# Patient Record
Sex: FEMALE | Race: WHITE | NOT HISPANIC OR LATINO | Employment: OTHER | URBAN - METROPOLITAN AREA
[De-identification: names, ages, dates, MRNs, and addresses within clinical notes are randomized per-mention and may not be internally consistent; named-entity substitution may affect disease eponyms.]

---

## 2019-01-02 ENCOUNTER — OFFICE VISIT (OUTPATIENT)
Dept: OBGYN CLINIC | Facility: MEDICAL CENTER | Age: 69
End: 2019-01-02
Payer: COMMERCIAL

## 2019-01-02 VITALS
WEIGHT: 179 LBS | HEIGHT: 67 IN | DIASTOLIC BLOOD PRESSURE: 80 MMHG | SYSTOLIC BLOOD PRESSURE: 128 MMHG | BODY MASS INDEX: 28.09 KG/M2

## 2019-01-02 DIAGNOSIS — Z12.31 ENCOUNTER FOR SCREENING MAMMOGRAM FOR MALIGNANT NEOPLASM OF BREAST: ICD-10-CM

## 2019-01-02 DIAGNOSIS — Z01.419 ENCOUNTER FOR GYNECOLOGICAL EXAMINATION (GENERAL) (ROUTINE) WITHOUT ABNORMAL FINDINGS: ICD-10-CM

## 2019-01-02 DIAGNOSIS — Z78.0 MENOPAUSE: Primary | ICD-10-CM

## 2019-01-02 PROCEDURE — G0145 SCR C/V CYTO,THINLAYER,RESCR: HCPCS | Performed by: OBSTETRICS & GYNECOLOGY

## 2019-01-02 PROCEDURE — 99397 PER PM REEVAL EST PAT 65+ YR: CPT | Performed by: OBSTETRICS & GYNECOLOGY

## 2019-01-02 RX ORDER — OMEGA-3-ACID ETHYL ESTERS 1 G/1
2 CAPSULE, LIQUID FILLED ORAL 2 TIMES DAILY
COMMUNITY
End: 2020-01-10

## 2019-01-02 RX ORDER — ERGOCALCIFEROL (VITAMIN D2) 10 MCG
800 TABLET ORAL
COMMUNITY
Start: 2016-02-22 | End: 2020-01-10

## 2019-01-02 NOTE — PROGRESS NOTES
Assessment/Plan: This 71-year-old  2 para 2 patient is seen for a yearly gyn evaluation  She had her main complaint is she has a cystocele and uterine prolapse  No problem-specific Assessment & Plan notes found for this encounter  Subjective:      Patient ID: Дмитрий Yu is a 70 y o  female  This 71-year-old patient has had no vaginal bleeding over the past year or episodes of vaginitis  [place] is not occurring at this time  She has been bothered by a progressive a dropping of her bladder and uterus over the years  Her urine function is good she has no urine stress incontinence and does not wear liners or pads every day  She has had no frequent urinary infections over the year  Her bowel function is normal except if she eats wheat  With wheat  she gets diarrhea  She has no chronic headaches or fainting spells  She has no hot flashes  She does have difficulty sleeping at night but does not wish to have any medication for this at this time  Review of Systems   Constitutional: Negative  HENT: Negative  Eyes: Negative  Respiratory: Negative  Cardiovascular: Negative  Gastrointestinal: Negative  Endocrine: Negative  Genitourinary: Negative  Musculoskeletal: Negative  Skin: Negative  Allergic/Immunologic: Negative  Neurological: Negative  Hematological: Negative  Psychiatric/Behavioral: Negative  Objective:      /80 (BP Location: Left arm, Patient Position: Sitting, Cuff Size: Large)   Ht 5' 6 75" (1 695 m)   Wt 81 2 kg (179 lb)   BMI 28 25 kg/m²          Physical Exam   Constitutional: She is oriented to person, place, and time  She appears well-developed and well-nourished  HENT:   Head: Normocephalic  Neck: Normal range of motion  Neck supple  Cardiovascular: Normal rate, regular rhythm, normal heart sounds and intact distal pulses      Pulmonary/Chest: Effort normal and breath sounds normal    Abdominal: Soft  Bowel sounds are normal    Genitourinary: Uterus normal    Musculoskeletal: Normal range of motion  Neurological: She is alert and oriented to person, place, and time  Skin: Skin is warm and dry  Psychiatric: She has a normal mood and affect  Nursing note and vitals reviewed  breast exam is normal  Pelvic exam reveals uterus to be anterior mobile normal size  Uterus prolapses to 1 inch inside the introitus  The bladder pro prolapses to the introitus  There is no blood or discharge in the vagina  The vulva is normal  Rectal exam shows no blood or  masses in the rectum and no nodularity in the cul-de-sac

## 2019-01-02 NOTE — PATIENT INSTRUCTIONS
The patient was told that her breast exam is normal and that a pelvic exam confirms the presence of a cystocele and uterine prolapse  I did advise that she see a urogynecologist for evaluation of her cystocele

## 2019-01-07 LAB
LAB AP GYN PRIMARY INTERPRETATION: NORMAL
Lab: NORMAL

## 2019-04-25 ENCOUNTER — HOSPITAL ENCOUNTER (OUTPATIENT)
Dept: MAMMOGRAPHY | Facility: CLINIC | Age: 69
Discharge: HOME | End: 2019-04-25
Payer: COMMERCIAL

## 2019-04-25 DIAGNOSIS — Z78.0 MENOPAUSE: ICD-10-CM

## 2019-04-25 PROCEDURE — 77080 DXA BONE DENSITY AXIAL: CPT

## 2019-04-26 ENCOUNTER — TELEPHONE (OUTPATIENT)
Dept: OBGYN CLINIC | Facility: CLINIC | Age: 69
End: 2019-04-26

## 2020-01-10 ENCOUNTER — OFFICE VISIT (OUTPATIENT)
Dept: FAMILY MEDICINE CLINIC | Facility: CLINIC | Age: 70
End: 2020-01-10
Payer: COMMERCIAL

## 2020-01-10 VITALS
TEMPERATURE: 97.3 F | WEIGHT: 177.4 LBS | BODY MASS INDEX: 26.89 KG/M2 | SYSTOLIC BLOOD PRESSURE: 102 MMHG | DIASTOLIC BLOOD PRESSURE: 68 MMHG | HEIGHT: 68 IN | OXYGEN SATURATION: 96 % | HEART RATE: 65 BPM

## 2020-01-10 DIAGNOSIS — E66.3 OVERWEIGHT (BMI 25.0-29.9): ICD-10-CM

## 2020-01-10 DIAGNOSIS — H61.23 CERUMEN DEBRIS ON TYMPANIC MEMBRANE OF BOTH EARS: ICD-10-CM

## 2020-01-10 DIAGNOSIS — F51.01 PRIMARY INSOMNIA: ICD-10-CM

## 2020-01-10 DIAGNOSIS — Z00.00 MEDICARE ANNUAL WELLNESS VISIT, INITIAL: ICD-10-CM

## 2020-01-10 DIAGNOSIS — E78.2 MIXED HYPERLIPIDEMIA: ICD-10-CM

## 2020-01-10 DIAGNOSIS — N81.10 FEMALE BLADDER PROLAPSE: Primary | ICD-10-CM

## 2020-01-10 DIAGNOSIS — R53.83 OTHER FATIGUE: ICD-10-CM

## 2020-01-10 DIAGNOSIS — Z76.89 ENCOUNTER TO ESTABLISH CARE: ICD-10-CM

## 2020-01-10 DIAGNOSIS — M85.80 OSTEOPENIA, UNSPECIFIED LOCATION: ICD-10-CM

## 2020-01-10 DIAGNOSIS — Z11.59 NEED FOR HEPATITIS C SCREENING TEST: ICD-10-CM

## 2020-01-10 DIAGNOSIS — Z13.21 ENCOUNTER FOR VITAMIN DEFICIENCY SCREENING: ICD-10-CM

## 2020-01-10 PROCEDURE — 1036F TOBACCO NON-USER: CPT | Performed by: FAMILY MEDICINE

## 2020-01-10 PROCEDURE — G0438 PPPS, INITIAL VISIT: HCPCS | Performed by: FAMILY MEDICINE

## 2020-01-10 PROCEDURE — 3008F BODY MASS INDEX DOCD: CPT | Performed by: FAMILY MEDICINE

## 2020-01-10 PROCEDURE — 1160F RVW MEDS BY RX/DR IN RCRD: CPT | Performed by: FAMILY MEDICINE

## 2020-01-10 PROCEDURE — 99204 OFFICE O/P NEW MOD 45 MIN: CPT | Performed by: FAMILY MEDICINE

## 2020-01-10 PROCEDURE — 1125F AMNT PAIN NOTED PAIN PRSNT: CPT | Performed by: FAMILY MEDICINE

## 2020-01-10 PROCEDURE — 3725F SCREEN DEPRESSION PERFORMED: CPT | Performed by: FAMILY MEDICINE

## 2020-01-10 PROCEDURE — 1170F FXNL STATUS ASSESSED: CPT | Performed by: FAMILY MEDICINE

## 2020-01-10 NOTE — PROGRESS NOTES
Assessment/Plan:       Problem List Items Addressed This Visit        Nervous and Auditory    Cerumen debris on tympanic membrane of both ears     Patient refused ear cleaning in office today  Suggested to try debrox ear drops OTC  Musculoskeletal and Integument    Osteopenia       Genitourinary    Female bladder prolapse - Primary     Will see urogyn next week for possible surgical options  Other    Mixed hyperlipidemia     Will recheck lipid panel  Instructed patient on diet and exercise  Relevant Orders    Comprehensive metabolic panel    Lipid Panel with Direct LDL reflex    Primary insomnia     Suggested trying melatonin or Benedryl to help her sleep  Labs ordered  Overweight (BMI 25 0-29  9)     BMI Counseling: Body mass index is 27 37 kg/m²  The BMI is above normal  Nutrition recommendations include decreasing overall calorie intake  Exercise recommendations include exercising 3-5 times per week  Other Visit Diagnoses     Encounter to establish care        Need for hepatitis C screening test        Relevant Orders    Hepatitis C antibody    Encounter for vitamin deficiency screening        Relevant Orders    Vitamin D 25 hydroxy    Other fatigue        Relevant Orders    CBC and differential    Comprehensive metabolic panel    TSH, 3rd generation with Free T4 reflex    Medicare annual wellness visit, initial                Subjective:      Patient ID: Maya Walls is a 78 y o  female  78year old female presents today to establish care    Bladder/uterine prolapse-She has urinary incontinence/leaking at times  She has been using a pessary since April, but still having some discomfort  Also tried pelvic floor PT  Has an upcoming appointment with urogyn to discuss surgical options  Fatigue-Patient complains of feeling tired in the morning that has been going on for several years   She has difficulty falling asleep at times and usually doesn't sleep more than 6 hours at night  She has some stress due to taking care of her  at home who has Parkinson's  Osteopenia - not taking ca + D  Eats healthy diet  Gardens  DEXA April 2019  Overweight - eats healthy  No formal exercise  Hyperlipidemia-Reviewed labs that were done in April with patient  LDL and cholesterol elevated  Will order lipid panel today to recheck  Complains of some itchiness in ears that started recently  The following portions of the patient's history were reviewed and updated as appropriate:   She  has a past medical history of Bladder prolapse, female, acquired, Hypoglycemia, Lyme disease, Migraine, Uterine prolapse, and Uterine prolapse  She   Patient Active Problem List    Diagnosis Date Noted    Female bladder prolapse 01/10/2020    Mixed hyperlipidemia 01/10/2020    Primary insomnia 01/10/2020    Cerumen debris on tympanic membrane of both ears 01/10/2020    Osteopenia 01/10/2020    Overweight (BMI 25 0-29 9) 01/10/2020     She  has no past surgical history on file  Her family history includes Diabetes in her father and sister; Osteoporosis in her mother; Stroke in her father  She  reports that she has never smoked  She has never used smokeless tobacco  She reports that she does not drink alcohol or use drugs  No current outpatient medications on file  No current facility-administered medications for this visit  Current Outpatient Medications on File Prior to Visit   Medication Sig    [DISCONTINUED] b complex vitamins tablet Take 1 tablet by mouth daily    [DISCONTINUED] omega-3-acid ethyl esters (LOVAZA) 1 g capsule Take 2 g by mouth 2 (two) times a day    [DISCONTINUED] Vitamin D, Cholecalciferol, 400 units TABS Take 800 Units by mouth     No current facility-administered medications on file prior to visit  She is allergic to other and wheat bran       Review of Systems   Constitutional: Positive for fatigue   Negative for activity change, appetite change, chills, fever and unexpected weight change  HENT: Negative for congestion, ear discharge, ear pain, postnasal drip, sinus pressure and sore throat  Ears itch   Eyes: Negative for discharge and visual disturbance  Respiratory: Negative for cough, shortness of breath and wheezing  Cardiovascular: Negative for chest pain, palpitations and leg swelling  Gastrointestinal: Negative for abdominal pain, constipation, diarrhea, nausea and vomiting  Endocrine: Negative for cold intolerance, heat intolerance, polydipsia and polyuria  Genitourinary: Negative for difficulty urinating and frequency  Urinary incontinence   Musculoskeletal: Negative for arthralgias, back pain, joint swelling and myalgias  Skin: Negative for rash  Neurological: Negative for dizziness, weakness, light-headedness, numbness and headaches  Hematological: Negative for adenopathy  Psychiatric/Behavioral: Positive for sleep disturbance  Negative for behavioral problems, confusion, dysphoric mood and suicidal ideas  The patient is not nervous/anxious  Objective:      /68   Pulse 65   Temp (!) 97 3 °F (36 3 °C)   Ht 5' 7 5" (1 715 m)   Wt 80 5 kg (177 lb 6 4 oz)   SpO2 96%   BMI 27 37 kg/m²          Physical Exam   Constitutional: She is oriented to person, place, and time  She appears well-developed and well-nourished  No distress  HENT:   Head: Normocephalic and atraumatic  Right Ear: Hearing, tympanic membrane, external ear and ear canal normal    Left Ear: Hearing, tympanic membrane, external ear and ear canal normal    Nose: Nose normal    Mouth/Throat: Oropharynx is clear and moist and mucous membranes are normal  No oropharyngeal exudate  Cerumen noted to bilateral ears, more on R than L   Eyes: Conjunctivae are normal    Neck: Normal range of motion  Neck supple  No thyromegaly present  Cardiovascular: Normal rate, regular rhythm and normal heart sounds   Exam reveals no gallop and no friction rub  No murmur heard  Pulmonary/Chest: Effort normal and breath sounds normal  No respiratory distress  She has no wheezes  She has no rales  She exhibits no tenderness  Abdominal: Soft  Bowel sounds are normal  She exhibits no distension and no mass  There is no tenderness  There is no rebound and no guarding  Musculoskeletal: Normal range of motion  She exhibits no edema  Lymphadenopathy:     She has no cervical adenopathy  Neurological: She is alert and oriented to person, place, and time  Skin: Skin is warm and dry  No rash noted  She is not diaphoretic  Psychiatric: She has a normal mood and affect  Her behavior is normal  Judgment and thought content normal    Nursing note and vitals reviewed

## 2020-01-10 NOTE — PATIENT INSTRUCTIONS
Medicare Preventive Visit Patient Instructions  Thank you for completing your Welcome to Medicare Visit or Medicare Annual Wellness Visit today  Your next wellness visit will be due in one year (1/10/2021)  The screening/preventive services that you may require over the next 5-10 years are detailed below  Some tests may not apply to you based off risk factors and/or age  Screening tests ordered at today's visit but not completed yet may show as past due  Also, please note that scanned in results may not display below  Preventive Screenings:  Service Recommendations Previous Testing/Comments   Colorectal Cancer Screening  * Colonoscopy    * Fecal Occult Blood Test (FOBT)/Fecal Immunochemical Test (FIT)  * Fecal DNA/Cologuard Test  * Flexible Sigmoidoscopy Age: 54-65 years old   Colonoscopy: every 10 years (may be performed more frequently if at higher risk)  OR  FOBT/FIT: every 1 year  OR  Cologuard: every 3 years  OR  Sigmoidoscopy: every 5 years  Screening may be recommended earlier than age 48 if at higher risk for colorectal cancer  Also, an individualized decision between you and your healthcare provider will decide whether screening between the ages of 74-80 would be appropriate  Colonoscopy: Not on file  FOBT/FIT: Not on file  Cologuard: Not on file  Sigmoidoscopy: Not on file    Screening Current     Breast Cancer Screening Age: 36 years old  Frequency: every 1-2 years  Not required if history of left and right mastectomy Mammogram: 02/15/2016       Cervical Cancer Screening Between the ages of 21-29, pap smear recommended once every 3 years  Between the ages of 33-67, can perform pap smear with HPV co-testing every 5 years     Recommendations may differ for women with a history of total hysterectomy, cervical cancer, or abnormal pap smears in past  Pap Smear: 01/02/2019    Screening Not Indicated   Hepatitis C Screening Once for adults born between 1945 and 1965  More frequently in patients at high risk for Hepatitis C Hep C Antibody: Not on file       Diabetes Screening 1-2 times per year if you're at risk for diabetes or have pre-diabetes Fasting glucose: No results in last 5 years   A1C: No results in last 5 years       Cholesterol Screening Once every 5 years if you don't have a lipid disorder  May order more often based on risk factors  Lipid panel: Not on file         Other Preventive Screenings Covered by Medicare:  1  Abdominal Aortic Aneurysm (AAA) Screening: covered once if your at risk  You're considered to be at risk if you have a family history of AAA  2  Lung Cancer Screening: covers low dose CT scan once per year if you meet all of the following conditions: (1) Age 50-69; (2) No signs or symptoms of lung cancer; (3) Current smoker or have quit smoking within the last 15 years; (4) You have a tobacco smoking history of at least 30 pack years (packs per day multiplied by number of years you smoked); (5) You get a written order from a healthcare provider  3  Glaucoma Screening: covered annually if you're considered high risk: (1) You have diabetes OR (2) Family history of glaucoma OR (3)  aged 48 and older OR (3)  American aged 72 and older  3  Osteoporosis Screening: covered every 2 years if you meet one of the following conditions: (1) You're estrogen deficient and at risk for osteoporosis based off medical history and other findings; (2) Have a vertebral abnormality; (3) On glucocorticoid therapy for more than 3 months; (4) Have primary hyperparathyroidism; (5) On osteoporosis medications and need to assess response to drug therapy  · Last bone density test (DXA Scan): 04/25/2019   5  HIV Screening: covered annually if you're between the age of 15-65  Also covered annually if you are younger than 13 and older than 72 with risk factors for HIV infection  For pregnant patients, it is covered up to 3 times per pregnancy      Immunizations:  Immunization Recommendations Influenza Vaccine Annual influenza vaccination during flu season is recommended for all persons aged >= 6 months who do not have contraindications   Pneumococcal Vaccine (Prevnar and Pneumovax)  * Prevnar = PCV13  * Pneumovax = PPSV23   Adults 25-60 years old: 1-3 doses may be recommended based on certain risk factors  Adults 72 years old: Prevnar (PCV13) vaccine recommended followed by Pneumovax (PPSV23) vaccine  If already received PPSV23 since turning 65, then PCV13 recommended at least one year after PPSV23 dose  Hepatitis B Vaccine 3 dose series if at intermediate or high risk (ex: diabetes, end stage renal disease, liver disease)   Tetanus (Td) Vaccine - COST NOT COVERED BY MEDICARE PART B Following completion of primary series, a booster dose should be given every 10 years to maintain immunity against tetanus  Td may also be given as tetanus wound prophylaxis  Tdap Vaccine - COST NOT COVERED BY MEDICARE PART B Recommended at least once for all adults  For pregnant patients, recommended with each pregnancy  Shingles Vaccine (Shingrix) - COST NOT COVERED BY MEDICARE PART B  2 shot series recommended in those aged 48 and above     Health Maintenance Due:      Topic Date Due    Hepatitis C Screening  1950    MAMMOGRAM  01/10/2021 (Originally 2/15/2017)    CRC Screening: Colonoscopy  01/10/2021 (Originally 1950)     Immunizations Due:  There are no preventive care reminders to display for this patient  Advance Directives   What are advance directives? Advance directives are legal documents that state your wishes and plans for medical care  These plans are made ahead of time in case you lose your ability to make decisions for yourself  Advance directives can apply to any medical decision, such as the treatments you want, and if you want to donate organs  What are the types of advance directives? There are many types of advance directives, and each state has rules about how to use them  You may choose a combination of any of the following:  · Living will: This is a written record of the treatment you want  You can also choose which treatments you do not want, which to limit, and which to stop at a certain time  This includes surgery, medicine, IV fluid, and tube feedings  · Durable power of  for healthcare [facility]): This is a written record that states who you want to make healthcare choices for you when you are unable to make them for yourself  This person, called a proxy, is usually a family member or a friend  You may choose more than 1 proxy  · Do not resuscitate (DNR) order:  A DNR order is used in case your heart stops beating or you stop breathing  It is a request not to have certain forms of treatment, such as CPR  A DNR order may be included in other types of advance directives  · Medical directive: This covers the care that you want if you are in a coma, near death, or unable to make decisions for yourself  You can list the treatments you want for each condition  Treatment may include pain medicine, surgery, blood transfusions, dialysis, IV or tube feedings, and a ventilator (breathing machine)  · Values history: This document has questions about your views, beliefs, and how you feel and think about life  This information can help others choose the care that you would choose  Why are advance directives important? An advance directive helps you control your care  Although spoken wishes may be used, it is better to have your wishes written down  Spoken wishes can be misunderstood, or not followed  Treatments may be given even if you do not want them  An advance directive may make it easier for your family to make difficult choices about your care  Fall Prevention    Fall prevention  includes ways to make your home and other areas safer  It also includes ways you can move more carefully to prevent a fall   Health conditions that cause changes in your blood pressure, vision, or muscle strength and coordination may increase your risk for falls  Medicines may also increase your risk for falls if they make you dizzy, weak, or sleepy  Fall prevention tips:   · Stand or sit up slowly  · Use assistive devices as directed  · Wear shoes that fit well and have soles that   · Wear a personal alarm  · Stay active  · Manage your medical conditions  Home Safety Tips:  · Add items to prevent falls in the bathroom  · Keep paths clear  · Install bright lights in your home  · Keep items you use often on shelves within reach  · Paint or place reflective tape on the edges of your stairs  Urinary Incontinence   Urinary incontinence (UI)  is when you lose control of your bladder  UI develops because your bladder cannot store or empty urine properly  The 3 most common types of UI are stress incontinence, urge incontinence, or both  Medicines:   · May be given to help strengthen your bladder control  Report any side effects of medication to your healthcare provider  Do pelvic muscle exercises often:  Your pelvic muscles help you stop urinating  Squeeze these muscles tight for 5 seconds, then relax for 5 seconds  Gradually work up to squeezing for 10 seconds  Do 3 sets of 15 repetitions a day, or as directed  This will help strengthen your pelvic muscles and improve bladder control  Train your bladder:  Go to the bathroom at set times, such as every 2 hours, even if you do not feel the urge to go  You can also try to hold your urine when you feel the urge to go  For example, hold your urine for 5 minutes when you feel the urge to go  As that becomes easier, hold your urine for 10 minutes  Self-care:   · Keep a UI record  Write down how often you leak urine and how much you leak  Make a note of what you were doing when you leaked urine  · Drink liquids as directed  You may need to limit the amount of liquid you drink to help control your urine leakage   Do not drink any liquid right before you go to bed  Limit or do not have drinks that contain caffeine or alcohol  · Prevent constipation  Eat a variety of high-fiber foods  Good examples are high-fiber cereals, beans, vegetables, and whole-grain breads  Walking is the best way to trigger your intestines to have a bowel movement  · Exercise regularly and maintain a healthy weight  Weight loss and exercise will decrease pressure on your bladder and help you control your leakage  · Use a catheter as directed  to help empty your bladder  A catheter is a tiny, plastic tube that is put into your bladder to drain your urine  · Go to behavior therapy as directed  Behavior therapy may be used to help you learn to control your urge to urinate  Weight Management   Why it is important to manage your weight:  Being overweight increases your risk of health conditions such as heart disease, high blood pressure, type 2 diabetes, and certain types of cancer  It can also increase your risk for osteoarthritis, sleep apnea, and other respiratory problems  Aim for a slow, steady weight loss  Even a small amount of weight loss can lower your risk of health problems  How to lose weight safely:  A safe and healthy way to lose weight is to eat fewer calories and get regular exercise  You can lose up about 1 pound a week by decreasing the number of calories you eat by 500 calories each day  Healthy meal plan for weight management:  A healthy meal plan includes a variety of foods, contains fewer calories, and helps you stay healthy  A healthy meal plan includes the following:  · Eat whole-grain foods more often  A healthy meal plan should contain fiber  Fiber is the part of grains, fruits, and vegetables that is not broken down by your body  Whole-grain foods are healthy and provide extra fiber in your diet  Some examples of whole-grain foods are whole-wheat breads and pastas, oatmeal, brown rice, and bulgur    · Eat a variety of vegetables every day  Include dark, leafy greens such as spinach, kale, vanessa greens, and mustard greens  Eat yellow and orange vegetables such as carrots, sweet potatoes, and winter squash  · Eat a variety of fruits every day  Choose fresh or canned fruit (canned in its own juice or light syrup) instead of juice  Fruit juice has very little or no fiber  · Eat low-fat dairy foods  Drink fat-free (skim) milk or 1% milk  Eat fat-free yogurt and low-fat cottage cheese  Try low-fat cheeses such as mozzarella and other reduced-fat cheeses  · Choose meat and other protein foods that are low in fat  Choose beans or other legumes such as split peas or lentils  Choose fish, skinless poultry (chicken or turkey), or lean cuts of red meat (beef or pork)  Before you cook meat or poultry, cut off any visible fat  · Use less fat and oil  Try baking foods instead of frying them  Add less fat, such as margarine, sour cream, regular salad dressing and mayonnaise to foods  Eat fewer high-fat foods  Some examples of high-fat foods include french fries, doughnuts, ice cream, and cakes  · Eat fewer sweets  Limit foods and drinks that are high in sugar  This includes candy, cookies, regular soda, and sweetened drinks  Exercise:  Exercise at least 30 minutes per day on most days of the week  Some examples of exercise include walking, biking, dancing, and swimming  You can also fit in more physical activity by taking the stairs instead of the elevator or parking farther away from stores  Ask your healthcare provider about the best exercise plan for you  © Copyright [employer] 2018 Information is for End User's use only and may not be sold, redistributed or otherwise used for commercial purposes   All illustrations and images included in CareNotes® are the copyrighted property of A [employer] M , [employer]  or [address] [place] [address] [facility]

## 2020-01-10 NOTE — PROGRESS NOTES
BMI Counseling: Body mass index is 27 37 kg/m²  The BMI is above normal  Nutrition recommendations include reducing portion sizes, decreasing overall calorie intake and 3-5 servings of fruits/vegetables daily  Exercise recommendations include exercising 3-5 times per week  Assessment and Plan:     Problem List Items Addressed This Visit        Nervous and Auditory    Cerumen debris on tympanic membrane of both ears     Patient refused ear cleaning in office today  Suggested to try debrox ear drops OTC  Musculoskeletal and Integument    Osteopenia       Genitourinary    Female bladder prolapse - Primary     Will see urogyn next week for possible surgical options  Other    Mixed hyperlipidemia     Will recheck lipid panel  Instructed patient on diet and exercise  Relevant Orders    Comprehensive metabolic panel    Lipid Panel with Direct LDL reflex    Primary insomnia     Suggested trying melatonin or Benedryl to help her sleep  Labs ordered  Overweight (BMI 25 0-29  9)     BMI Counseling: Body mass index is 27 37 kg/m²  The BMI is above normal  Nutrition recommendations include decreasing overall calorie intake  Exercise recommendations include exercising 3-5 times per week  Other Visit Diagnoses     Encounter to establish care        Need for hepatitis C screening test        Relevant Orders    Hepatitis C antibody    Encounter for vitamin deficiency screening        Relevant Orders    Vitamin D 25 hydroxy    Other fatigue        Relevant Orders    CBC and differential    Comprehensive metabolic panel    TSH, 3rd generation with Free T4 reflex    Medicare annual wellness visit, initial               Preventive health issues were discussed with patient, and age appropriate screening tests were ordered as noted in patient's After Visit Summary    Personalized health advice and appropriate referrals for health education or preventive services given if needed, as noted in patient's After Visit Summary  History of Present Illness:     Patient presents for Medicare Annual Wellness visit    Patient Care Team:  Howard Daniel MD as PCP - General (Family Medicine)     Problem List:     Patient Active Problem List   Diagnosis    Female bladder prolapse    Mixed hyperlipidemia    Primary insomnia    Cerumen debris on tympanic membrane of both ears    Osteopenia    Overweight (BMI 25 0-29  9)      Past Medical and Surgical History:     Past Medical History:   Diagnosis Date    Bladder prolapse, female, acquired     Hypoglycemia     Lyme disease     3 years ago    Migraine     Uterine prolapse     Uterine prolapse      History reviewed  No pertinent surgical history     Family History:     Family History   Problem Relation Age of Onset    Osteoporosis Mother     Stroke Father     Diabetes Father     Diabetes Sister       Social History:     Social History     Socioeconomic History    Marital status: /Civil Union     Spouse name: None    Number of children: None    Years of education: None    Highest education level: None   Occupational History    Occupation:      Comment: retired   Social Needs    Financial resource strain: None    Food insecurity:     Worry: None     Inability: None    Transportation needs:     Medical: None     Non-medical: None   Tobacco Use    Smoking status: Never Smoker    Smokeless tobacco: Never Used   Substance and Sexual Activity    Alcohol use: No    Drug use: No    Sexual activity: Never   Lifestyle    Physical activity:     Days per week: None     Minutes per session: None    Stress: None   Relationships    Social connections:     Talks on phone: None     Gets together: None     Attends Mandaeism service: None     Active member of club or organization: None     Attends meetings of clubs or organizations: None     Relationship status: None    Intimate partner violence:     Fear of current or ex partner: None     Emotionally abused: None     Physically abused: None     Forced sexual activity: None   Other Topics Concern    None   Social History Narrative    None       Medications and Allergies:     No current outpatient medications on file  No current facility-administered medications for this visit  Allergies   Allergen Reactions    Other Nausea Only and Headache     Chemicals and perfumes    Wheat Bran Diarrhea      Immunizations:     Immunization History   Administered Date(s) Administered    Tdap 02/05/2016, 11/26/2019      Health Maintenance:         Topic Date Due    Hepatitis C Screening  1950    MAMMOGRAM  01/10/2021 (Originally 2/15/2017)    CRC Screening: Colonoscopy  01/10/2021 (Originally 1950)     There are no preventive care reminders to display for this patient  Medicare Health Risk Assessment:     /68   Pulse 65   Temp (!) 97 3 °F (36 3 °C)   Ht 5' 7 5" (1 715 m)   Wt 80 5 kg (177 lb 6 4 oz)   SpO2 96%   BMI 27 37 kg/m²      Audrey Young is here for her Initial Wellness visit  Health Risk Assessment:   Patient rates overall health as good  Patient feels that their physical health rating is slightly worse  Eyesight was rated as same  Hearing was rated as same  Patient feels that their emotional and mental health rating is same  Pain experienced in the last 7 days has been none  Patient states that she has experienced no weight loss or gain in last 6 months  Depression Screening:   PHQ-2 Score: 2      Fall Risk Screening: In the past year, patient has experienced: history of falling in past year    Number of falls: 1  Injured during fall?: Yes    Feels unsteady when standing or walking?: No    Worried about falling?: No      Urinary Incontinence Screening:   Patient has leaked urine accidently in the last six months  Home Safety:  Patient does not have trouble with stairs inside or outside of their home   Patient has working smoke alarms and has working carbon monoxide detector  Home safety hazards include: none  Nutrition:   Current diet is Regular and No Added Salt  Medications:   Patient is not currently taking any over-the-counter supplements  Patient is able to manage medications  Activities of Daily Living (ADLs)/Instrumental Activities of Daily Living (IADLs):   Walk and transfer into and out of bed and chair?: Yes  Dress and groom yourself?: Yes    Bathe or shower yourself?: Yes    Feed yourself? Yes  Do your laundry/housekeeping?: Yes  Manage your money, pay your bills and track your expenses?: Yes  Make your own meals?: Yes    Do your own shopping?: Yes    Previous Hospitalizations:   Any hospitalizations or ED visits within the last 12 months?: No      Advance Care Planning:   Living will: Yes    Durable POA for healthcare: Yes    Advanced directive: Yes      Cognitive Screening:   Provider or family/friend/caregiver concerned regarding cognition?: No    PREVENTIVE SCREENINGS      Cardiovascular Screening:    General: Risks and Benefits Discussed    Due for: Lipid Panel, Cholesterol and Triglycerides      Diabetes Screening:     General: Risks and Benefits Discussed    Due for: Blood Glucose      Colorectal Cancer Screening:     General: Screening Current      Breast Cancer Screening:     General: Risks and Benefits Discussed and Patient Declines      Cervical Cancer Screening:    General: Screening Not Indicated      Osteoporosis Screening:    General: Screening Current      Abdominal Aortic Aneurysm (AAA) Screening:        General: Screening Not Indicated      Lung Cancer Screening:     General: Screening Not Indicated      Hepatitis C Screening:    General: Risks and Benefits Discussed    Hep C Screening Accepted: Yes      Other Counseling Topics:   Car/seat belt/driving safety, sunscreen and regular weightbearing exercise and calcium and vitamin D intake         Lynsey Benz MD

## 2020-01-10 NOTE — ASSESSMENT & PLAN NOTE
BMI Counseling: Body mass index is 27 37 kg/m²  The BMI is above normal  Nutrition recommendations include decreasing overall calorie intake  Exercise recommendations include exercising 3-5 times per week

## 2020-02-26 RX ORDER — MELATONIN
1000 DAILY
COMMUNITY

## 2020-02-26 NOTE — PRE-PROCEDURE INSTRUCTIONS
Pre-Surgery Instructions:   Medication Instructions    b complex vitamins tablet Instructed patient per Anesthesia Guidelines   Chlorphen-Phenyleph-Methscop (DEHISTINE PO) Instructed patient per Anesthesia Guidelines   cholecalciferol (VITAMIN D3) 1,000 units tablet Instructed patient per Anesthesia Guidelines   Estrogens, Conjugated (PREMARIN VA) Instructed patient per Anesthesia Guidelines   Omega-3 Fatty Acids (OMEGA-3 FISH OIL PO) Instructed patient per Anesthesia Guidelines    Patient given/ instructed on use of chlorhexidine soap per hospital protocol    Patient instructed to stop all ASA, NSAIDS, vitamins and herbal supplements one week prior to surgery or per Dr Bronson Rose

## 2020-03-06 ENCOUNTER — ANESTHESIA EVENT (OUTPATIENT)
Dept: PERIOP | Facility: HOSPITAL | Age: 70
End: 2020-03-06
Payer: COMMERCIAL

## 2020-03-09 ENCOUNTER — ANESTHESIA (OUTPATIENT)
Dept: PERIOP | Facility: HOSPITAL | Age: 70
End: 2020-03-09
Payer: COMMERCIAL

## 2020-03-09 ENCOUNTER — HOSPITAL ENCOUNTER (OUTPATIENT)
Facility: HOSPITAL | Age: 70
Setting detail: OUTPATIENT SURGERY
Discharge: HOME | End: 2020-03-09
Attending: OBSTETRICS & GYNECOLOGY | Admitting: OBSTETRICS & GYNECOLOGY
Payer: COMMERCIAL

## 2020-03-09 VITALS
SYSTOLIC BLOOD PRESSURE: 111 MMHG | BODY MASS INDEX: 25.76 KG/M2 | HEART RATE: 61 BPM | OXYGEN SATURATION: 94 % | WEIGHT: 170 LBS | RESPIRATION RATE: 16 BRPM | TEMPERATURE: 97.5 F | DIASTOLIC BLOOD PRESSURE: 56 MMHG | HEIGHT: 68 IN

## 2020-03-09 DIAGNOSIS — N81.10 FEMALE BLADDER PROLAPSE: Primary | ICD-10-CM

## 2020-03-09 PROCEDURE — 51798 US URINE CAPACITY MEASURE: CPT | Performed by: OBSTETRICS & GYNECOLOGY

## 2020-03-09 PROCEDURE — C1771 REP DEV, URINARY, W/SLING: HCPCS | Performed by: OBSTETRICS & GYNECOLOGY

## 2020-03-09 PROCEDURE — 82948 REAGENT STRIP/BLOOD GLUCOSE: CPT

## 2020-03-09 DEVICE — SINGLE INCISION SLING SYSTEM
Type: IMPLANTABLE DEVICE | Site: VAGINA | Status: FUNCTIONAL
Brand: ALTIS

## 2020-03-09 DEVICE — THE NEUGUIDE™ IS A SINGLE USE TRANS-VAGINAL DEVICE FOR THE REPAIR OF PELVIC ORGAN PROLAPSE (POP) BY ANCHORING SUTURES TO LIGAMENTS OF THE PELVIC FLOOR.
Type: IMPLANTABLE DEVICE | Site: VAGINA | Status: FUNCTIONAL
Brand: NEUGUIDE™

## 2020-03-09 RX ORDER — SODIUM CHLORIDE 9 MG/ML
INJECTION INTRAVENOUS AS NEEDED
Status: DISCONTINUED | OUTPATIENT
Start: 2020-03-09 | End: 2020-03-09 | Stop reason: HOSPADM

## 2020-03-09 RX ORDER — ONDANSETRON 2 MG/ML
4 INJECTION INTRAMUSCULAR; INTRAVENOUS EVERY 6 HOURS PRN
Status: DISCONTINUED | OUTPATIENT
Start: 2020-03-09 | End: 2020-03-09 | Stop reason: HOSPADM

## 2020-03-09 RX ORDER — FENTANYL CITRATE/PF 50 MCG/ML
25 SYRINGE (ML) INJECTION
Status: DISCONTINUED | OUTPATIENT
Start: 2020-03-09 | End: 2020-03-09 | Stop reason: HOSPADM

## 2020-03-09 RX ORDER — HYDROMORPHONE HCL/PF 1 MG/ML
0.5 SYRINGE (ML) INJECTION
Status: DISCONTINUED | OUTPATIENT
Start: 2020-03-09 | End: 2020-03-09 | Stop reason: HOSPADM

## 2020-03-09 RX ORDER — MEPERIDINE HYDROCHLORIDE 50 MG/ML
12.5 INJECTION INTRAMUSCULAR; INTRAVENOUS; SUBCUTANEOUS AS NEEDED
Status: DISCONTINUED | OUTPATIENT
Start: 2020-03-09 | End: 2020-03-09 | Stop reason: HOSPADM

## 2020-03-09 RX ORDER — IBUPROFEN 600 MG/1
600 TABLET ORAL EVERY 6 HOURS PRN
Qty: 30 TABLET | Refills: 0
Start: 2020-03-09

## 2020-03-09 RX ORDER — MAGNESIUM HYDROXIDE 1200 MG/15ML
LIQUID ORAL AS NEEDED
Status: DISCONTINUED | OUTPATIENT
Start: 2020-03-09 | End: 2020-03-09 | Stop reason: HOSPADM

## 2020-03-09 RX ORDER — ACETAMINOPHEN 500 MG
500 TABLET ORAL EVERY 6 HOURS PRN
Qty: 30 TABLET | Refills: 0
Start: 2020-03-09

## 2020-03-09 RX ORDER — SODIUM CHLORIDE 9 MG/ML
125 INJECTION, SOLUTION INTRAVENOUS CONTINUOUS
Status: DISCONTINUED | OUTPATIENT
Start: 2020-03-09 | End: 2020-03-09 | Stop reason: HOSPADM

## 2020-03-09 RX ORDER — ACETAMINOPHEN 325 MG/1
650 TABLET ORAL EVERY 6 HOURS PRN
Status: DISCONTINUED | OUTPATIENT
Start: 2020-03-09 | End: 2020-03-09 | Stop reason: HOSPADM

## 2020-03-09 RX ORDER — ALBUTEROL SULFATE 2.5 MG/3ML
2.5 SOLUTION RESPIRATORY (INHALATION) ONCE AS NEEDED
Status: DISCONTINUED | OUTPATIENT
Start: 2020-03-09 | End: 2020-03-09 | Stop reason: HOSPADM

## 2020-03-09 RX ADMIN — FENTANYL CITRATE 25 MCG: 50 INJECTION INTRAMUSCULAR; INTRAVENOUS at 12:14

## 2020-03-09 RX ADMIN — SODIUM CHLORIDE 125 ML/HR: 0.9 INJECTION, SOLUTION INTRAVENOUS at 07:49

## 2020-03-09 RX ADMIN — ACETAMINOPHEN 650 MG: 325 TABLET ORAL at 15:09

## 2020-03-09 NOTE — ANESTHESIA PREPROCEDURE EVALUATION
Review of Systems/Medical History  Patient summary reviewed  Chart reviewed  No history of anesthetic complications     Cardiovascular  Hyperlipidemia,    Pulmonary  Negative pulmonary ROS        GI/Hepatic  Negative GI/hepatic ROS          Negative  ROS        Endo/Other  Negative endo/other ROS   Comment: Lyme    GYN  Negative gynecology ROS          Hematology  Negative hematology ROS      Musculoskeletal    Arthritis     Neurology    Headaches,    Psychology   Negative psychology ROS              Physical Exam    Airway    Mallampati score: II  TM Distance: >3 FB  Neck ROM: full     Dental   Comment: bridge, lower dentures,     Cardiovascular  Rhythm: regular, Rate: normal, Cardiovascular exam normal    Pulmonary  Breath sounds clear to auscultation,     Other Findings        Anesthesia Plan  ASA Score- 2     Anesthesia Type- IV sedation with anesthesia and general with ASA Monitors  Additional Monitors:   Airway Plan:         Plan Factors-Patient not instructed to abstain from smoking on day of procedure  Patient did not smoke on day of surgery  Induction- intravenous  Postoperative Plan-     Informed Consent- Anesthetic plan and risks discussed with patient and spouse

## 2020-03-09 NOTE — INTERVAL H&P NOTE
H&P reviewed  After examining the patient I find no changes in the patients condition since the H&P had been written      Vitals:    03/09/20 0733   BP: 117/66   Pulse: 87   Resp: 16   Temp: (!) 96 9 °F (36 1 °C)   SpO2: 97%

## 2020-03-09 NOTE — DISCHARGE INSTRUCTIONS
Please keep vaginal packing in for 3 days (until 3/12)  You may remove the entire packing in the morning  Anterior Vaginal Repair   WHAT YOU NEED TO KNOW:   An anterior vaginal repair is a procedure to lift or tighten the front vaginal wall  This can help prevent you from leaking urine  The procedure is also called an anterior colporrhaphy  DISCHARGE INSTRUCTIONS:   Medicines:   · Pain medicine: You may need medicine to take away or decrease pain  ¨ Learn how to take your medicine  Ask what medicine and how much you should take  Be sure you know how, when, and how often to take it  ¨ Do not wait until the pain is severe before you take your medicine  Tell caregivers if your pain does not decrease  ¨ Pain medicine can make you dizzy or sleepy  Prevent falls by calling someone when you get out of bed or if you need help  · Antibiotics: This medicine is given to fight or prevent an infection caused by bacteria  Always take your antibiotics exactly as ordered by your healthcare provider  Do not stop taking your medicine unless directed by your healthcare provider  Never save antibiotics or take leftover antibiotics that were given to you for another illness  · Take your medicine as directed  Contact your healthcare provider if you think your medicine is not helping or if you have side effects  Tell him or her if you are allergic to any medicine  Keep a list of the medicines, vitamins, and herbs you take  Include the amounts, and when and why you take them  Bring the list or the pill bottles to follow-up visits  Carry your medicine list with you in case of an emergency  Follow up with your healthcare provider as directed:  Write down your questions so you remember to ask them during your visits  Self-care:   · Sex:  Do not have sex until your healthcare provider says it is okay  · Kegel exercises: To do kegel exercises, squeeze your pelvic floor muscles for 5 to 10 seconds, then release  Regular kegel exercises will help your pelvic floor muscles become stronger  This will help prevent you from leaking urine  Ask your healthcare provider when to start these exercises and how often to do them  · Sanitary pad:  Change your sanitary pad regularly  Keep track of how often you change the pad  · Abernathy catheter:  Keep the bag below your waist  This will help prevent infection and other problems caused by urine flowing back into your bladder  Do not pull on the catheter because this can cause pain and bleeding, and the catheter could come out  Keep the catheter tubing free of kinks so your urine will flow into the bag  Your healthcare provider will remove the catheter as soon as possible, to help prevent infection  · Wound care:  When you are allowed to bathe or shower, carefully wash your vaginal area with soap and water  · Do not put pressure on your abdomen: This will help prevent damage to your surgery area  Do not strain, lift heavy objects, or stand for a very long time  Do not perform strenuous exercises, such as running and weight lifting  · Activity:  You may need to start walking within a few days after your procedure  Ask your healthcare provider when to start and how long you should walk  Ask about any other exercises that may be right for you  · Support socks: You may need to wear support socks  These are tight socks that help increase the circulation in your legs until you are more active  This helps prevent blood clots  Contact your healthcare provider if:   · You soak a sanitary pad with blood every hour for 4 hours  · You have vaginal pain that does not go away even after you take pain medicine  · You have pus or a foul-smelling discharge from your genital area  · You see blood in your urine  · You have pain during sex  · You have a fever, chills, a cough, or feel weak and achy  · You have nausea and vomiting      · You have questions or concerns about your condition or care  Seek care immediately or call 911 if:   · You feel something is bulging out into your vagina or rectum and not going back in     · You cannot urinate  · Your arm or leg feels warm, tender, and painful  It may look swollen and red  · You suddenly feel lightheaded and short of breath  · You have chest pain  You may have more pain when you take a deep breath or cough  You may cough up blood  © 2017 [employer]  Information is for End User's use only and may not be sold, redistributed or otherwise used for commercial purposes  All illustrations and images included in CareNotes® are the copyrighted property of [employer]  or [employer]  The above information is an  only  It is not intended as medical advice for individual conditions or treatments  Talk to your doctor, nurse or pharmacist before following any medical regimen to see if it is safe and effective for you  Posterior Vaginal Repair   WHAT YOU NEED TO KNOW:   A posterior vaginal repair is surgery to fix a rectocele or vaginal hernia  DISCHARGE INSTRUCTIONS:   Medicines:   · Pain medicine  will help take away or decrease pain  Do not wait until the pain is severe before you take your medicine  · NSAIDs , such as ibuprofen, help decrease swelling, pain, and fever  This medicine is available with or without a doctor's order  NSAIDs can cause stomach bleeding or kidney problems in certain people  If you take blood thinner medicine, always ask your healthcare provider if NSAIDs are safe for you  Always read the medicine label and follow directions  · Bowel movement softeners  make it easier for you to have a bowel movement  You may need this medicine to treat or prevent constipation  · Take your medicine as directed  Contact your healthcare provider if you think your medicine is not helping or if you have side effects   Tell him or her if you are allergic to any medicine  Keep a list of the medicines, vitamins, and herbs you take  Include the amounts, and when and why you take them  Bring the list or the pill bottles to follow-up visits  Carry your medicine list with you in case of an emergency  Follow up with your gynecologist in 2 weeks: You will need to return to have your incision checked  Write down your questions so you remember to ask them during your visits  Self-care:   · Do not have sex  until your healthcare provider says it is okay  · Do not put anything in your vagina  for 6 weeks after the surgery  This allows time for the wound to heal      · Do not lift more than 10 pounds  for at least 6 weeks  Heavy lifting puts pressure on the surgery area and slows healing  · Avoid heavy exercise  the first few weeks after the surgery  You may try light activity, such as short walks, 3 to 4 weeks after the surgery  · Try not to cough or strain to have a bowel movement  This may cause damage to the surgery area  Ask your healthcare provider about ways to make bowel movements easier so you do not have to strain  · Eat healthy foods and drink liquids as directed  This will help prevent constipation  Healthy foods include fruits, vegetables, whole-grain breads, low-fat dairy products, beans, lean meats, and fish  Contact your healthcare provider or gynecologist if:   · You have vaginal pain that does not go away, even after you take pain medicine  · You have pus or a foul-smelling discharge from your vagina  · You have pain during sex  · You have a fever or chills  · Your wound is red, swollen, or draining pus  · You have questions or concerns about your condition or care  Seek care immediately or call 911 if:   · You soak a sanitary pad with blood every hour for 4 hours  · You feel something is bulging out into your vagina or rectum and not going back in     · You cannot urinate    © 2017 [employer] Information is for End User's use only and may not be sold, redistributed or otherwise used for commercial purposes  All illustrations and images included in CareNotes® are the copyrighted property of [employer]  or [employer]  The above information is an  only  It is not intended as medical advice for individual conditions or treatments  Talk to your doctor, nurse or pharmacist before following any medical regimen to see if it is safe and effective for you  Abernathy Catheter Placement and Care   WHAT YOU NEED TO KNOW:   A Abernathy catheter is a sterile tube that is inserted into your bladder to drain urine  It is also called an indwelling urinary catheter  The tip of the catheter has a small balloon filled with solution that holds the catheter inside your bladder  DISCHARGE INSTRUCTIONS:   Seek care immediately if:   · Your catheter comes out  · You suddenly have material that looks like sand in the tubing or drainage bag  · No urine is draining into the bag and you have checked the system  · You have pain in your hip, back, pelvis, or lower abdomen  · You are confused or cannot think clearly  Contact your healthcare provider if:   · You have a fever  · You have bladder spasms for more than 1 day after the catheter is placed  · You see blood in the tubing or drainage bag  · You have a rash or itching where the catheter tube is secured to your skin  · Urine leaks from or around the catheter, tubing, or drainage bag  · The closed drainage system has accidently come open or apart  · You see a layer of crystals inside the tubing  · You have questions or concerns about your condition or care  Care for your Abernathy catheter:   · Clean your genital area 2 times every day  Clean your catheter and the area around where it was inserted  Use soap and water  Clean your anal opening and catheter area after every bowel movement       · Secure the catheter tube  so you do not pull or move the catheter  This helps prevent pain and bladder spasms  Healthcare providers will show you how to use medical tape or a strap to secure the catheter tube to your body  · Keep a closed drainage system  Your Abernathy catheter should always be attached to the drainage bag to form a closed system  Do not disconnect any part of the closed system unless you need to change the bag  Care for your drainage bag:   · Ask if a leg bag is right for you  A leg bag can be worn under your clothes  Ask your healthcare provider for more information about a leg bag  · Keep the drainage bag below the level of your waist   This helps stop urine from moving back up the tubing and into your bladder  Do not loop or kink the tubing  This can cause urine to back up and collect in your bladder  Do not let the drainage bag touch or lie on the floor  · Empty the drainage bag when needed  The weight of a full drainage bag can be painful  Empty the drainage bag every 3 to 6 hours or when it is ? full  · Clean and change the drainage bag as directed  Ask your healthcare provider how often you should change the drainage bag and what cleaning solution to use  Wear disposable gloves when you change the bag  Do not allow the end of the catheter or tubing to touch anything  Clean the ends with an alcohol pad before you reconnect them  What to do if problems develop:   · No urine is draining into the bag:      ¨ Check for kinks in the tubing and straighten them out  ¨ Check the tape or strap used to secure the catheter tube to your skin  Make sure it is not blocking the tube  ¨ Make sure you are not sitting or lying on the tubing  ¨ Make sure the urine bag is hanging below the level of your waist     · Urine leaks from or around the catheter, tubing, or drainage bag:  Check if the closed drainage system has accidently come open or apart   Clean the catheter and tubing ends with a new alcohol pad and reconnect them  Prevent an infection:   · Wash your hands often  Wash before and after you touch your catheter, tubing, or drainage bag  Use soap and water  Wear clean disposable gloves when you care for your catheter or disconnect the drainage bag  Wash your hands before you prepare or eat food  · Drink liquids as directed  Ask your healthcare provider how much liquid to drink each day and which liquids are best for you  Liquids will help flush your kidneys and bladder to help prevent infection  Follow up with your healthcare provider as directed:  Write down your questions so you remember to ask them during your visits  © 2017 [facility] Information is for End User's use only and may not be sold, redistributed or otherwise used for commercial purposes  All illustrations and images included in CareNotes® are the copyrighted property of [employer]  or [employer]  The above information is an  only  It is not intended as medical advice for individual conditions or treatments  Talk to your doctor, nurse or pharmacist before following any medical regimen to see if it is safe and effective for you

## 2020-03-09 NOTE — OP NOTE
OPERATIVE REPORT  PATIENT NAME: Diane Montiel    :    MRN: [id number]  Pt Location: [facility] 04    SURGERY DATE: 3/9/2020    Surgeon(s) and Role: * Gavin Dimas MD - Primary     * Gia Feliz MD - Resident, Present     Jennifer Chen MD - Fellow, Assisting    Preop Diagnosis:  Uterovaginal prolapse, unspecified [N81 4]  Rectocele [N81 6]  Stress incontinence (female) (male) [N39 3]    Post-Op Diagnosis Codes:     * Uterovaginal prolapse, unspecified [N81 4]     * Rectocele [N81 6]     * Stress incontinence (female) (male) [N39 3]    Procedure(s) (LRB):  V E C (ENPLACE) (N/A)  ANTERIOR AND POST COLPORRHAPHY (N/A)  SINGLE INCISION SLING (N/A)  CYSTOSCOPY (N/A)    Specimen(s):  * No specimens in log *    Estimated Blood Loss:   Minimal    Drains:  * No LDAs found *    Anesthesia Type:   IV Sedation with Anesthesia    Operative Indications:  Uterovaginal prolapse, unspecified [N81 4]  Rectocele [N81 6]  Stress incontinence (female) (male) [N39 3]      Operative Findings:  1  Cystocele with Apical Prolapse  2  Posterior Wall relaxation  3  Cystoscopy: efflux noted from bilateral ureteral orifices  Mild Trabeculations, No mesh, suture material, or injury noted to bladder lumen  Complications:   None    Procedure and Technique:  Appropriate preoperative antibiotics chosen per ACOG guidelines were given  Bilateral SCDs were placed in the lower extremities for DVT prevention prior to the institution of anesthesia  No bladder, ureteral, viscus, or solid organ injury were noted at the end of the procedure  The patient was identified in the holding area by the operating room staff and attending physician  She was taken to the operating room where anesthesia was instituted without complications  She was placed in the dorsal lithotomy position with the legs in [address] [place] [address] with care taken to avoid excessive flexion or extension of her lower extremities   The patient was prepped and draped in the usual sterile fashion  A Abernathy catheter was inserted  Appropriate preoperative antibiotics chosen per ACOG guidelines were given  Bilateral SCDs were placed in the lower extremities for DVT prevention prior to the institution of anesthesia  No bladder, ureteral, viscus, or solid organ injury were noted at the end of the procedure  The patient was identified in the holding area by the operating room staff and attending physician  She was taken to the operating room where anesthesia was instituted without complications  She was placed in the dorsal lithotomy position with the legs in [address] [place] [address] with care taken to avoid excessive flexion or extension of her lower extremities  The patient was prepped and draped in the usual sterile fashion  A Abernathy catheter was inserted  Vaginal exam noted the above findings  A finger was placed in the lateral vagina, with careful palpation of the ischial spines and sacrospinous ligaments bilaterally  The right finger sleeve was then applied to the surgeon's index finger  These landmarks were then again palpated with the finger sleeve in place  Location along the sacrospinous ligament approximately in the middle 1/3 of the ligament as well as the lower 1/3 of the ligament was carefully palpated, and the trocar device loaded with the 0 Prolene suture was brought into the field  He was inserted into the finger sleeve port and the device was used to place the 0 Prolene suture at this location while the surgeons finger was firmly pressed against the ligament, approximately 2 cm medial to the ischial spine  Once the suture is anchored in place, which was confirmed by tenting of the ligament with gentle tugging, all instruments were removed from the vagina  She has the exact same procedure was performed on the contralateral side      Next, a 3 cm anterior vaginal epithelial incision was made along the anterior cervico-vaginal junction, until the cervical stroma was encountered  Then the Prolene suture was passed backwards using a needle through its entering point in the vaginal wall and medially through the cervical stromal tissue  The exact same procedure was performed on the contralateral side  These sutures were then tied down separately, one at a time until proper apical support was obtained  The vaginal epithelium was closed using 2-0 Vicryl suture in a running locked fashion with care taken to incorporate a full-thickness closure  The incision was hemostatic  Attention was turned to the anterior wall where a persistent cystocele was noted though the apex was well-supported  Two Allis clamps were applied vertically along the midline to grasp the dependent portion of the cystocele for traction  0 25% Marcaine with epinephrine diluted 1:1 with injectable saline was infiltrated into the vaginal wall using a Tuohey epidural needle for hydrodissection  Next, a midline anterior vaginal wall incision was made to split the vaginal epithelium from the underlying muscularis  This incision was extended to the level of the urethrovesical junction distally and the cervix proximally  The epithelium was further  from the vaginal muscularis sharply with tenotomy scissors and bluntly with peanut sponges  The vaginal wall was then plicated in a horizontal imbricating fashion using 2-0 Vicryl x 2  The epithelium was trimmed  The incision was closed with 2-0 Vicryl in a running locked fashion  Next, we turned our attention to the single incision sling  The mid urethral zone was identified by reference to the Abernathy catheter and urethral meatus  Local anesthetic solution was injected into the anterior vaginal wall muscularis at the mid urethral level for hydrodissection and vasoconstriction, 10 mL was injected at the midline   Next, an additional 10 mL was injected to the left and right of midline directing the infiltration laterally towards the cephalad aspect of the inferior pubic ramus bilaterally  Care was taken to ensure that the anterolateral sulcus was flattened by the infiltration to minimize chance for buttonholing or sling (tape) becoming too close to the anterolateral sulcus  After completion of hydrodissection, 2 Allis clamps were used to grasp the anterior vaginal wall for traction  A 1 5 cm incision was made into the midline through mucosa and muscularis  Two Allis clamps were then placed on each cut edge of the incision for stabilization  Tenotomy scissors were used to create a small vaginal tunnel with sharp and blunt dissection above the anterior vaginal wall directed laterally towards the cephalad aspect of the inferior pubic ramus bilaterally  Dissection was carried out to the edge of the bone itself but without dissection into the obturator internus muscle  Once the dissection was complete, the Altis sling system was placed  An index finger was placed in the vagina for guidance  The Altis trocar and fixed anchor was placed into the pre-dissected tract  The handle was held horizontally in a slight upward angle to avoid buttonholing of the sulcus  Cephalad drift was used to allow passage around the inferior pubic ramus  A thumb was placed on the heel of the introducer to allow a lateral followed by a rotation push maneuver to place a fixed anchor into the obturator internus muscle membrane complex on the patient's left side  Proper handle deviation confirmed proper anchor placement, as well as inspection of the sling itself  Once the introducer was removed, proper anchor placement was confirmed by gentle tugging on the sling  The exact same sequence of steps was repeated on the patient's right side with the adjustable anchor and trocar  Once placement of dynamic anchor was completed, the introducer was removed and gentle tugging again confirmed proper anchor placement   The Abernathy catheter was then removed and a diagnostic cystoscopy was performed by instilling 300 mL of fluid into the bladder  Both ureters were effluxing normally and there were no lacerations or evidence of injury to the lower urinary tract  The tensioning suture was used to adjust the tape until there was minimal to no leakage with Crede  After appropriate tensioning, the tensioning suture was cut  The vaginal incision was closed with 2-0 Vicryl suture in a running locked stitch  Attention was then turned to the posterior compartment where 2 Allis clamps were placed in the posterior fourchette over the mucocutaneous border to reduce the markedly relaxed vaginal outlet  Dilute Marcaine solution was injected into the perineum  A suzan-shaped incision was made extending from the vaginal mucosa onto the perineum  The overlying skin was removed en bloc  We then began closure of the posterior colporrhaphy with a 2-0 Vicryl suture in a running locked stitch  We reapproximated the perineal body with a 0-Vicryl interrupted suture  We closed the remainder of the colporrhaphy to the level of the hymen  We closed the perineal skin with 2-0 Vicryl in a subcuticular fashion  The Abernathy catheter was removed  Vaginal packing was placed in the vagina  The sponge, needle and instrument count were correct x 2  The patient tolerated the procedure well  She was awakened from anesthesia and transferred to the recovery room in stable condition  A qualified resident physician was not available  Dr Nguyen Nagy was present for the entire procedure      Patient Disposition:  PACU     SIGNATURE: Donald Soria MD  DATE: March 9, 2020  TIME: 11:55 AM

## 2020-03-10 LAB — GLUCOSE SERPL-MCNC: 80 MG/DL (ref 65–140)

## 2020-11-12 DIAGNOSIS — Z11.59 SCREENING FOR VIRAL DISEASE: ICD-10-CM

## 2020-11-12 PROCEDURE — U0003 INFECTIOUS AGENT DETECTION BY NUCLEIC ACID (DNA OR RNA); SEVERE ACUTE RESPIRATORY SYNDROME CORONAVIRUS 2 (SARS-COV-2) (CORONAVIRUS DISEASE [COVID-19]), AMPLIFIED PROBE TECHNIQUE, MAKING USE OF HIGH THROUGHPUT TECHNOLOGIES AS DESCRIBED BY CMS-2020-01-R: HCPCS | Performed by: OBSTETRICS & GYNECOLOGY

## 2020-11-13 ENCOUNTER — ANESTHESIA EVENT (OUTPATIENT)
Dept: PERIOP | Facility: HOSPITAL | Age: 70
End: 2020-11-13
Payer: COMMERCIAL

## 2020-11-13 LAB — SARS-COV-2 RNA SPEC QL NAA+PROBE: NOT DETECTED

## 2020-11-16 ENCOUNTER — ANESTHESIA (OUTPATIENT)
Dept: PERIOP | Facility: HOSPITAL | Age: 70
End: 2020-11-16
Payer: COMMERCIAL

## 2020-11-16 ENCOUNTER — HOSPITAL ENCOUNTER (OUTPATIENT)
Facility: HOSPITAL | Age: 70
Setting detail: OUTPATIENT SURGERY
Discharge: HOME | End: 2020-11-16
Attending: OBSTETRICS & GYNECOLOGY | Admitting: OBSTETRICS & GYNECOLOGY
Payer: COMMERCIAL

## 2020-11-16 VITALS — HEART RATE: 87 BPM

## 2020-11-16 VITALS
SYSTOLIC BLOOD PRESSURE: 121 MMHG | WEIGHT: 165 LBS | HEIGHT: 68 IN | TEMPERATURE: 97.5 F | OXYGEN SATURATION: 97 % | RESPIRATION RATE: 16 BRPM | DIASTOLIC BLOOD PRESSURE: 64 MMHG | HEART RATE: 63 BPM | BODY MASS INDEX: 25.01 KG/M2

## 2020-11-16 DIAGNOSIS — N81.10 FEMALE BLADDER PROLAPSE: Primary | ICD-10-CM

## 2020-11-16 DIAGNOSIS — Z11.59 SCREENING FOR VIRAL DISEASE: ICD-10-CM

## 2020-11-16 PROBLEM — N81.89 OTHER FEMALE GENITAL PROLAPSE: Status: ACTIVE | Noted: 2020-11-16

## 2020-11-16 PROBLEM — N81.2 INCOMPLETE UTEROVAGINAL PROLAPSE: Status: ACTIVE | Noted: 2020-11-16

## 2020-11-16 PROBLEM — N81.11 CYSTOCELE, MIDLINE: Status: ACTIVE | Noted: 2020-11-16

## 2020-11-16 PROBLEM — N81.84 PELVIC MUSCLE WASTING: Status: ACTIVE | Noted: 2020-11-16

## 2020-11-16 LAB — GLUCOSE SERPL-MCNC: 75 MG/DL (ref 65–140)

## 2020-11-16 PROCEDURE — 82948 REAGENT STRIP/BLOOD GLUCOSE: CPT

## 2020-11-16 PROCEDURE — 51798 US URINE CAPACITY MEASURE: CPT | Performed by: OBSTETRICS & GYNECOLOGY

## 2020-11-16 DEVICE — FIXATION KIT POP ENPLACE (NEUGUIDE): Type: IMPLANTABLE DEVICE | Status: FUNCTIONAL

## 2020-11-16 RX ORDER — DOCUSATE SODIUM 100 MG/1
100 CAPSULE, LIQUID FILLED ORAL 2 TIMES DAILY
Qty: 10 CAPSULE | Refills: 0
Start: 2020-11-16

## 2020-11-16 RX ORDER — FENTANYL CITRATE 50 UG/ML
INJECTION, SOLUTION INTRAMUSCULAR; INTRAVENOUS AS NEEDED
Status: DISCONTINUED | OUTPATIENT
Start: 2020-11-16 | End: 2020-11-16

## 2020-11-16 RX ORDER — ONDANSETRON 2 MG/ML
4 INJECTION INTRAMUSCULAR; INTRAVENOUS EVERY 6 HOURS PRN
Status: DISCONTINUED | OUTPATIENT
Start: 2020-11-16 | End: 2020-11-16 | Stop reason: HOSPADM

## 2020-11-16 RX ORDER — PROPOFOL 10 MG/ML
INJECTION, EMULSION INTRAVENOUS AS NEEDED
Status: DISCONTINUED | OUTPATIENT
Start: 2020-11-16 | End: 2020-11-16

## 2020-11-16 RX ORDER — SODIUM CHLORIDE 9 MG/ML
INJECTION, SOLUTION INTRAVENOUS AS NEEDED
Status: DISCONTINUED | OUTPATIENT
Start: 2020-11-16 | End: 2020-11-16 | Stop reason: HOSPADM

## 2020-11-16 RX ORDER — PROPOFOL 10 MG/ML
INJECTION, EMULSION INTRAVENOUS CONTINUOUS PRN
Status: DISCONTINUED | OUTPATIENT
Start: 2020-11-16 | End: 2020-11-16

## 2020-11-16 RX ORDER — HYDROMORPHONE HCL/PF 1 MG/ML
0.5 SYRINGE (ML) INJECTION
Status: DISCONTINUED | OUTPATIENT
Start: 2020-11-16 | End: 2020-11-16 | Stop reason: HOSPADM

## 2020-11-16 RX ORDER — MIDAZOLAM HYDROCHLORIDE 2 MG/2ML
INJECTION, SOLUTION INTRAMUSCULAR; INTRAVENOUS AS NEEDED
Status: DISCONTINUED | OUTPATIENT
Start: 2020-11-16 | End: 2020-11-16

## 2020-11-16 RX ORDER — ACETAMINOPHEN 325 MG/1
650 TABLET ORAL EVERY 6 HOURS PRN
Status: DISCONTINUED | OUTPATIENT
Start: 2020-11-16 | End: 2020-11-16 | Stop reason: HOSPADM

## 2020-11-16 RX ORDER — SODIUM CHLORIDE 9 MG/ML
125 INJECTION, SOLUTION INTRAVENOUS CONTINUOUS
Status: DISCONTINUED | OUTPATIENT
Start: 2020-11-16 | End: 2020-11-16 | Stop reason: HOSPADM

## 2020-11-16 RX ORDER — FENTANYL CITRATE/PF 50 MCG/ML
50 SYRINGE (ML) INJECTION
Status: DISCONTINUED | OUTPATIENT
Start: 2020-11-16 | End: 2020-11-16 | Stop reason: HOSPADM

## 2020-11-16 RX ORDER — IBUPROFEN 600 MG/1
600 TABLET ORAL EVERY 6 HOURS PRN
Status: DISCONTINUED | OUTPATIENT
Start: 2020-11-16 | End: 2020-11-16 | Stop reason: HOSPADM

## 2020-11-16 RX ORDER — MEPERIDINE HYDROCHLORIDE 25 MG/ML
12.5 INJECTION INTRAMUSCULAR; INTRAVENOUS; SUBCUTANEOUS ONCE AS NEEDED
Status: DISCONTINUED | OUTPATIENT
Start: 2020-11-16 | End: 2020-11-16 | Stop reason: HOSPADM

## 2020-11-16 RX ORDER — CEFAZOLIN SODIUM 1 G/50ML
1000 SOLUTION INTRAVENOUS ONCE
Status: COMPLETED | OUTPATIENT
Start: 2020-11-16 | End: 2020-11-16

## 2020-11-16 RX ORDER — FUROSEMIDE 10 MG/ML
INJECTION INTRAMUSCULAR; INTRAVENOUS AS NEEDED
Status: DISCONTINUED | OUTPATIENT
Start: 2020-11-16 | End: 2020-11-16

## 2020-11-16 RX ORDER — ONDANSETRON 2 MG/ML
4 INJECTION INTRAMUSCULAR; INTRAVENOUS ONCE AS NEEDED
Status: DISCONTINUED | OUTPATIENT
Start: 2020-11-16 | End: 2020-11-16 | Stop reason: HOSPADM

## 2020-11-16 RX ADMIN — PROPOFOL 40 MG: 10 INJECTION, EMULSION INTRAVENOUS at 15:36

## 2020-11-16 RX ADMIN — FUROSEMIDE 10 MG: 10 INJECTION, SOLUTION INTRAMUSCULAR; INTRAVENOUS at 16:47

## 2020-11-16 RX ADMIN — SODIUM CHLORIDE: 0.9 INJECTION, SOLUTION INTRAVENOUS at 15:53

## 2020-11-16 RX ADMIN — CEFAZOLIN SODIUM 1000 MG: 1 SOLUTION INTRAVENOUS at 15:22

## 2020-11-16 RX ADMIN — PROPOFOL 70 MG: 10 INJECTION, EMULSION INTRAVENOUS at 15:53

## 2020-11-16 RX ADMIN — ONDANSETRON 4 MG: 2 INJECTION INTRAMUSCULAR; INTRAVENOUS at 18:24

## 2020-11-16 RX ADMIN — FENTANYL CITRATE 50 MCG: 50 INJECTION, SOLUTION INTRAMUSCULAR; INTRAVENOUS at 16:24

## 2020-11-16 RX ADMIN — IBUPROFEN 600 MG: 600 TABLET ORAL at 18:00

## 2020-11-16 RX ADMIN — CEFAZOLIN SODIUM 1000 MG: 1 SOLUTION INTRAVENOUS at 15:18

## 2020-11-16 RX ADMIN — FENTANYL CITRATE 50 MCG: 50 INJECTION INTRAMUSCULAR; INTRAVENOUS at 17:13

## 2020-11-16 RX ADMIN — MIDAZOLAM 2 MG: 1 INJECTION INTRAMUSCULAR; INTRAVENOUS at 15:33

## 2020-11-16 RX ADMIN — FENTANYL CITRATE 50 MCG: 50 INJECTION INTRAMUSCULAR; INTRAVENOUS at 17:23

## 2020-11-16 RX ADMIN — SODIUM CHLORIDE: 0.9 INJECTION, SOLUTION INTRAVENOUS at 15:36

## 2020-11-16 RX ADMIN — FENTANYL CITRATE 100 MCG: 50 INJECTION, SOLUTION INTRAMUSCULAR; INTRAVENOUS at 15:36

## 2020-11-16 RX ADMIN — FENTANYL CITRATE 50 MCG: 50 INJECTION, SOLUTION INTRAMUSCULAR; INTRAVENOUS at 16:44

## 2020-11-16 RX ADMIN — PROPOFOL 80 MCG/KG/MIN: 10 INJECTION, EMULSION INTRAVENOUS at 15:36

## 2020-11-16 RX ADMIN — SODIUM CHLORIDE 125 ML/HR: 0.9 INJECTION, SOLUTION INTRAVENOUS at 15:18

## 2021-12-29 ENCOUNTER — HOSPITAL ENCOUNTER (EMERGENCY)
Facility: HOSPITAL | Age: 71
Discharge: HOME | End: 2021-12-30
Attending: EMERGENCY MEDICINE | Admitting: EMERGENCY MEDICINE
Payer: COMMERCIAL

## 2021-12-29 DIAGNOSIS — U07.1 COVID-19: Primary | ICD-10-CM

## 2021-12-29 PROCEDURE — 99284 EMERGENCY DEPT VISIT MOD MDM: CPT

## 2021-12-29 PROCEDURE — 99285 EMERGENCY DEPT VISIT HI MDM: CPT | Performed by: EMERGENCY MEDICINE

## 2021-12-29 PROCEDURE — 83690 ASSAY OF LIPASE: CPT | Performed by: EMERGENCY MEDICINE

## 2021-12-29 PROCEDURE — 36415 COLL VENOUS BLD VENIPUNCTURE: CPT | Performed by: EMERGENCY MEDICINE

## 2021-12-29 PROCEDURE — 0241U HB NFCT DS VIR RESP RNA 4 TRGT: CPT | Performed by: EMERGENCY MEDICINE

## 2021-12-29 PROCEDURE — 85025 COMPLETE CBC W/AUTO DIFF WBC: CPT | Performed by: EMERGENCY MEDICINE

## 2021-12-29 PROCEDURE — 80053 COMPREHEN METABOLIC PANEL: CPT | Performed by: EMERGENCY MEDICINE

## 2021-12-30 ENCOUNTER — APPOINTMENT (EMERGENCY)
Dept: CT IMAGING | Facility: HOSPITAL | Age: 71
End: 2021-12-30
Payer: COMMERCIAL

## 2021-12-30 VITALS
BODY MASS INDEX: 23.57 KG/M2 | OXYGEN SATURATION: 99 % | SYSTOLIC BLOOD PRESSURE: 130 MMHG | WEIGHT: 155 LBS | DIASTOLIC BLOOD PRESSURE: 70 MMHG | RESPIRATION RATE: 18 BRPM | HEART RATE: 80 BPM | TEMPERATURE: 98 F

## 2021-12-30 LAB
ALBUMIN SERPL BCP-MCNC: 3.7 G/DL (ref 3.5–5)
ALP SERPL-CCNC: 80 U/L (ref 46–116)
ALT SERPL W P-5'-P-CCNC: 58 U/L (ref 12–78)
ANION GAP SERPL CALCULATED.3IONS-SCNC: 9 MMOL/L (ref 4–13)
AST SERPL W P-5'-P-CCNC: 30 U/L (ref 5–45)
BASOPHILS # BLD AUTO: 0 THOUSANDS/ΜL (ref 0–0.1)
BASOPHILS NFR BLD AUTO: 0 % (ref 0–1)
BILIRUB SERPL-MCNC: 1.03 MG/DL (ref 0.2–1)
BUN SERPL-MCNC: 12 MG/DL (ref 5–25)
CALCIUM SERPL-MCNC: 8.9 MG/DL (ref 8.3–10.1)
CARDIAC TROPONIN I PNL SERPL HS: 3 NG/L
CHLORIDE SERPL-SCNC: 103 MMOL/L (ref 100–108)
CO2 SERPL-SCNC: 26 MMOL/L (ref 21–32)
CREAT SERPL-MCNC: 0.63 MG/DL (ref 0.6–1.3)
EOSINOPHIL # BLD AUTO: 0 THOUSAND/ΜL (ref 0–0.61)
EOSINOPHIL NFR BLD AUTO: 0 % (ref 0–6)
ERYTHROCYTE [DISTWIDTH] IN BLOOD BY AUTOMATED COUNT: 12.6 % (ref 11.6–15.1)
FLUAV RNA RESP QL NAA+PROBE: NEGATIVE
FLUBV RNA RESP QL NAA+PROBE: NEGATIVE
GFR SERPL CREATININE-BSD FRML MDRD: 89 ML/MIN/1.73SQ M
GLUCOSE SERPL-MCNC: 104 MG/DL (ref 65–140)
HCT VFR BLD AUTO: 42.3 % (ref 34.8–46.1)
HGB BLD-MCNC: 14.2 G/DL (ref 11.5–15.4)
IMM GRANULOCYTES # BLD AUTO: 0.01 THOUSAND/UL (ref 0–0.2)
IMM GRANULOCYTES NFR BLD AUTO: 0 % (ref 0–2)
LIPASE SERPL-CCNC: 91 U/L (ref 73–393)
LYMPHOCYTES # BLD AUTO: 0.67 THOUSANDS/ΜL (ref 0.6–4.47)
LYMPHOCYTES NFR BLD AUTO: 23 % (ref 14–44)
MCH RBC QN AUTO: 29.9 PG (ref 26.8–34.3)
MCHC RBC AUTO-ENTMCNC: 33.6 G/DL (ref 31.4–37.4)
MCV RBC AUTO: 89 FL (ref 82–98)
MONOCYTES # BLD AUTO: 0.49 THOUSAND/ΜL (ref 0.17–1.22)
MONOCYTES NFR BLD AUTO: 17 % (ref 4–12)
NEUTROPHILS # BLD AUTO: 1.79 THOUSANDS/ΜL (ref 1.85–7.62)
NEUTS SEG NFR BLD AUTO: 60 % (ref 43–75)
NRBC BLD AUTO-RTO: 0 /100 WBCS
PLATELET # BLD AUTO: 185 THOUSANDS/UL (ref 149–390)
PMV BLD AUTO: 8.7 FL (ref 8.9–12.7)
POTASSIUM SERPL-SCNC: 3.9 MMOL/L (ref 3.5–5.3)
PROT SERPL-MCNC: 7.1 G/DL (ref 6.4–8.2)
RBC # BLD AUTO: 4.75 MILLION/UL (ref 3.81–5.12)
RSV RNA RESP QL NAA+PROBE: NEGATIVE
SARS-COV-2 RNA RESP QL NAA+PROBE: POSITIVE
SODIUM SERPL-SCNC: 138 MMOL/L (ref 136–145)
WBC # BLD AUTO: 2.96 THOUSAND/UL (ref 4.31–10.16)

## 2021-12-30 PROCEDURE — 96361 HYDRATE IV INFUSION ADD-ON: CPT

## 2021-12-30 PROCEDURE — G1004 CDSM NDSC: HCPCS

## 2021-12-30 PROCEDURE — 84484 ASSAY OF TROPONIN QUANT: CPT | Performed by: EMERGENCY MEDICINE

## 2021-12-30 PROCEDURE — 96374 THER/PROPH/DIAG INJ IV PUSH: CPT

## 2021-12-30 PROCEDURE — 74177 CT ABD & PELVIS W/CONTRAST: CPT

## 2021-12-30 PROCEDURE — 93005 ELECTROCARDIOGRAM TRACING: CPT

## 2021-12-30 PROCEDURE — 36415 COLL VENOUS BLD VENIPUNCTURE: CPT | Performed by: EMERGENCY MEDICINE

## 2021-12-30 RX ORDER — DICYCLOMINE HCL 20 MG
20 TABLET ORAL 2 TIMES DAILY PRN
Qty: 20 TABLET | Refills: 0 | Status: SHIPPED | OUTPATIENT
Start: 2021-12-30

## 2021-12-30 RX ORDER — ONDANSETRON 2 MG/ML
4 INJECTION INTRAMUSCULAR; INTRAVENOUS ONCE
Status: COMPLETED | OUTPATIENT
Start: 2021-12-30 | End: 2021-12-30

## 2021-12-30 RX ORDER — ONDANSETRON 4 MG/1
4 TABLET, ORALLY DISINTEGRATING ORAL EVERY 8 HOURS PRN
Qty: 10 TABLET | Refills: 0 | Status: SHIPPED | OUTPATIENT
Start: 2021-12-30

## 2021-12-30 RX ORDER — ACETAMINOPHEN 325 MG/1
975 TABLET ORAL ONCE
Status: COMPLETED | OUTPATIENT
Start: 2021-12-30 | End: 2021-12-30

## 2021-12-30 RX ADMIN — ONDANSETRON 4 MG: 2 INJECTION INTRAMUSCULAR; INTRAVENOUS at 00:46

## 2021-12-30 RX ADMIN — ACETAMINOPHEN 975 MG: 325 TABLET, FILM COATED ORAL at 00:12

## 2021-12-30 RX ADMIN — SODIUM CHLORIDE 1000 ML: 0.9 INJECTION, SOLUTION INTRAVENOUS at 00:47

## 2021-12-30 RX ADMIN — IOHEXOL 100 ML: 350 INJECTION, SOLUTION INTRAVENOUS at 01:27

## 2021-12-30 NOTE — ED PROVIDER NOTES
History  Chief Complaint   Patient presents with    Diarrhea     pt c/o NVD all day with headache  pt unable to keep down any solids or fluids     66 y o  female presents with a chief complaint of "I can't keep anything down "  Patient states the symptoms started last night with numerous episodes of vomiting along with episodes of diarrhea  Patient left sided abdominal pain without radiation  Patient describes achy pain that came on after vomiting and continues in the ER  Patient states vomiting aggravates the pain and nothing alleviates it  Patient affirms aching arthralgias and a headache  Patient affirms chills without fever  Patient denies any urinary symptoms  Patient denies vaginal bleeding or discharge  Patient denies contacts with similar symptoms  Patient notes history of prior bladder surgery  Focused Objective Exam:  Abdomen:  Inspection of an abdomen without previous abdominal surgical incisions noted without erythema, rashes or ecchymosis noted  No abdominal pulsations noted  Normal bowel sounds with no bruit auscultated  Soft abdomen  Palpitation noted tenderness in the left upper and lower quadrants with no specific tenderness on the right; tenderness not over McBurneys point  No masses or pulsatile aorta noted on examination  No rebound or guarding noted on examination, non-peritoneal exam   Back:  No rash or signs of herpes zoster  Skin:  No ecchymosis of the umbilicus (negative Frankys sign) or flank (negative Dawn Morels sign)  Warm and dry  Medical Decision Making  Abdominal pain with a broad differential   Will establish IV access and make patient NPO considering possibility of surgical intervention required  Will initiate symptomatic management including intravenous fluids  As patient has history of risk factors for ACS, will obtain EKG and troponin to evaluate for potential referred pain        Differential includes significant likelihood of intra-abdominal pathology, including concerns for potential appendicitis  Will obtain CBC to evaluate for anemia and leukocytosis  Will obtain CMP to evaluate electrolytes, renal, biliary, and hepatic function  Will obtain lipase to evaluate for potential pancreatitis  Based on patients age, history, physical exam and presenting complaint, will obtain contrast enhanced CT imaging of patients abdomen and pelvis to further evaluate for possible intraabdominal pathology  History provided by:  Patient  Diarrhea  Quality:  Watery  Severity:  Moderate  Onset quality:  Gradual  Timing:  Constant  Progression:  Unchanged  Relieved by:  Nothing  Worsened by:  Nothing  Associated symptoms: abdominal pain, arthralgias, chills, headaches, myalgias and vomiting    Associated symptoms: no recent cough, no diaphoresis, no fever and no URI        Prior to Admission Medications   Prescriptions Last Dose Informant Patient Reported? Taking?    Chlorphen-Phenyleph-Methscop (DEHISTINE PO)   Yes No   Sig: Take by mouth Dehist herbal for allergies   Omega-3 Fatty Acids (OMEGA-3 FISH OIL PO)   Yes No   Sig: Take by mouth   acetaminophen (TYLENOL) 500 mg tablet   No No   Sig: Take 1 tablet (500 mg total) by mouth every 6 (six) hours as needed for mild pain   b complex vitamins tablet   Yes No   Sig: Take 1 tablet by mouth daily   cholecalciferol (VITAMIN D3) 1,000 units tablet   Yes No   Sig: Take 1,000 Units by mouth daily   docusate sodium (COLACE) 100 mg capsule   No No   Sig: Take 1 capsule (100 mg total) by mouth 2 (two) times a day   ibuprofen (MOTRIN) 600 mg tablet   No No   Sig: Take 1 tablet (600 mg total) by mouth every 6 (six) hours as needed for mild pain      Facility-Administered Medications: None       Past Medical History:   Diagnosis Date    Arthritis     stiff knees    Exercises daily     Female bladder prolapse     VEC today 11/16/2020     pelvic repair 3/2020    Hyperlipidemia     borderline    Hypoglycemia     Lyme disease     3 years ago    Migraine     not recent    Osteoporosis     borderline    Urinary incontinence     at times    Urinary urgency     Uterine prolapse     Wears dentures     bridge lower    Wears glasses        Past Surgical History:   Procedure Laterality Date    MI CMBND ANTERPOST COLPORRAPHY W/CYSTO N/A 11/16/2020    Procedure: ANT COLPORRHAPHY;  Surgeon: Nerissa Jarquin MD;  [facility];  Service: UroGynecology           MI CYSTOURETHROSCOPY N/A 3/9/2020    Procedure: Luisa Mathur;  Surgeon: Nerissa Jarquin MD;  [facility];  Service: UroGynecology           MI CYSTOURETHROSCOPY N/A 11/16/2020    Procedure: Luisa Mathur;  Surgeon: Nerissa Jarquin MD;  [facility];  Service: UroGynecology           MI POST COLPORRHAPHY,RECTUM/VAGINA N/A 3/9/2020    Procedure: ANTERIOR AND POST COLPORRHAPHY;  Surgeon: Nerissa Jarquin MD;  [facility];  Service: UroGynecology           MI [place] LIG N/A 3/9/2020    Procedure: V E C (ENPLACE); Surgeon: Nerissa Jarquin MD;  [facility];  Service: UroGynecology           MI REVAGINAL PROLAPSE,SACROSP [address] N/A 11/16/2020    Procedure: V E  C  w/ enplace;  Surgeon: Nerissa Jarquin MD;  [facility];  Service: UroGynecology           MI SLING OPER STRES INCONTINENCE N/A 3/9/2020    Procedure: SINGLE INCISION SLING;  Surgeon: Nerissa Jarquin MD;  [facility];  Service: UroGynecology           VAGINAL DELIVERY      "46 yrs ago did have sedation"    WISDOM TOOTH EXTRACTION         Family History   Problem Relation Age of Onset    Osteoporosis Mother     Stroke Father     Diabetes Father     Diabetes Sister      I have reviewed and agree with the history as documented      E-Cigarette/Vaping     E-Cigarette/Vaping Substances     Social History     Tobacco Use    Smoking status: Never Smoker    Smokeless tobacco: Never Used   Substance Use Topics    Alcohol use: No    Drug use: No       Review of Systems   Constitutional: Positive for chills  Negative for diaphoresis and fever  Gastrointestinal: Positive for abdominal pain, diarrhea and vomiting  Musculoskeletal: Positive for arthralgias and myalgias  Neurological: Positive for headaches  All other systems reviewed and are negative  Physical Exam  Physical Exam  Vitals reviewed  HENT:      Head: Atraumatic  Eyes:      General: No scleral icterus  Cardiovascular:      Rate and Rhythm: Normal rate and regular rhythm  Pulmonary:      Effort: Pulmonary effort is normal       Breath sounds: Normal breath sounds  Abdominal:      General: There is no distension  Tenderness: There is abdominal tenderness  There is no guarding or rebound  Musculoskeletal:         General: No deformity  Cervical back: Neck supple  Skin:     General: Skin is warm and dry  Neurological:      General: No focal deficit present  Mental Status: She is alert     Psychiatric:         Mood and Affect: Mood normal          Vital Signs  ED Triage Vitals [12/29/21 1902]   Temperature Pulse Respirations Blood Pressure SpO2   98 °F (36 7 °C) 77 18 134/62 98 %      Temp Source Heart Rate Source Patient Position - Orthostatic VS BP Location FiO2 (%)   Temporal Monitor Sitting Left arm --      Pain Score       --           Vitals:    12/29/21 1902 12/30/21 0002   BP: 134/62    Pulse: 77 79   Patient Position - Orthostatic VS: Sitting          Visual Acuity      ED Medications  Medications   acetaminophen (TYLENOL) tablet 975 mg (975 mg Oral Given 12/30/21 0012)   ondansetron (ZOFRAN) injection 4 mg (4 mg Intravenous Given 12/30/21 0046)   sodium chloride 0 9 % bolus 1,000 mL (0 mL Intravenous Stopped 12/30/21 0147)   iohexol (OMNIPAQUE) 350 MG/ML injection (MULTI-DOSE) 100 mL (100 mL Intravenous Given 12/30/21 0127)       Diagnostic Studies  Results Reviewed     Procedure Component Value Units Date/Time    HS Troponin 0hr (reflex protocol) [[id number]]  (Normal) Collected: 12/30/21 0111    Lab Status: Final result Specimen: Blood from Arm, Right Updated: 12/30/21 0144     hs TnI 0hr 3 ng/L     HS Troponin I 2hr [[id number]]     Lab Status: No result Specimen: Blood     COVID/FLU/RSV [[id number]]  (Abnormal) Collected: 12/29/21 2359    Lab Status: Final result Specimen: Nares from Nasopharyngeal Swab Updated: 12/30/21 0109     SARS-CoV-2 Positive     INFLUENZA A PCR Negative     INFLUENZA B PCR Negative     RSV PCR Negative    Narrative:      FOR PEDIATRIC PATIENTS - copy/paste COVID Guidelines URL to browser: [email or web address]  [email or web address]     This test has been authorized by FDA under an EUA (Emergency Use Assay) for use by authorized laboratories  Clinical caution and judgement should be used with the interpretation of these results with consideration of the clinical impression and other laboratory testing  Testing reported as "Positive" or "Negative" has been proven to be accurate according to standard laboratory validation requirements  All testing is performed with control materials showing appropriate reactivity at standard intervals      Comprehensive metabolic panel [[id number]]  (Abnormal) Collected: 12/29/21 2359    Lab Status: Final result Specimen: Blood from Arm, Right Updated: 12/30/21 0043     Sodium 138 mmol/L      Potassium 3 9 mmol/L      Chloride 103 mmol/L      CO2 26 mmol/L      ANION GAP 9 mmol/L      BUN 12 mg/dL      Creatinine 0 63 mg/dL      Glucose 104 mg/dL      Calcium 8 9 mg/dL      AST 30 U/L      ALT 58 U/L      Alkaline Phosphatase 80 U/L      Total Protein 7 1 g/dL      Albumin 3 7 g/dL      Total Bilirubin 1 03 mg/dL      eGFR 89 ml/min/1 73sq m     Narrative:      [place] guidelines for Chronic Kidney Disease (CKD):     Stage 1 with normal or high GFR (GFR > 90 mL/min/1 73 square meters)    Stage 2 Mild CKD (GFR = 60-89 mL/min/1 73 square meters)    Stage 3A Moderate CKD (GFR = 45-59 mL/min/1 73 square meters)    Stage 3B Moderate CKD (GFR = 30-44 mL/min/1 73 square meters)    Stage 4 Severe CKD (GFR = 15-29 mL/min/1 73 square meters)    Stage 5 End Stage CKD (GFR <15 mL/min/1 73 square meters)  Note: GFR calculation is accurate only with a steady state creatinine    Lipase [[id number]]  (Normal) Collected: 12/29/21 2359    Lab Status: Final result Specimen: Blood from Arm, Right Updated: 12/30/21 0043     Lipase 91 u/L     CBC and differential [[id number]]  (Abnormal) Collected: 12/29/21 2359    Lab Status: Final result Specimen: Blood from Arm, Right Updated: 12/30/21 0025     WBC 2 96 Thousand/uL      RBC 4 75 Million/uL      Hemoglobin 14 2 g/dL      Hematocrit 42 3 %      MCV 89 fL      MCH 29 9 pg      MCHC 33 6 g/dL      RDW 12 6 %      MPV 8 7 fL      Platelets 179 Thousands/uL      nRBC 0 /100 WBCs      Neutrophils Relative 60 %      Immat GRANS % 0 %      Lymphocytes Relative 23 %      Monocytes Relative 17 %      Eosinophils Relative 0 %      Basophils Relative 0 %      Neutrophils Absolute 1 79 Thousands/µL      Immature Grans Absolute 0 01 Thousand/uL      Lymphocytes Absolute 0 67 Thousands/µL      Monocytes Absolute 0 49 Thousand/µL      Eosinophils Absolute 0 00 Thousand/µL      Basophils Absolute 0 00 Thousands/µL                  CT abdomen pelvis with contrast    (Results Pending)              Procedures  Procedures         ED Course  ED Course as of 12/30/21 0237   u Dec 30, 2021   0227 IMPRESSION:  No obstruction  No hydronephrosis  1  There is colonic diverticulosis identified which appears to be most prominent in the sigmoid, without diverticulitis  2  Diffuse urinary bladder wall thickening is noted correlate for lack of distention or cystitis   0228 Patient's laboratory evaluation consistent with COVID-19 as the source of patient's symptoms  Patient with normal renal function    Patient's pulse oximetry has been normal   Patient tolerating oral intake  Discussed continued symptomatic management with the patient  Prescribe course of ondansetron along dicyclomine for symptomatic management  Discussed emphasized close monitoring of pulse oximetry at home  CT imaging read by Mariaelena without acute finding  There is bladder wall thickening but patient has no urinary symptoms and unlikely to be the source of any of patient's symptoms  Discussed these findings with the patient  Discussed continued follow-up and emphasized return precautions  MDM    Disposition  Final diagnoses:   [id number]     Time reflects when diagnosis was documented in both MDM as applicable and the Disposition within this note     Time User Action Codes Description Comment    12/30/2021  1:55 AM Sae Chavarria Add [U07 1] COVID-19       ED Disposition     ED Disposition Condition Date/Time Comment    Discharge Stable Thu Dec 30, 2021  1:55 AM Dwaine Zuñiga discharge to home/self care  Follow-up Information     Follow up With Specialties Details Why Contact Info Additional [address], DO Family Medicine Call today To discuss your candidacy for antibody treatment  Schedule follow up every 2 days with televisits   [address] [facility] [id number] [place] [address] Emergency Department Emergency Medicine Go to  If symptoms worsen [address] Emergency Department, [address] [place] [address], [facility]          Patient's Medications   Discharge Prescriptions    DICYCLOMINE (BENTYL) 20 MG TABLET    Take 1 tablet (20 mg total) by mouth 2 (two) times a day as needed (Abdominal pain)       Start Date: 12/30/2021End Date: --       Order Dose: 20 mg       Quantity: 20 tablet    Refills: 0    ONDANSETRON (ZOFRAN-ODT) 4 MG DISINTEGRATING TABLET Take 1 tablet (4 mg total) by mouth every 8 (eight) hours as needed for nausea or vomiting       Start Date: 12/30/2021End Date: --       Order Dose: 4 mg       Quantity: 10 tablet    Refills: 0       No discharge procedures on file      PDMP Review     None          ED Provider  Electronically Signed by           Yudith Delcid MD  12/30/21 5723

## 2021-12-31 LAB
ATRIAL RATE: 70 BPM
P AXIS: 75 DEGREES
PR INTERVAL: 188 MS
QRS AXIS: 60 DEGREES
QRSD INTERVAL: 76 MS
QT INTERVAL: 416 MS
QTC INTERVAL: 449 MS
T WAVE AXIS: 73 DEGREES
VENTRICULAR RATE: 70 BPM

## 2021-12-31 PROCEDURE — 93010 ELECTROCARDIOGRAM REPORT: CPT | Performed by: INTERNAL MEDICINE

## 2023-12-19 ENCOUNTER — ANTICOAG VISIT (OUTPATIENT)
Dept: FAMILY MEDICINE CLINIC | Facility: CLINIC | Age: 73
End: 2023-12-19

## 2023-12-19 LAB — INR PPP: 3.4 (ref 0.84–1.19)

## 2024-01-17 ENCOUNTER — TELEPHONE (OUTPATIENT)
Dept: FAMILY MEDICINE CLINIC | Facility: CLINIC | Age: 74
End: 2024-01-17

## 2024-02-21 PROBLEM — H61.23 CERUMEN DEBRIS ON TYMPANIC MEMBRANE OF BOTH EARS: Status: RESOLVED | Noted: 2020-01-10 | Resolved: 2024-02-21

## 2024-03-01 ENCOUNTER — OFFICE VISIT (OUTPATIENT)
Dept: OBGYN CLINIC | Facility: MEDICAL CENTER | Age: 74
End: 2024-03-01
Payer: COMMERCIAL

## 2024-03-01 VITALS
HEIGHT: 68 IN | WEIGHT: 166 LBS | BODY MASS INDEX: 25.16 KG/M2 | DIASTOLIC BLOOD PRESSURE: 68 MMHG | SYSTOLIC BLOOD PRESSURE: 116 MMHG | HEART RATE: 78 BPM

## 2024-03-01 DIAGNOSIS — M16.12 PRIMARY OSTEOARTHRITIS OF ONE HIP, LEFT: ICD-10-CM

## 2024-03-01 DIAGNOSIS — M17.11 PRIMARY OSTEOARTHRITIS OF RIGHT KNEE: Primary | ICD-10-CM

## 2024-03-01 DIAGNOSIS — M17.12 PRIMARY OSTEOARTHRITIS OF LEFT KNEE: ICD-10-CM

## 2024-03-01 PROCEDURE — 99214 OFFICE O/P EST MOD 30 MIN: CPT | Performed by: ORTHOPAEDIC SURGERY

## 2024-03-01 NOTE — PROGRESS NOTES
Assessment:  1. Primary osteoarthritis of right knee  Injection procedure prior authorization      2. Primary osteoarthritis of left knee  Injection procedure prior authorization      3. Primary osteoarthritis of one hip, left  FL injection left hip (non arthrogram)          Plan:  Bilateral knee and left hip osteoarthritis  The patient has longstanding symptoms of bilateral knee and left hip pain  Radiograph displays severe bilateral medial knee and moderate left hip osteoarthritis  Bilateral knee knee visco-supplement was ordered as patient has on-going knee pain and stiffness in which interferes with their daily activity and sleep along with crepitus with range of motion on exam and significant osteoarthritic changes on radiograph.  The patient rates their pain at 10/10 at times. The patient has tried home exercise program learned from past physical therapy, steroid injections, activity modification, oral medications with limited benefit.  Bracing has not provided relief.   The patient has been counseled on weight loss.    The patient was provided with bilateral knee steroid injections.  The patient tolerated the procedure well.    Patient to schedule left IA hip steroid injection under imaging  Patient can consider total knee arthroplasty at any point and she may consider this duing this upcoming fall  The patient should follow up in 3 months.      To do next visit:  Return in about 3 months (around 6/1/2024) for visco-supplementation.    The above stated was discussed in layman's terms and the patient expressed understanding.  All questions were answered to the patient's satisfaction.       Scribe Attestation      I,:  Josh Jain am acting as a scribe while in the presence of the attending physician.:       I,:  Jethro Ray MD personally performed the services described in this documentation    as scribed in my presence.:               Subjective:   Anisa Rowe is a 73 y.o. female who presents for  initial evaluation of left hip and bilateral knees.  She has had symptoms for many years waxing and waning throughout.  Today she complains of left anterior groin pain and bilateral generalized knee pain.  The left hip can click on occasion.  She rates her pain at 7/10 and greater at times.  Walking and weight bearing aggravates while rest alleviates.  She does use Bromelin, Glucosamine, CBD supplements for pain yet no otc pharmaceuticals.  She denies past physical therapy, injections or surgery of these areas.        Review of systems negative unless otherwise specified in HPI    Past Medical History:   Diagnosis Date    Arthritis     stiff knees    Exercises daily     Female bladder prolapse     VEC today 11/16/2020     pelvic repair 3/2020    Hyperlipidemia     borderline    Hypoglycemia     Lyme disease     3 years ago    Migraine     not recent    Osteoporosis     borderline    Urinary incontinence     at times    Urinary urgency     Uterine prolapse     Wears dentures     bridge lower    Wears glasses        Past Surgical History:   Procedure Laterality Date    [place] CMBND ANTERPOST COLPORRAPHY W/CYSTO N/A 11/16/2020    Procedure: ANT COLPORRHAPHY;  Surgeon: Jerrell Zhu MD;  [facility];  Service: UroGynecology           [place] COLPOPEXY VAGINAL EXTRAPERITONEAL APPROACH N/A 3/9/2020    Procedure: V.E.C.(ENPLACE);  Surgeon: Jerrell Zhu MD;  [facility];  Service: UroGynecology           [place] COLPOPEXY VAGINAL EXTRAPERITONEAL APPROACH N/A 11/16/2020    Procedure: V.E.C. w/ enplace;  Surgeon: Jerrell Zhu MD;  [facility];  Service: UroGynecology           [place]Y CYSTOURETHROSCOPY N/A 3/9/2020    Procedure: CYSTOSCOPY;  Surgeon: Jerrell Zhu MD;  [facility];  Service: UroGynecology           WY CYSTOURETHROSCOPY N/A 11/16/2020    Procedure: CYSTOSCOPY;  Surgeon: Jerrell Zhu MD;  [facility];  Service: UroGynecology           [place] POST COLPORRHAPHY RECTOCELE  "W/WO PERINEORRHAPHY N/A 3/9/2020    Procedure: ANTERIOR AND POST COLPORRHAPHY;  Surgeon: Jerrell Zhu MD;  [facility];  Service: UroGynecology           [place] SLING OPERATION STRESS INCONTINENCE N/A 3/9/2020    Procedure: SINGLE INCISION SLING;  Surgeon: Jerrell Zhu MD;  [facility];  Service: UroGynecology           VAGINAL DELIVERY      \"47 yrs ago did have sedation\"    WISDOM TOOTH EXTRACTION         Family History   Problem Relation Age of Onset    Osteoporosis Mother     Stroke Father     Diabetes Father     Diabetes Sister        Social History     Occupational History    Occupation: [employer]     Comment: retired   Tobacco Use    Smoking status: Never    Smokeless tobacco: Never   Substance and Sexual Activity    Alcohol use: No    Drug use: No    Sexual activity: Not on file         Current Outpatient Medications:     acetaminophen (TYLENOL) 500 mg tablet, Take 1 tablet (500 mg total) by mouth every 6 (six) hours as needed for mild pain, Disp: 30 tablet, Rfl: 0    b complex vitamins tablet, Take 1 tablet by mouth daily, Disp: , Rfl:     Chlorphen-Phenyleph-Methscop (DEHISTINE PO), Take by mouth Dehist herbal for allergies, Disp: , Rfl:     cholecalciferol (VITAMIN D3) 1,000 units tablet, Take 1,000 Units by mouth daily, Disp: , Rfl:     dicyclomine (BENTYL) 20 mg tablet, Take 1 tablet (20 mg total) by mouth 2 (two) times a day as needed (Abdominal pain), Disp: 20 tablet, Rfl: 0    docusate sodium (COLACE) 100 mg capsule, Take 1 capsule (100 mg total) by mouth 2 (two) times a day, Disp: 10 capsule, Rfl: 0    ibuprofen (MOTRIN) 600 mg tablet, Take 1 tablet (600 mg total) by mouth every 6 (six) hours as needed for mild pain, Disp: 30 tablet, Rfl: 0    Omega-3 Fatty Acids (OMEGA-3 FISH OIL PO), Take by mouth, Disp: , Rfl:     ondansetron (ZOFRAN-ODT) 4 mg disintegrating tablet, Take 1 tablet (4 mg total) by mouth every 8 (eight) hours as needed for nausea or vomiting, Disp: 10 tablet, " "Rfl: 0    Allergies   Allergen Reactions    Other Nausea Only and Headache     Chemicals and perfumes    Wheat Bran - Food Allergy Diarrhea     \"just wheat\"            Vitals:    03/01/24 0859   BP: 116/68   Pulse: 78       Objective:  Physical exam  General: Awake, Alert, Oriented  Eyes: Pupils equal, round and reactive to light  Heart: regular rate and rhythm  Lungs: No audible wheezing  Abdomen: soft                    Ortho Exam  Left knee:  Varus alignment with significant medial bony enlargement  TTP over medial joint line  No erythema or ecchymosis  No effusion or swelling  Normal strength  Good ROM with crepitus   Calf compartments soft and supple  Sensation intact  Toes are warm sensate and mobile    Right knee:  Varus alignment  No erythema or ecchymosis  No effusion or swelling  Normal strength  Good ROM with crepitus   Calf compartments soft and supple  Sensation intact  Toes are warm sensate and mobile    Left hip:  Apprehension with ROM  Groin pain with IR  ER with flexion  Calf compartments soft and supple  Sensation intact  Toes are warm sensate and mobile      Right hip:  Good arc of motion with no pain    Diagnostics, reviewed and taken today if performed as documented:    The attending physician has personally reviewed the pertinent films in PACS and interpretation is as follows:  Bilateral knee x-rays of 12/2023:  Each knee displays severe medial and patellofemoral and moderate lateral compartment arthritic changes.  Left knee has large osteophyte medially yet each compartment displays subchondral sclerosis along with smaller osteophytes.      Bilateral hip x-rays of 9/2022:  Mild-moderate arthritic changes bilaterally with decreased joint space and small osteophytes.      Procedures, if performed today:    Procedures    None performed      Portions of the record may have been created with voice recognition software.  Occasional wrong word or \"sound a like\" substitutions may have occurred due to " the inherent limitations of voice recognition software.  Read the chart carefully and recognize, using context, where substitutions have occurred.

## 2024-03-05 ENCOUNTER — TELEPHONE (OUTPATIENT)
Age: 74
End: 2024-03-05

## 2024-03-05 NOTE — TELEPHONE ENCOUNTER
Caller:patient    Doctor: debbi     Reason for call: patient spoke wit provider about hip replacement in fall time but patient would like to do it now    Call back#: [phone]

## 2024-03-11 ENCOUNTER — HOSPITAL ENCOUNTER (OUTPATIENT)
Dept: RADIOLOGY | Facility: HOSPITAL | Age: 74
Discharge: HOME | End: 2024-03-11
Attending: ORTHOPAEDIC SURGERY

## 2024-03-15 ENCOUNTER — OFFICE VISIT (OUTPATIENT)
Dept: OBGYN CLINIC | Facility: MEDICAL CENTER | Age: 74
End: 2024-03-15
Payer: COMMERCIAL

## 2024-03-15 VITALS
SYSTOLIC BLOOD PRESSURE: 143 MMHG | DIASTOLIC BLOOD PRESSURE: 78 MMHG | WEIGHT: 166 LBS | HEIGHT: 68 IN | HEART RATE: 77 BPM | BODY MASS INDEX: 25.16 KG/M2

## 2024-03-15 DIAGNOSIS — M16.12 PRIMARY OSTEOARTHRITIS OF ONE HIP, LEFT: ICD-10-CM

## 2024-03-15 DIAGNOSIS — M17.11 PRIMARY OSTEOARTHRITIS OF RIGHT KNEE: Primary | ICD-10-CM

## 2024-03-15 DIAGNOSIS — M17.12 PRIMARY OSTEOARTHRITIS OF LEFT KNEE: ICD-10-CM

## 2024-03-15 PROCEDURE — 99213 OFFICE O/P EST LOW 20 MIN: CPT | Performed by: ORTHOPAEDIC SURGERY

## 2024-03-15 NOTE — PROGRESS NOTES
Assessment:  1. Primary osteoarthritis of right knee        2. Primary osteoarthritis of left knee        3. Primary osteoarthritis of one hip, left            Plan:  Bilateral knee and left hip osteoarthritis   The patient has longstanding symptoms of bilateral knee and left hip pain   Visco-supplement had been ordered to be administered in 6/2024 timeframe  We discussed osteoporosis, left total hip arthroplasty and use of IA hip injections.    A plan for patient to have left IA hip injection to hopefully provide relief during gardening season  Patient will return in 6/2024 for bilateral knee visco-supplement  Consider total hip arthroplasty if IA injection does not provide meaningful relief.      To do next visit:  Return for visco-supplementation.    The above stated was discussed in layman's terms and the patient expressed understanding.  All questions were answered to the patient's satisfaction.       Scribe Attestation      I,:  Josh Jain am acting as a scribe while in the presence of the attending physician.:       I,:  Jethro Ray MD personally performed the services described in this documentation    as scribed in my presence.:               Subjective:   Anisa Rowe is a 73 y.o. female who presents for follow of bilateral knees and left hip.  At last visit 3/1/2024 we ordered bilateral knee visco-supplement to be administer in 3 months along with left hip IA steroid injection that was not scheduled at this time.  Today she complains of left anterior groin pain and bilateral generalized knee pain. The left hip can click on occasion. She rates her pain at 7/10 and greater at times. Walking and weight bearing aggravates while rest alleviates. She does use Bromelin, Glucosamine, CBD supplements for pain yet no otc pharmaceuticals.  She has had bilateral knee steroid injections with limited benefit.  She denies past physical therapy or surgery of these areas.       Review of systems negative  "unless otherwise specified in HPI    Past Medical History:   Diagnosis Date    Arthritis     stiff knees    Exercises daily     Female bladder prolapse     VEC today 11/16/2020     pelvic repair 3/2020    Hyperlipidemia     borderline    Hypoglycemia     Lyme disease     3 years ago    Migraine     not recent    Osteoporosis     borderline    Urinary incontinence     at times    Urinary urgency     Uterine prolapse     Wears dentures     bridge lower    Wears glasses        Past Surgical History:   Procedure Laterality Date    NM CMBND ANTERPOST COLPORRAPHY W/CYSTO N/A 11/16/2020    Procedure: ANT COLPORRHAPHY;  Surgeon: Jerrell Zhu MD;  [facility];  Service: UroGynecology           NM COLPOPEXY VAGINAL EXTRAPERITONEAL APPROACH N/A 3/9/2020    Procedure: V.E.C.(ENPLACE);  Surgeon: Jerrell Zhu MD;  [facility];  Service: UroGynecology           NM COLPOPEXY VAGINAL EXTRAPERITONEAL APPROACH N/A 11/16/2020    Procedure: V.E.C. w/ enplace;  Surgeon: Jerrell Zhu MD;  [facility];  Service: UroGynecology           NM CYSTOURETHROSCOPY N/A 3/9/2020    Procedure: CYSTOSCOPY;  Surgeon: Jerrell Zhu MD;  [facility];  Service: UroGynecology           NM CYSTOURETHROSCOPY N/A 11/16/2020    Procedure: CYSTOSCOPY;  Surgeon: Jerrell Zhu MD;  [facility];  Service: UroGynecology           NM POST COLPORRHAPHY RECTOCELE W/WO PERINEORRHAPHY N/A 3/9/2020    Procedure: ANTERIOR AND POST COLPORRHAPHY;  Surgeon: Jerrell Zhu MD;  [facility];  Service: UroGynecology           NM SLING OPERATION STRESS INCONTINENCE N/A 3/9/2020    Procedure: SINGLE INCISION SLING;  Surgeon: Jerrell Zhu MD;  [facility];  Service: UroGynecology           VAGINAL DELIVERY      \"47 yrs ago did have sedation\"    WISDOM TOOTH EXTRACTION         Family History   Problem Relation Age of Onset    Osteoporosis Mother     Stroke Father     Diabetes Father     Diabetes " "Sister        Social History     Occupational History    Occupation:      Comment: retired   Tobacco Use    Smoking status: Never    Smokeless tobacco: Never   Substance and Sexual Activity    Alcohol use: No    Drug use: No    Sexual activity: Not on file         Current Outpatient Medications:     acetaminophen (TYLENOL) 500 mg tablet, Take 1 tablet (500 mg total) by mouth every 6 (six) hours as needed for mild pain, Disp: 30 tablet, Rfl: 0    Ascorbic Acid, Vitamin C, (VITAMIN C) 100 MG tablet, Take 100 mg by mouth daily, Disp: , Rfl:     dicyclomine (BENTYL) 20 mg tablet, Take 1 tablet (20 mg total) by mouth 2 (two) times a day as needed (Abdominal pain), Disp: 20 tablet, Rfl: 0    docusate sodium (COLACE) 100 mg capsule, Take 1 capsule (100 mg total) by mouth 2 (two) times a day, Disp: 10 capsule, Rfl: 0    ibuprofen (MOTRIN) 600 mg tablet, Take 1 tablet (600 mg total) by mouth every 6 (six) hours as needed for mild pain, Disp: 30 tablet, Rfl: 0    ondansetron (ZOFRAN-ODT) 4 mg disintegrating tablet, Take 1 tablet (4 mg total) by mouth every 8 (eight) hours as needed for nausea or vomiting, Disp: 10 tablet, Rfl: 0    b complex vitamins tablet, Take 1 tablet by mouth daily (Patient not taking: Reported on 3/15/2024), Disp: , Rfl:     Chlorphen-Phenyleph-Methscop (DEHISTINE PO), Take by mouth Dehist herbal for allergies (Patient not taking: Reported on 3/15/2024), Disp: , Rfl:     cholecalciferol (VITAMIN D3) 1,000 units tablet, Take 1,000 Units by mouth daily (Patient not taking: Reported on 3/15/2024), Disp: , Rfl:     Omega-3 Fatty Acids (OMEGA-3 FISH OIL PO), Take by mouth (Patient not taking: Reported on 3/15/2024), Disp: , Rfl:     Allergies   Allergen Reactions    Other Nausea Only and Headache     Chemicals and perfumes    Wheat Bran - Food Allergy Diarrhea     \"just wheat\"            Vitals:    03/15/24 1446   BP: 143/78   Pulse: 77       Objective:  Physical exam  General: Awake, Alert, " "Oriented  Eyes: Pupils equal, round and reactive to light  Heart: regular rate and rhythm  Lungs: No audible wheezing  Abdomen: soft                    Ortho Exam  Restriction of left hip mobility is identified and this recreates pain in the left groin.  Left knee is not effused.  There is bony enlargement tenderness medially.  There is crepitation flexion-extension    Diagnostics, reviewed and taken today if performed as documented:    None performed none performed    The attending physician has personally reviewed the pertinent films in PACS and interpretation is as follows:  Review of previous x-rays shows left hip arthritis of moderate nature, and left knee arthritis of advanced nature    Procedures, if performed today:    Procedures    None performed      Portions of the record may have been created with voice recognition software.  Occasional wrong word or \"sound a like\" substitutions may have occurred due to the inherent limitations of voice recognition software.  Read the chart carefully and recognize, using context, where substitutions have occurred.    "

## 2024-03-22 ENCOUNTER — OFFICE VISIT (OUTPATIENT)
Dept: OBGYN CLINIC | Facility: MEDICAL CENTER | Age: 74
End: 2024-03-22
Payer: COMMERCIAL

## 2024-03-22 ENCOUNTER — HOSPITAL ENCOUNTER (OUTPATIENT)
Dept: RADIOLOGY | Facility: HOSPITAL | Age: 74
Discharge: HOME | End: 2024-03-22
Attending: ORTHOPAEDIC SURGERY

## 2024-03-22 VITALS
BODY MASS INDEX: 25.16 KG/M2 | DIASTOLIC BLOOD PRESSURE: 72 MMHG | HEART RATE: 73 BPM | WEIGHT: 166 LBS | SYSTOLIC BLOOD PRESSURE: 125 MMHG | HEIGHT: 68 IN

## 2024-03-22 DIAGNOSIS — M16.12 PRIMARY OSTEOARTHRITIS OF ONE HIP, LEFT: Primary | ICD-10-CM

## 2024-03-22 DIAGNOSIS — Z79.01 ANTICOAGULATION MANAGEMENT ENCOUNTER: ICD-10-CM

## 2024-03-22 DIAGNOSIS — Z51.81 ANTICOAGULATION MANAGEMENT ENCOUNTER: ICD-10-CM

## 2024-03-22 DIAGNOSIS — Z01.818 PRE-OP TESTING: Primary | ICD-10-CM

## 2024-03-22 PROCEDURE — 99214 OFFICE O/P EST MOD 30 MIN: CPT | Performed by: ORTHOPAEDIC SURGERY

## 2024-03-22 RX ORDER — FERROUS SULFATE 324(65)MG
324 TABLET, DELAYED RELEASE (ENTERIC COATED) ORAL
Qty: 30 TABLET | Refills: 2 | Status: SHIPPED | OUTPATIENT
Start: 2024-03-22

## 2024-03-22 RX ORDER — ACETAMINOPHEN 325 MG/1
975 TABLET ORAL ONCE
OUTPATIENT
Start: 2024-03-22 | End: 2024-03-22

## 2024-03-22 RX ORDER — MULTIVIT-MIN/IRON FUM/FOLIC AC 7.5 MG-4
1 TABLET ORAL DAILY
Qty: 30 TABLET | Refills: 2 | Status: SHIPPED | OUTPATIENT
Start: 2024-03-22

## 2024-03-22 RX ORDER — ASCORBIC ACID 500 MG
500 TABLET ORAL 2 TIMES DAILY
Qty: 60 TABLET | Refills: 2 | Status: SHIPPED | OUTPATIENT
Start: 2024-03-22

## 2024-03-22 RX ORDER — TRANEXAMIC ACID 10 MG/ML
1000 INJECTION, SOLUTION INTRAVENOUS ONCE
OUTPATIENT
Start: 2024-03-22 | End: 2024-03-22

## 2024-03-22 RX ORDER — ENOXAPARIN SODIUM 100 MG/ML
40 INJECTION SUBCUTANEOUS DAILY
Qty: 11.2 ML | Refills: 0 | Status: SHIPPED | OUTPATIENT
Start: 2024-03-22 | End: 2024-04-19

## 2024-03-22 RX ORDER — FOLIC ACID 1 MG/1
1 TABLET ORAL DAILY
Qty: 30 TABLET | Refills: 2 | Status: SHIPPED | OUTPATIENT
Start: 2024-03-22

## 2024-03-22 RX ORDER — GABAPENTIN 300 MG/1
300 CAPSULE ORAL ONCE
OUTPATIENT
Start: 2024-03-22 | End: 2024-03-22

## 2024-03-22 RX ORDER — CEFAZOLIN SODIUM 2 G/50ML
2000 SOLUTION INTRAVENOUS ONCE
OUTPATIENT
Start: 2024-03-22 | End: 2024-03-22

## 2024-03-22 RX ORDER — SODIUM CHLORIDE, SODIUM LACTATE, POTASSIUM CHLORIDE, CALCIUM CHLORIDE 600; 310; 30; 20 MG/100ML; MG/100ML; MG/100ML; MG/100ML
125 INJECTION, SOLUTION INTRAVENOUS CONTINUOUS
OUTPATIENT
Start: 2024-03-22

## 2024-03-22 RX ORDER — CHLORHEXIDINE GLUCONATE ORAL RINSE 1.2 MG/ML
15 SOLUTION DENTAL ONCE
OUTPATIENT
Start: 2024-03-22 | End: 2024-03-22

## 2024-03-22 NOTE — PROGRESS NOTES
Assessment:   Diagnosis ICD-10-CM Associated Orders   1. Primary osteoarthritis of one hip, left  M16.12 Case request operating room: ARTHROPLASTY HIP TOTAL     Notify physician     Diet NPO; Sips with meds     Nursing Communication Warmimg Interventions Implemented     Nursing Communication CHG bath, have staff wash entire body (neck down) per pre-op bathing protocol. Routine, evening prior to, and day of surgery.     Nursing Communication Swab both nares with Povidone-Iodine solution, EXCLUDE if patient has shellfish/Iodine allergy, and replace with nasal alcohol swabstick. Routine, day of surgery, on call to OR     chlorhexidine (PERIDEX) 0.12 % oral rinse 15 mL     Void on call to OR     Insert peripheral IV     lactated ringers infusion     acetaminophen (TYLENOL) tablet 975 mg     gabapentin (NEURONTIN) capsule 300 mg     tranexamic acid (CYKLOKAPRON) 1000-0.7 MG/100ML-% injection 1,000 mg     ceFAZolin (ANCEF) IVPB (premix in dextrose) 2,000 mg 50 mL     ascorbic acid (VITAMIN C) 500 MG tablet     ferrous sulfate 324 (65 Fe) mg     folic acid (FOLVITE) 1 mg tablet     Multiple Vitamins-Minerals (multivitamin with minerals) tablet     Comprehensive metabolic panel     Hemoglobin A1C W/EAG Estimation     CBC and differential     Anemia Panel w/Reflex     Protime-INR     APTT     Type and screen     Ambulatory referral to Family Practice     Ambulatory referral to Physical Therapy     Place sequential compression device     enoxaparin (LOVENOX) 40 mg/0.4 mL     Case request operating room: ARTHROPLASTY HIP TOTAL      2. Anticoagulation management encounter  Z51.81 CBC and differential    Z79.01 Protime-INR     APTT          Plan:  73 year old female with left hip osteoarthritis   Wishes to pursue left total hip arthroplasty   Meet with surgery coordinator today   Pre-op vitamins and post-op lovenox sent to pharmacy today   Follow up after surgery      Diagnostics reviewed and physical exam performed.  Diagnosis,  treatment options and associated risks were discussed with the patient including no treatment, nonsurgical treatment and potential for surgical intervention.  The patient was given the opportunity to ask questions regarding each.        To do next visit:  Follow up post-op     The above stated was discussed in layman's terms and the patient expressed understanding.  All questions were answered to the patient's satisfaction.         Subjective:   Anisa Rowe is a 73 y.o. female who presents to discuss left total hip arthroplasty.  Patient seen last week and originally scheduled to have left IA hip steroid injection today, however she presents to discuss scheduling replacement.  Patient describes severe and unrelenting pain of the left hip that has greatly impacted her activities of daily living.  She continues to describe left anterior groin pain at this time.      Discussed with patient the risks and benefits of operative intervention. Risk of the surgery are inclusive of but not limited to bleeding, infection, nerve injury, blood clot, worsening of symptoms, not achieving the anticipated results, persistent stiffness, weakness and the need for additional surgery. The patient verbally stated they understood those risks and would like to proceed with the surgery. Consent signed in office today.   Pain score 9/10     Review of systems negative unless otherwise specified in HPI    Past Medical History:   Diagnosis Date    Arthritis     stiff knees    Exercises daily     Female bladder prolapse     VEC today 11/16/2020     pelvic repair 3/2020    Hyperlipidemia     borderline    Hypoglycemia     Lyme disease     3 years ago    Migraine     not recent    Osteoporosis     borderline    Urinary incontinence     at times    Urinary urgency     Uterine prolapse     Wears dentures     bridge lower    Wears glasses        Past Surgical History:   Procedure Laterality Date    SD CMBND ANTERPOST COLPORRAPHY W/CYSTO N/A  "11/16/2020    Procedure: ANT COLPORRHAPHY;  Surgeon: Jerrell Zhu MD;  [facility];  Service: UroGynecology           [place] COLPOPEXY VAGINAL EXTRAPERITONEAL APPROACH N/A 3/9/2020    Procedure: V.E.C.(ENPLACE);  Surgeon: Jerrell Zhu MD;  [facility];  Service: UroGynecology           [place] COLPOPEXY VAGINAL EXTRAPERITONEAL APPROACH N/A 11/16/2020    Procedure: V.E.C. w/ enplace;  Surgeon: Jerrell Zhu MD;  [facility];  Service: UroGynecology           MA CYSTOURETHROSCOPY N/A 3/9/2020    Procedure: CYSTOSCOPY;  Surgeon: Jerrell Zhu MD;  [facility];  Service: UroGynecology           MA CYSTOURETHROSCOPY N/A 11/16/2020    Procedure: CYSTOSCOPY;  Surgeon: Jerrell Zhu MD;  [facility];  Service: UroGynecology           [place] POST COLPORRHAPHY RECTOCELE W/WO PERINEORRHAPHY N/A 3/9/2020    Procedure: ANTERIOR AND POST COLPORRHAPHY;  Surgeon: Jerrell Zhu MD;  [facility];  Service: UroGynecology           [place] SLING OPERATION STRESS INCONTINENCE N/A 3/9/2020    Procedure: SINGLE INCISION SLING;  Surgeon: Jerrell Zhu MD;  [facility];  Service: UroGynecology           VAGINAL DELIVERY      \"47 yrs ago did have sedation\"    WISDOM TOOTH EXTRACTION         Family History   Problem Relation Age of Onset    Osteoporosis Mother     Stroke Father     Diabetes Father     Diabetes Sister        Social History     Occupational History    Occupation: [employer]     Comment: retired   Tobacco Use    Smoking status: Never    Smokeless tobacco: Never   Substance and Sexual Activity    Alcohol use: No    Drug use: No    Sexual activity: Not on file         Current Outpatient Medications:     acetaminophen (TYLENOL) 500 mg tablet, Take 1 tablet (500 mg total) by mouth every 6 (six) hours as needed for mild pain, Disp: 30 tablet, Rfl: 0    ascorbic acid (VITAMIN C) 500 MG tablet, Take 1 tablet (500 mg total) by mouth 2 (two) times a day, Disp: 60 tablet, " "Rfl: 2    Ascorbic Acid, Vitamin C, (VITAMIN C) 100 MG tablet, Take 100 mg by mouth daily, Disp: , Rfl:     dicyclomine (BENTYL) 20 mg tablet, Take 1 tablet (20 mg total) by mouth 2 (two) times a day as needed (Abdominal pain), Disp: 20 tablet, Rfl: 0    docusate sodium (COLACE) 100 mg capsule, Take 1 capsule (100 mg total) by mouth 2 (two) times a day, Disp: 10 capsule, Rfl: 0    enoxaparin (LOVENOX) 40 mg/0.4 mL, Inject 0.4 mL (40 mg total) under the skin daily for 28 days To start Post-Op, Disp: 11.2 mL, Rfl: 0    ferrous sulfate 324 (65 Fe) mg, Take 1 tablet (324 mg total) by mouth daily before breakfast, Disp: 30 tablet, Rfl: 2    folic acid (FOLVITE) 1 mg tablet, Take 1 tablet (1 mg total) by mouth daily, Disp: 30 tablet, Rfl: 2    ibuprofen (MOTRIN) 600 mg tablet, Take 1 tablet (600 mg total) by mouth every 6 (six) hours as needed for mild pain, Disp: 30 tablet, Rfl: 0    Multiple Vitamins-Minerals (multivitamin with minerals) tablet, Take 1 tablet by mouth daily, Disp: 30 tablet, Rfl: 2    ondansetron (ZOFRAN-ODT) 4 mg disintegrating tablet, Take 1 tablet (4 mg total) by mouth every 8 (eight) hours as needed for nausea or vomiting, Disp: 10 tablet, Rfl: 0    b complex vitamins tablet, Take 1 tablet by mouth daily (Patient not taking: Reported on 3/15/2024), Disp: , Rfl:     Chlorphen-Phenyleph-Methscop (DEHISTINE PO), Take by mouth Dehist herbal for allergies (Patient not taking: Reported on 3/15/2024), Disp: , Rfl:     cholecalciferol (VITAMIN D3) 1,000 units tablet, Take 1,000 Units by mouth daily (Patient not taking: Reported on 3/15/2024), Disp: , Rfl:     Omega-3 Fatty Acids (OMEGA-3 FISH OIL PO), Take by mouth (Patient not taking: Reported on 3/15/2024), Disp: , Rfl:     Allergies   Allergen Reactions    Other Nausea Only and Headache     Chemicals and perfumes    Wheat Bran - Food Allergy Diarrhea     \"just wheat\"            Vitals:    03/22/24 1111   BP: 125/72   Pulse: 73       Objective:  Physical " "exam  General: Awake, Alert, Oriented  Eyes: Pupils equal, round and reactive to light  Heart: regular rate and rhythm  Lungs: No audible wheezing  Abdomen: soft                    Ortho Exam  Left hip:   Restriction of mobility  Passive ROM recreates pain in groin   Minimal internal and external rotation     Diagnostics, reviewed and taken today if performed as documented:    Left hip x-ray:   - moderate-severe arthritis   - multiple osteophytes and subchondral sclerosis noted     Procedures, if performed today:    None performed      Portions of the record may have been created with voice recognition software.  Occasional wrong word or \"sound a like\" substitutions may have occurred due to the inherent limitations of voice recognition software.  Read the chart carefully and recognize, using context, where substitutions have occurred.      "

## 2024-04-04 ENCOUNTER — TELEPHONE (OUTPATIENT)
Dept: OBGYN CLINIC | Facility: HOSPITAL | Age: 74
End: 2024-04-04

## 2024-04-04 NOTE — TELEPHONE ENCOUNTER
Preoperative Elective Admission Assessment    Living Situation:    Who does pt live with: spouse  What kind of home: multi-level  How do they enter the home: front  How many levels in home: 2   # of steps to enter home: 3-4 w/ handrail  # of steps to second floor: 12  Are there handrails: Yes  Are there landings: No  Sleeping arrangement: first/entry floor  Where is Bathroom: entry level full bath  Where is the tub or shower: Step in bath tub w/ grab bar  Dogs or ther pets: n/a     First Floor Setup:   Is there a bathroom: Yes  Where would pt sleep: bed     DME: rolling walker  We discussed clearing pathways in the home and making sure there is accessibly to use the walker, for example, removing throw rugs.      Patient's Current Level of Function: Ambulates: Independently and Independent w ADLs    Post-op Caregiver: spouse  Caregiver Name and phone number for Inpatient discharge needs: Freddy  Currently receive any HHC/aides/community supports: No     Post-op Transport: relative  To/from hospital: relative  To/from PT 2-3x/week: relative  Uses community transport now: No     Outpatient Physical Therapy Site:  Site: TBD  pre and post-op appts scheduled? No     Medication Management: self and out of bottle  Preferred Pharmacy for Post-op Medications: [facility]  Blood Management Vitamin Regimen: Pt confirms taking as prescribed  Post-op anticoagulant: to be determined by surgical team postoperatively     DC Plan: Pt plans to be discharged home      Barriers to DC identified preoperatively: none identified    BMI: 25.24    Patient Education:  Pt educated on post-op pain, early mobilization (POD0), LOS goals, OP PT goals, and preoperative bathing. Patient educated that our goal is to appropriately discharge patient based off their post-op function while striving to maintain maximal independence. The goal is to discharge patient to home and for them to attend outpatient physical therapy.    Assigned to care team? Yes

## 2024-04-08 NOTE — PRE-PROCEDURE INSTRUCTIONS
Pre-Surgery Instructions:   Medication Instructions    acetaminophen (TYLENOL) 500 mg tablet Uses PRN- OK to take day of surgery    ALPHA LIPOIC ACID PO Stop taking 7 days prior to surgery.    ascorbic acid (VITAMIN C) 500 MG tablet Instructions provided by MD    Bromelains (BROMELAIN PO) Stop taking 7 days prior to surgery.    cholecalciferol (VITAMIN D3) 1,000 units tablet Stop taking 7 days prior to surgery.    Coenzyme Q10 (COQ10 PO) Stop taking 7 days prior to surgery.    ferrous sulfate 324 (65 Fe) mg Instructions provided by MD    folic acid (FOLVITE) 1 mg tablet Instructions provided by MD    ibuprofen (MOTRIN) 600 mg tablet Stop taking 7 days prior to surgery.    Multiple Vitamins-Minerals (multivitamin with minerals) tablet Instructions provided by MD    QUERCETIN PO Stop taking 7 days prior to surgery.   Pt instructed to bring walker on day of surgery. Also call Surgical office with any questions R/T ortho class.   Medication instructions for day surgery reviewed. Please use only a sip of water to take your instructed medications. Avoid all over the counter vitamins, supplements and NSAIDS for one week prior to surgery per anesthesia guidelines. Tylenol is ok to take as needed.     You will receive a call one business day prior to surgery with an arrival time and hospital directions. If your surgery is scheduled on a Monday, the hospital will be calling you on the Friday prior to your surgery. If you have not heard from anyone by 8pm, please call the hospital supervisor through the hospital  at [phone]. ([place] [phone] or [place] [phone]).    Do not eat or drink anything after midnight the night before your surgery, including candy, mints, lifesavers, or chewing gum. Do not drink alcohol 24hrs before your surgery. Try not to smoke at least 24hrs before your surgery.       Follow the pre surgery showering instructions as listed in the “My Surgical Experience Booklet” or otherwise  provided by your surgeon's office. Do not use a blade to shave the surgical area 1 week before surgery. It is okay to use a clean electric clippers up to 24 hours before surgery. Do not apply any lotions, creams, including makeup, cologne, deodorant, or perfumes after showering on the day of your surgery. Do not use dry shampoo, hair spray, hair gel, or any type of hair products.     No contact lenses, eye make-up, or artificial eyelashes. Remove nail polish, including gel polish, and any artificial, gel, or acrylic nails if possible. Remove all jewelry including rings and body piercing jewelry.     Wear causal clothing that is easy to take on and off. Consider your type of surgery.    Keep any valuables, jewelry, piercings at home. Please bring any specially ordered equipment (sling, braces) if indicated.    Arrange for a responsible person to drive you to and from the hospital on the day of your surgery. Please confirm the visitor policy for the day of your procedure when you receive your phone call with an arrival time.     Call the surgeon's office with any new illnesses, exposures, or additional questions prior to surgery.    Please reference your “My Surgical Experience Booklet” for additional information to prepare for your upcoming surgery.      See Geriatric Assessment below...  Cognitive Assessment:   CAM:   TUG <15 sec:  Falls (last 6 months): 0  Hand  score:  -Attempt 1:  -Attempt 2:  -Attempt 3:  Sha Total Score: 23  PHQ- 9 Depression Scale:1  Nutrition Assessment Score:14  METS:6.61  Incentive Spirometry Level:   Health goals:  -What are your overall health goals? (quit smoking, wt. loss, rest, decrease stress)  Being more active-being able to work in garden.  -What brings you strength? (family, friends, Orthodoxy, health)  Family and [place]  -What activities are important to you? (exercise, reading, travel, work)             Gardening, hiking and being outside.

## 2024-04-10 ENCOUNTER — APPOINTMENT (OUTPATIENT)
Dept: LAB | Facility: HOSPITAL | Age: 74
End: 2024-04-10

## 2024-04-10 ENCOUNTER — OFFICE VISIT (OUTPATIENT)
Dept: LAB | Facility: HOSPITAL | Age: 74
End: 2024-04-10
Payer: COMMERCIAL

## 2024-04-10 ENCOUNTER — HOSPITAL ENCOUNTER (OUTPATIENT)
Dept: RADIOLOGY | Facility: HOSPITAL | Age: 74
Discharge: HOME | End: 2024-04-10

## 2024-04-10 DIAGNOSIS — M25.562 LEFT KNEE PAIN, UNSPECIFIED CHRONICITY: ICD-10-CM

## 2024-04-10 DIAGNOSIS — Z79.01 ANTICOAGULATION MANAGEMENT ENCOUNTER: ICD-10-CM

## 2024-04-10 DIAGNOSIS — Z01.818 PRE-OP EVALUATION: ICD-10-CM

## 2024-04-10 DIAGNOSIS — Z01.818 PRE-OP TESTING: ICD-10-CM

## 2024-04-10 DIAGNOSIS — Z51.81 ANTICOAGULATION MANAGEMENT ENCOUNTER: ICD-10-CM

## 2024-04-10 DIAGNOSIS — M16.12 PRIMARY OSTEOARTHRITIS OF ONE HIP, LEFT: Primary | ICD-10-CM

## 2024-04-10 LAB
ALBUMIN SERPL BCP-MCNC: 4.1 G/DL (ref 3.5–5)
ALP SERPL-CCNC: 84 U/L (ref 34–104)
ALT SERPL W P-5'-P-CCNC: 16 U/L (ref 7–52)
ANION GAP SERPL CALCULATED.3IONS-SCNC: 6 MMOL/L (ref 4–13)
APTT PPP: 34 SECONDS (ref 23–37)
AST SERPL W P-5'-P-CCNC: 18 U/L (ref 13–39)
ATRIAL RATE: 78 BPM
BASOPHILS # BLD AUTO: 0.04 THOUSANDS/ÂΜL (ref 0–0.1)
BASOPHILS NFR BLD AUTO: 1 % (ref 0–1)
BILIRUB SERPL-MCNC: 1 MG/DL (ref 0.2–1)
BUN SERPL-MCNC: 15 MG/DL (ref 5–25)
CALCIUM SERPL-MCNC: 9.6 MG/DL (ref 8.4–10.2)
CHLORIDE SERPL-SCNC: 106 MMOL/L (ref 96–108)
CO2 SERPL-SCNC: 28 MMOL/L (ref 21–32)
CREAT SERPL-MCNC: 0.65 MG/DL (ref 0.6–1.3)
EOSINOPHIL # BLD AUTO: 0.19 THOUSAND/ÂΜL (ref 0–0.61)
EOSINOPHIL NFR BLD AUTO: 3 % (ref 0–6)
ERYTHROCYTE [DISTWIDTH] IN BLOOD BY AUTOMATED COUNT: 12.7 % (ref 11.6–15.1)
EST. AVERAGE GLUCOSE BLD GHB EST-MCNC: 108 MG/DL
GFR SERPL CREATININE-BSD FRML MDRD: 87 ML/MIN/1.73SQ M
GLUCOSE SERPL-MCNC: 113 MG/DL (ref 65–140)
HBA1C MFR BLD: 5.4 %
HCT VFR BLD AUTO: 42.3 % (ref 34.8–46.1)
HGB BLD-MCNC: 14.5 G/DL (ref 11.5–15.4)
IMM GRANULOCYTES # BLD AUTO: 0 THOUSAND/UL (ref 0–0.2)
IMM GRANULOCYTES NFR BLD AUTO: 0 % (ref 0–2)
INR PPP: 0.99 (ref 0.84–1.19)
LYMPHOCYTES # BLD AUTO: 2.11 THOUSANDS/ÂΜL (ref 0.6–4.47)
LYMPHOCYTES NFR BLD AUTO: 37 % (ref 14–44)
MCH RBC QN AUTO: 30.4 PG (ref 26.8–34.3)
MCHC RBC AUTO-ENTMCNC: 34.3 G/DL (ref 31.4–37.4)
MCV RBC AUTO: 89 FL (ref 82–98)
MONOCYTES # BLD AUTO: 0.61 THOUSAND/ÂΜL (ref 0.17–1.22)
MONOCYTES NFR BLD AUTO: 11 % (ref 4–12)
NEUTROPHILS # BLD AUTO: 2.8 THOUSANDS/ÂΜL (ref 1.85–7.62)
NEUTS SEG NFR BLD AUTO: 48 % (ref 43–75)
NRBC BLD AUTO-RTO: 0 /100 WBCS
P AXIS: 26 DEGREES
PLATELET # BLD AUTO: 229 THOUSANDS/UL (ref 149–390)
PMV BLD AUTO: 8.6 FL (ref 8.9–12.7)
POTASSIUM SERPL-SCNC: 4.2 MMOL/L (ref 3.5–5.3)
PR INTERVAL: 176 MS
PROT SERPL-MCNC: 6.7 G/DL (ref 6.4–8.4)
PROTHROMBIN TIME: 13.7 SECONDS (ref 11.6–14.5)
QRS AXIS: 12 DEGREES
QRSD INTERVAL: 76 MS
QT INTERVAL: 384 MS
QTC INTERVAL: 437 MS
RBC # BLD AUTO: 4.77 MILLION/UL (ref 3.81–5.12)
SODIUM SERPL-SCNC: 140 MMOL/L (ref 135–147)
T WAVE AXIS: 45 DEGREES
VENTRICULAR RATE: 78 BPM
WBC # BLD AUTO: 5.75 THOUSAND/UL (ref 4.31–10.16)

## 2024-04-10 PROCEDURE — 85730 THROMBOPLASTIN TIME PARTIAL: CPT

## 2024-04-10 PROCEDURE — 85610 PROTHROMBIN TIME: CPT

## 2024-04-10 PROCEDURE — 83036 HEMOGLOBIN GLYCOSYLATED A1C: CPT

## 2024-04-10 PROCEDURE — 80053 COMPREHEN METABOLIC PANEL: CPT

## 2024-04-10 PROCEDURE — 36415 COLL VENOUS BLD VENIPUNCTURE: CPT

## 2024-04-10 PROCEDURE — 86850 RBC ANTIBODY SCREEN: CPT

## 2024-04-10 PROCEDURE — 85025 COMPLETE CBC W/AUTO DIFF WBC: CPT

## 2024-04-10 PROCEDURE — 93010 ELECTROCARDIOGRAM REPORT: CPT | Performed by: INTERNAL MEDICINE

## 2024-04-10 PROCEDURE — 93005 ELECTROCARDIOGRAM TRACING: CPT

## 2024-04-10 PROCEDURE — 86900 BLOOD TYPING SEROLOGIC ABO: CPT

## 2024-04-10 PROCEDURE — 86901 BLOOD TYPING SEROLOGIC RH(D): CPT

## 2024-04-11 ENCOUNTER — LAB REQUISITION (OUTPATIENT)
Dept: LAB | Facility: HOSPITAL | Age: 74
End: 2024-04-11
Payer: COMMERCIAL

## 2024-04-11 DIAGNOSIS — Z01.818 ENCOUNTER FOR OTHER PREPROCEDURAL EXAMINATION: ICD-10-CM

## 2024-04-11 LAB
ABO GROUP BLD: NORMAL
BLD GP AB SCN SERPL QL: NEGATIVE
RH BLD: NEGATIVE
SPECIMEN EXPIRATION DATE: NORMAL

## 2024-04-15 ENCOUNTER — ANESTHESIA EVENT (OUTPATIENT)
Age: 74
DRG: 470 | End: 2024-04-15
Payer: COMMERCIAL

## 2024-04-16 ENCOUNTER — EVALUATION (OUTPATIENT)
Dept: PHYSICAL THERAPY | Facility: CLINIC | Age: 74
End: 2024-04-16
Payer: COMMERCIAL

## 2024-04-16 DIAGNOSIS — M16.12 PRIMARY OSTEOARTHRITIS OF ONE HIP, LEFT: Primary | ICD-10-CM

## 2024-04-16 PROCEDURE — 97162 PT EVAL MOD COMPLEX 30 MIN: CPT

## 2024-04-16 PROCEDURE — 97110 THERAPEUTIC EXERCISES: CPT

## 2024-04-16 NOTE — PROGRESS NOTES
PT Evaluation     Today's date: 2024  Patient name: Anisa Rowe  : 1950  MRN: [id number]  Referring provider: Jethro Ray,*  Dx:   Encounter Diagnosis     ICD-10-CM    1. Primary osteoarthritis of one hip, left  M16.12 Ambulatory referral to Physical Therapy          Start Time: 1230  Stop Time: 1315  Total time in clinic (min): 45 minutes    Assessment  Assessment details: Patient is a pleasant 73 y.o. female who presents to physical therapy for pre-op appointment for a L posterior total hip arthroplasty on 2024.  No further referral appears necessary at this time based upon examination results.    Primary movement impairment diagnosis of hip hypomobility and gluteal weakness resulting in pathoanatomical symptoms of increased symptom irritability, decreased ROM, and decreased strength. This limits Anisa's ability to perform caretaker duties and garden w/o symptom irritability.     Prognosis is good given HEP compliance and PT 2 times per week over the next 16 weeks. Positive prognostic indicators include positive attitude toward recovery and current level of activity.  Please contact me if you have any questions.  Thank you for the opportunity to share in Anisa Rowe care.    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 w/o symptom irritability  Pt will improve FOTO by MDC      Plan  Patient would benefit  from: skilled physical therapy  Referral necessary: No  Planned modality interventions: electrical stimulation/Russian stimulation  Planned therapy interventions: abdominal trunk stabilization, activity modification, IASTM, joint mobilization, manual therapy, massage, nerve gliding, neuromuscular re-education, patient education, postural training, strengthening, stretching, therapeutic activities, therapeutic exercise, therapeutic training, transfer training, home exercise program, graded exercise, graded activity, gait training, flexibility, functional ROM exercises, body mechanics training, behavior modification, balance/weight bearing training, balance and ADL retraining  Frequency: 2x week  Duration in weeks: 16  Plan of Care beginning date: 4/16/2024  Plan of Care expiration date: 8/6/2024  Treatment plan discussed with: patient        Subjective Evaluation    History of Present Illness  Mechanism of injury: Pt reports to outpatient PT for pre-op appointment for a L posterior total hip arthroplasty on April 29, 2024. Pt has been experiencing bilateral knee pain and L sided hip pain for many years and has been slowly getting less functional and having more symptom irritability. Pt reports that her knees are more bothersome, but that she wants to get the hip replacement first and hopes PT can improve the knees as well during that time. Pt's L hip pain is in the groin region and along the lower back, has a frequent dull ache down the posterior aspect of BLE. No onset or episodes of numbness and tingling In either LE. Pt reports that she has been more fatigued as of recent and that ascending stairs has been very difficult. Currently, sitting long periods or standing long periods cause more pain in both the hips and knees. Pt has difficulty standing after sitting for prolonged periods. Pt is very active and wants to return to gardening after surgery. Pt is also her 's primary caretaker as he has Parkinson's.  No red flag present.           Recurrent probem    Quality of life: fair    Patient Goals  Patient goals for therapy: decreased pain, improved balance, increased motion, increased strength and independence with ADLs/IADLs  Patient goal: Return to gardening, Primary caretaker of   Pain  Current pain ratin  At best pain ratin  At worst pain ratin  Quality: dull ache, tight, sharp and discomfort  Relieving factors: medications (icey-hot)  Aggravating factors: stair climbing, walking, standing, lifting and sitting  Progression: worsening    Social Support  Steps to enter house: yes  4  Stairs in house: yes   12  Lives in: multiple-level home (Tub with shower first floor)  Lives with: spouse    Employment status: not working  Hand dominance: right    Treatments  Previous treatment: medication and injection treatment        Objective     Lumbar Screen  Lumbar range of motion within normal limits.    Neurological Testing     Sensation     Hip   Left Hip   Intact: light touch    Right Hip   Intact: light touch    Active Range of Motion   Left Hip   Normal active range of motion  External rotation (90/90): with pain  Internal rotation (90/90): with pain    Right Hip   Normal active range of motion    Additional Active Range of Motion Details  More pain with IR compared to ER    Passive Range of Motion   Left Hip   Flexion: WFL  Extension: WFL  Abduction: WFL  Adduction: WFL  External rotation (90/90): with pain  Internal rotation (90/90): with pain    Right Hip   Normal passive range of motion    Additional Passive Range of Motion Details  IR limited 25%  ER limited 10%  Bilateral knee extension limited 10 degrees extension     Strength/Myotome Testing     Left Hip   Planes of Motion   Flexion: 4+  Extension: 4  Abduction: 4  Adduction: 4+  External rotation: WFL  Internal rotation: WFL    Isolated Muscles   Gluteus paradise: 4-  Gluteus medius: 4    Right Hip   Planes of Motion   Flexion: 4+  Extension:  4  Abduction: 4  Adduction: 4+  External rotation: WFL  Internal rotation: WFL    Isolated Muscles   Gluteus maximums: 4-  Gluteus medius: 4    Left Knee   Flexion: 4+  Prone flexion: 4  Extension: 4+  Quadriceps contraction: good    Right Knee   Flexion: 4+  Prone flexion: 4  Extension: 4+  Quadriceps contraction: good    General Comments:      Hip Comments   Educated Pt on posterior hip replacement precautions  Educated Pt on first floor set-up, removal of throw rugs and activity modification   Educated Pt on recovery timeline  Educated Pt on HEP  Pt has established other caretakers for the first few weeks to help assist her  as she recovers from surgery.                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   8/6/24 BOMN PEND BOMN                           Visit/Unit Tracking  AUTH Status:  Date 4/16              PEND Used 1               Remaining                  HEP:  [id number]   Manuals 4/16                                                                Neuro Re-Ed                                                                                                        Ther Ex             HEP Education 10'                                                                                                       Ther Activity                                       Gait Training                                       Modalities

## 2024-04-28 NOTE — DISCHARGE INSTR - AVS FIRST PAGE
Discharge Instructions - Orthopedics  Anisa Rowe 73 y.o. female MRN: [id number]  Unit/Bed#:     Weight Bearing Status:                                           Weight Bearing as tolerated to left lower extremity.    Be sure to maintain posterior hip precautions: no bending at waist, no crossing legs, on internally rotating surgical leg    DVT prophylaxis:  Complete course of Lovenox at home over 28 days.     Pain:  Continue pain medications as directed.  Continue other medications provided as prescribed.     Showering Instructions:   Do not shower until first follow up appointment.     Dressing Instructions:   Keep dressing clean, dry and intact until follow up appointment.    Driving Instructions:  No driving until cleared by Orthopaedic Surgery.    PT/OT:  Continue PT/OT on outpatient basis as directed    Appt Instructions:   Follow up as scheduled on 5/10/2024 at 11:00am in [place]   If you need to change or cancel your appointment for any reason, please call the clinic at [phone]    Contact the office sooner if you experience any increased numbness/tingling in the extremities.    Miscellaneous:  Advise use of abduction pillow (pink pillow) for 6 weeks after surgery when sleeping.    Please contact the office if you need refills of your medications prior to your next visit.   Advise against any dental cleanings or procedures for 3 months after surgery.   - If there is a dental emergency, please contact the office for further instructions.

## 2024-04-29 ENCOUNTER — HOSPITAL ENCOUNTER (INPATIENT)
Age: 74
LOS: 1 days | Discharge: HOME HEALTH | DRG: 470 | End: 2024-05-01
Attending: ORTHOPAEDIC SURGERY | Admitting: ORTHOPAEDIC SURGERY
Payer: COMMERCIAL

## 2024-04-29 ENCOUNTER — ANESTHESIA (OUTPATIENT)
Age: 74
DRG: 470 | End: 2024-04-29
Payer: COMMERCIAL

## 2024-04-29 DIAGNOSIS — M16.12 PRIMARY OSTEOARTHRITIS OF ONE HIP, LEFT: Primary | ICD-10-CM

## 2024-04-29 PROCEDURE — 97167 OT EVAL HIGH COMPLEX 60 MIN: CPT

## 2024-04-29 PROCEDURE — C1776 JOINT DEVICE (IMPLANTABLE): HCPCS | Performed by: ORTHOPAEDIC SURGERY

## 2024-04-29 PROCEDURE — 27130 TOTAL HIP ARTHROPLASTY: CPT | Performed by: ORTHOPAEDIC SURGERY

## 2024-04-29 PROCEDURE — 97535 SELF CARE MNGMENT TRAINING: CPT

## 2024-04-29 PROCEDURE — 99024 POSTOP FOLLOW-UP VISIT: CPT | Performed by: PHYSICIAN ASSISTANT

## 2024-04-29 PROCEDURE — C1713 ANCHOR/SCREW BN/BN,TIS/BN: HCPCS | Performed by: ORTHOPAEDIC SURGERY

## 2024-04-29 PROCEDURE — 0SRB02A REPLACEMENT OF LEFT HIP JOINT WITH METAL ON POLYETHYLENE SYNTHETIC SUBSTITUTE, UNCEMENTED, OPEN APPROACH: ICD-10-PCS | Performed by: ORTHOPAEDIC SURGERY

## 2024-04-29 PROCEDURE — NC001 PR NO CHARGE: Performed by: ORTHOPAEDIC SURGERY

## 2024-04-29 PROCEDURE — 97163 PT EVAL HIGH COMPLEX 45 MIN: CPT | Performed by: PHYSICAL THERAPIST

## 2024-04-29 DEVICE — CORAIL HIP SYSTEM CEMENTLESS FEMORAL STEM 12/14 AMT 135 DEGREES KHO SIZE 13 HA COATED HIGH OFFSET NO COLLAR
Type: IMPLANTABLE DEVICE | Site: HIP | Status: FUNCTIONAL
Brand: CORAIL

## 2024-04-29 DEVICE — PINNACLE CANCELLOUS BONE SCREW 6.5MM X 35MM
Type: IMPLANTABLE DEVICE | Site: HIP | Status: FUNCTIONAL
Brand: PINNACLE

## 2024-04-29 DEVICE — PINNACLE POROCOAT ACETABULAR SHELL SECTOR II 52MM OD
Type: IMPLANTABLE DEVICE | Site: HIP | Status: FUNCTIONAL
Brand: PINNACLE POROCOAT

## 2024-04-29 DEVICE — M-SPEC METAL FEMORAL HEAD 12/14 TAPER DIAMETER 36MM +1.5: Type: IMPLANTABLE DEVICE | Site: HIP | Status: FUNCTIONAL

## 2024-04-29 DEVICE — PINNACLE HIP SOLUTIONS ALTRX POLYETHYLENE ACETABULAR LINER +4 10 DEGREE 36MM ID 52MM OD
Type: IMPLANTABLE DEVICE | Site: HIP | Status: FUNCTIONAL
Brand: PINNACLE ALTRX

## 2024-04-29 RX ORDER — ONDANSETRON 2 MG/ML
4 INJECTION INTRAMUSCULAR; INTRAVENOUS ONCE AS NEEDED
Status: COMPLETED | OUTPATIENT
Start: 2024-04-29 | End: 2024-04-29

## 2024-04-29 RX ORDER — ENOXAPARIN SODIUM 100 MG/ML
40 INJECTION SUBCUTANEOUS DAILY
Status: DISCONTINUED | OUTPATIENT
Start: 2024-04-29 | End: 2024-05-01 | Stop reason: HOSPADM

## 2024-04-29 RX ORDER — OXYCODONE HYDROCHLORIDE 5 MG/1
5 TABLET ORAL EVERY 6 HOURS PRN
Qty: 20 TABLET | Refills: 0 | Status: SHIPPED | OUTPATIENT
Start: 2024-04-29 | End: 2024-05-09

## 2024-04-29 RX ORDER — ONDANSETRON 2 MG/ML
4 INJECTION INTRAMUSCULAR; INTRAVENOUS EVERY 6 HOURS PRN
Status: DISCONTINUED | OUTPATIENT
Start: 2024-04-29 | End: 2024-05-01 | Stop reason: HOSPADM

## 2024-04-29 RX ORDER — LIDOCAINE HYDROCHLORIDE 10 MG/ML
INJECTION, SOLUTION EPIDURAL; INFILTRATION; INTRACAUDAL; PERINEURAL AS NEEDED
Status: DISCONTINUED | OUTPATIENT
Start: 2024-04-29 | End: 2024-04-29

## 2024-04-29 RX ORDER — PROMETHAZINE HYDROCHLORIDE 25 MG/ML
12.5 INJECTION, SOLUTION INTRAMUSCULAR; INTRAVENOUS ONCE
Status: COMPLETED | OUTPATIENT
Start: 2024-04-29 | End: 2024-04-29

## 2024-04-29 RX ORDER — CEFAZOLIN SODIUM 2 G/50ML
2000 SOLUTION INTRAVENOUS ONCE
Status: COMPLETED | OUTPATIENT
Start: 2024-04-29 | End: 2024-04-29

## 2024-04-29 RX ORDER — KETOROLAC TROMETHAMINE 30 MG/ML
15 INJECTION, SOLUTION INTRAMUSCULAR; INTRAVENOUS ONCE
Status: COMPLETED | OUTPATIENT
Start: 2024-04-29 | End: 2024-04-29

## 2024-04-29 RX ORDER — CALCIUM CARBONATE 500 MG/1
1000 TABLET, CHEWABLE ORAL DAILY PRN
Status: DISCONTINUED | OUTPATIENT
Start: 2024-04-29 | End: 2024-05-01 | Stop reason: HOSPADM

## 2024-04-29 RX ORDER — FENTANYL CITRATE/PF 50 MCG/ML
25 SYRINGE (ML) INJECTION
Status: DISCONTINUED | OUTPATIENT
Start: 2024-04-29 | End: 2024-04-29 | Stop reason: HOSPADM

## 2024-04-29 RX ORDER — DOCUSATE SODIUM 100 MG/1
100 CAPSULE, LIQUID FILLED ORAL 2 TIMES DAILY
Status: DISCONTINUED | OUTPATIENT
Start: 2024-04-29 | End: 2024-05-01 | Stop reason: HOSPADM

## 2024-04-29 RX ORDER — GABAPENTIN 300 MG/1
300 CAPSULE ORAL ONCE
Status: COMPLETED | OUTPATIENT
Start: 2024-04-29 | End: 2024-04-29

## 2024-04-29 RX ORDER — TRANEXAMIC ACID 10 MG/ML
1000 INJECTION, SOLUTION INTRAVENOUS ONCE
Status: COMPLETED | OUTPATIENT
Start: 2024-04-29 | End: 2024-04-29

## 2024-04-29 RX ORDER — METHOCARBAMOL 500 MG/1
500 TABLET, FILM COATED ORAL EVERY 6 HOURS SCHEDULED
Status: DISCONTINUED | OUTPATIENT
Start: 2024-04-29 | End: 2024-05-01 | Stop reason: HOSPADM

## 2024-04-29 RX ORDER — SODIUM CHLORIDE, SODIUM LACTATE, POTASSIUM CHLORIDE, CALCIUM CHLORIDE 600; 310; 30; 20 MG/100ML; MG/100ML; MG/100ML; MG/100ML
125 INJECTION, SOLUTION INTRAVENOUS CONTINUOUS
Status: DISCONTINUED | OUTPATIENT
Start: 2024-04-29 | End: 2024-05-01 | Stop reason: HOSPADM

## 2024-04-29 RX ORDER — HYDROMORPHONE HCL IN WATER/PF 6 MG/30 ML
0.2 PATIENT CONTROLLED ANALGESIA SYRINGE INTRAVENOUS
Status: DISCONTINUED | OUTPATIENT
Start: 2024-04-29 | End: 2024-04-29 | Stop reason: HOSPADM

## 2024-04-29 RX ORDER — MAGNESIUM HYDROXIDE 1200 MG/15ML
LIQUID ORAL AS NEEDED
Status: DISCONTINUED | OUTPATIENT
Start: 2024-04-29 | End: 2024-04-29 | Stop reason: HOSPADM

## 2024-04-29 RX ORDER — OXYCODONE HYDROCHLORIDE 5 MG/1
2.5 TABLET ORAL EVERY 4 HOURS PRN
Status: DISCONTINUED | OUTPATIENT
Start: 2024-04-29 | End: 2024-05-01 | Stop reason: HOSPADM

## 2024-04-29 RX ORDER — PROMETHAZINE HYDROCHLORIDE 25 MG/ML
INJECTION, SOLUTION INTRAMUSCULAR; INTRAVENOUS
Status: DISCONTINUED
Start: 2024-04-29 | End: 2024-05-01 | Stop reason: HOSPADM

## 2024-04-29 RX ORDER — ACETAMINOPHEN 325 MG/1
975 TABLET ORAL ONCE
Status: COMPLETED | OUTPATIENT
Start: 2024-04-29 | End: 2024-04-29

## 2024-04-29 RX ORDER — CEFAZOLIN SODIUM 1 G/50ML
1000 SOLUTION INTRAVENOUS EVERY 8 HOURS
Status: COMPLETED | OUTPATIENT
Start: 2024-04-29 | End: 2024-04-30

## 2024-04-29 RX ORDER — PROPOFOL 10 MG/ML
INJECTION, EMULSION INTRAVENOUS CONTINUOUS PRN
Status: DISCONTINUED | OUTPATIENT
Start: 2024-04-29 | End: 2024-04-29

## 2024-04-29 RX ORDER — ONDANSETRON 2 MG/ML
INJECTION INTRAMUSCULAR; INTRAVENOUS AS NEEDED
Status: DISCONTINUED | OUTPATIENT
Start: 2024-04-29 | End: 2024-04-29

## 2024-04-29 RX ORDER — HYDROMORPHONE HCL/PF 1 MG/ML
0.5 SYRINGE (ML) INJECTION EVERY 2 HOUR PRN
Status: DISPENSED | OUTPATIENT
Start: 2024-04-29 | End: 2024-05-01

## 2024-04-29 RX ORDER — CHLORHEXIDINE GLUCONATE ORAL RINSE 1.2 MG/ML
15 SOLUTION DENTAL ONCE
Status: DISCONTINUED | OUTPATIENT
Start: 2024-04-29 | End: 2024-04-29 | Stop reason: HOSPADM

## 2024-04-29 RX ORDER — PHENYLEPHRINE HCL IN 0.9% NACL 1 MG/10 ML
SYRINGE (ML) INTRAVENOUS AS NEEDED
Status: DISCONTINUED | OUTPATIENT
Start: 2024-04-29 | End: 2024-04-29

## 2024-04-29 RX ORDER — OXYCODONE HYDROCHLORIDE 5 MG/1
5 TABLET ORAL EVERY 4 HOURS PRN
Status: DISCONTINUED | OUTPATIENT
Start: 2024-04-29 | End: 2024-05-01 | Stop reason: HOSPADM

## 2024-04-29 RX ORDER — METHOCARBAMOL 500 MG/1
500 TABLET, FILM COATED ORAL 3 TIMES DAILY PRN
Qty: 60 TABLET | Refills: 0 | Status: SHIPPED | OUTPATIENT
Start: 2024-04-29

## 2024-04-29 RX ORDER — GABAPENTIN 100 MG/1
100 CAPSULE ORAL EVERY 8 HOURS
Status: DISCONTINUED | OUTPATIENT
Start: 2024-04-29 | End: 2024-05-01 | Stop reason: HOSPADM

## 2024-04-29 RX ORDER — EPHEDRINE SULFATE 50 MG/ML
INJECTION INTRAVENOUS AS NEEDED
Status: DISCONTINUED | OUTPATIENT
Start: 2024-04-29 | End: 2024-04-29

## 2024-04-29 RX ORDER — ACETAMINOPHEN 325 MG/1
650 TABLET ORAL EVERY 6 HOURS PRN
Status: DISCONTINUED | OUTPATIENT
Start: 2024-04-29 | End: 2024-05-01 | Stop reason: HOSPADM

## 2024-04-29 RX ADMIN — METHOCARBAMOL 500 MG: 500 TABLET ORAL at 23:32

## 2024-04-29 RX ADMIN — Medication 100 MCG: at 12:21

## 2024-04-29 RX ADMIN — OXYCODONE 5 MG: 5 TABLET ORAL at 22:27

## 2024-04-29 RX ADMIN — GABAPENTIN 300 MG: 300 CAPSULE ORAL at 09:13

## 2024-04-29 RX ADMIN — GABAPENTIN 100 MG: 100 CAPSULE ORAL at 22:27

## 2024-04-29 RX ADMIN — ACETAMINOPHEN 325MG 650 MG: 325 TABLET ORAL at 14:45

## 2024-04-29 RX ADMIN — SODIUM CHLORIDE, SODIUM LACTATE, POTASSIUM CHLORIDE, AND CALCIUM CHLORIDE 125 ML/HR: .6; .31; .03; .02 INJECTION, SOLUTION INTRAVENOUS at 09:12

## 2024-04-29 RX ADMIN — OXYCODONE 5 MG: 5 TABLET ORAL at 14:45

## 2024-04-29 RX ADMIN — PROMETHAZINE HYDROCHLORIDE 12.5 MG: 25 INJECTION INTRAMUSCULAR; INTRAVENOUS at 13:49

## 2024-04-29 RX ADMIN — EPHEDRINE SULFATE 10 MG: 50 INJECTION, SOLUTION INTRAVENOUS at 12:08

## 2024-04-29 RX ADMIN — Medication 100 MCG: at 12:49

## 2024-04-29 RX ADMIN — EPHEDRINE SULFATE 10 MG: 50 INJECTION, SOLUTION INTRAVENOUS at 12:48

## 2024-04-29 RX ADMIN — EPHEDRINE SULFATE 10 MG: 50 INJECTION, SOLUTION INTRAVENOUS at 12:33

## 2024-04-29 RX ADMIN — ENOXAPARIN SODIUM 40 MG: 40 INJECTION SUBCUTANEOUS at 22:31

## 2024-04-29 RX ADMIN — ONDANSETRON 4 MG: 2 INJECTION INTRAMUSCULAR; INTRAVENOUS at 11:35

## 2024-04-29 RX ADMIN — CEFAZOLIN SODIUM 1000 MG: 1 SOLUTION INTRAVENOUS at 20:00

## 2024-04-29 RX ADMIN — DOCUSATE SODIUM 100 MG: 100 CAPSULE, LIQUID FILLED ORAL at 17:19

## 2024-04-29 RX ADMIN — Medication 100 MCG: at 12:34

## 2024-04-29 RX ADMIN — Medication 100 MCG: at 12:04

## 2024-04-29 RX ADMIN — METHOCARBAMOL 500 MG: 500 TABLET ORAL at 17:19

## 2024-04-29 RX ADMIN — KETOROLAC TROMETHAMINE 15 MG: 30 INJECTION, SOLUTION INTRAMUSCULAR at 15:19

## 2024-04-29 RX ADMIN — CEFAZOLIN SODIUM 2000 MG: 2 SOLUTION INTRAVENOUS at 11:36

## 2024-04-29 RX ADMIN — FENTANYL CITRATE 25 MCG: 50 INJECTION INTRAMUSCULAR; INTRAVENOUS at 13:44

## 2024-04-29 RX ADMIN — MEPIVACAINE HYDROCHLORIDE 3 ML: 15 INJECTION, SOLUTION EPIDURAL; INFILTRATION at 11:33

## 2024-04-29 RX ADMIN — ACETAMINOPHEN 325MG 650 MG: 325 TABLET ORAL at 23:32

## 2024-04-29 RX ADMIN — TRANEXAMIC ACID 1000 MG: 10 INJECTION, SOLUTION INTRAVENOUS at 11:46

## 2024-04-29 RX ADMIN — ONDANSETRON 4 MG: 2 INJECTION INTRAMUSCULAR; INTRAVENOUS at 13:22

## 2024-04-29 RX ADMIN — HYDROMORPHONE HYDROCHLORIDE 0.2 MG: 0.2 INJECTION, SOLUTION INTRAMUSCULAR; INTRAVENOUS; SUBCUTANEOUS at 14:00

## 2024-04-29 RX ADMIN — EPHEDRINE SULFATE 5 MG: 50 INJECTION, SOLUTION INTRAVENOUS at 12:21

## 2024-04-29 RX ADMIN — SODIUM CHLORIDE, SODIUM LACTATE, POTASSIUM CHLORIDE, AND CALCIUM CHLORIDE 125 ML/HR: .6; .31; .03; .02 INJECTION, SOLUTION INTRAVENOUS at 14:32

## 2024-04-29 RX ADMIN — ACETAMINOPHEN 325MG 975 MG: 325 TABLET ORAL at 09:13

## 2024-04-29 RX ADMIN — SODIUM CHLORIDE, SODIUM LACTATE, POTASSIUM CHLORIDE, AND CALCIUM CHLORIDE 125 ML/HR: .6; .31; .03; .02 INJECTION, SOLUTION INTRAVENOUS at 22:26

## 2024-04-29 RX ADMIN — PROPOFOL 120 MCG/KG/MIN: 10 INJECTION, EMULSION INTRAVENOUS at 11:36

## 2024-04-29 RX ADMIN — FENTANYL CITRATE 25 MCG: 50 INJECTION INTRAMUSCULAR; INTRAVENOUS at 13:48

## 2024-04-29 RX ADMIN — LIDOCAINE HYDROCHLORIDE 50 MG: 10 INJECTION, SOLUTION EPIDURAL; INFILTRATION; INTRACAUDAL; PERINEURAL at 11:36

## 2024-04-29 NOTE — OP NOTE
OPERATIVE REPORT  PATIENT NAME: Anisa Rowe  : 1950  MRN: [id number]  Pt Location:  [facility] OR ROOM 06    Surgery Date: 2024    Surgeons and Role:     * Jethro Ray MD - Primary     * Piedad Mena PA-C - Assisting     * Abiodun Whyte MD     Preop Diagnosis:  Primary osteoarthritis of one hip, left [M16.12]    Post-Op Diagnosis Codes:     * Primary osteoarthritis of one hip, left [M16.12]    Procedure(s):  Left - ARTHROPLASTY HIP TOTAL    Specimens:  * No specimens in log *    Estimated Blood Loss:   Minimal    Drains:  Urethral Catheter Non-latex 16 Fr. (Active)   Number of days: 1512       Urethral Catheter Double-lumen 16 Fr. (Active)   Number of days: 0       Anesthesia Type:   Choice     Operative Indications:  Primary osteoarthritis of one hip, left [M16.12]    Operative Findings:  Depuy    Cup-52mm metal   Liner-10 degree lipped poly   Head/neck-36 + 1.5mm metal   Femur-13 HO    Complications:   None      Hip Approach: Posterior    Procedure and Technique:  Following the induction medical level of spinal anesthesia, Abernathy catheter was then sterilely introduced to this patient's bladder.  Antibiotics administered.  She was then placed in the right lateral cubitus, left side up position.  An axillary roll was placed underneath the right axilla.  The left hip and lateral thigh were then prepped draped sterilely.  A posterolateral approach was created in order to gain access to the hip.  Full-thickness flaps were raised on access the tensor fascia.  This was split, expose the deep layer of the hip.  With the hip in internal rotation, the piriformis tendon was identified, transected, and retracted in a posterior fashion.  The remainder the short external rotators were sectioned as well.  A T-type capsulotomy was used to open up the hip.  The femoral head was then delivered posteriorly.  Utilizing a femoral neck osteotomy guide, the proper femoral neck cut was then made.  A posterior  capsulectomy, and anterior capsulotomy we then created in order to circumferentially expose the acetabulum.  Reaming started at 48 and extended to 52 mm which point time a Hemisphere bleeding cancellous bone was encountered.  52 trial was inserted and noted to fit well, therefore the 52 mm insert was then hammered into place.  A single screw was then placed within the posterior superior quadrant for enhanced fixation.  The 10 degree lip liner was snapped into position.  The proximal femur was then prepared for insertion of press-fit component.  The box osteotome was used with proximal femur.  Patient's canal sequentially broached.  With a #13 high offset, 36+1.5 femoral head and neck, it was located, taken the range of motion    Adequate stable.  The trial components removed if the hip was carefully dislocated.  The insert components were assembled and introduced the patient's left hip in standard fashion.  The hip was once more located, taken the range of motion    Adequate stable.  Satisfied with the extent of surgery, the wound was then flushed with saline and closed.  A Betadine soak initiated.  The piriformis tendons are approximated to the greater trochanter number Vicryl suture.  The tensor fascia was then closed with number Vicryl suture.  The subcu tissues were closed in layers with a mixture #1 for deep layer, 2-0 Vicryl for the subcutaneous tissues, and skin staples the skin.  Sterile dressings were applied.  She was then transferred to recovery room in stable condition with plans to include physical therapy weightbearing times, she will require DVT prophylaxis with Lovenox   I was present for the entire procedure.    Patient Disposition:  PACU               SIGNATURE: Jehtro Ray MD  DATE: April 29, 2024  TIME: 12:43 PM

## 2024-04-29 NOTE — ANESTHESIA POSTPROCEDURE EVALUATION
Post-Op Assessment Note    CV Status:  Stable  Pain Score: 0    Pain management: adequate       Mental Status:  Alert and awake   Hydration Status:  Euvolemic   PONV Controlled:  Controlled   Airway Patency:  Patent     Post Op Vitals Reviewed: Yes    No anethesia notable event occurred.    Staff: CRNA               BP   112/57   Temp   97.7   Pulse 108   Resp 18   SpO2   100

## 2024-04-29 NOTE — PLAN OF CARE
Problem: PAIN - ADULT  Goal: Verbalizes/displays adequate comfort level or baseline comfort level  Description: Interventions:  - Encourage patient to monitor pain and request assistance  - Assess pain using appropriate pain scale  - Administer analgesics based on type and severity of pain and evaluate response  - Implement non-pharmacological measures as appropriate and evaluate response  - Consider cultural and social influences on pain and pain management  - Notify physician/advanced practitioner if interventions unsuccessful or patient reports new pain  Outcome: Progressing     Problem: INFECTION - ADULT  Goal: Absence or prevention of progression during hospitalization  Description: INTERVENTIONS:  - Assess and monitor for signs and symptoms of infection  - Monitor lab/diagnostic results  - Monitor all insertion sites, i.e. indwelling lines, tubes, and drains  - Monitor endotracheal if appropriate and nasal secretions for changes in amount and color  - [place] appropriate cooling/warming therapies per order  - Administer medications as ordered  - Instruct and encourage patient and family to use good hand hygiene technique  - Identify and instruct in appropriate isolation precautions for identified infection/condition  Outcome: Progressing     Problem: SAFETY ADULT  Goal: Patient will remain free of falls  Description: INTERVENTIONS:  - Educate patient/family on patient safety including physical limitations  - Instruct patient to call for assistance with activity   - Consult OT/PT to assist with strengthening/mobility   - Keep Call bell within reach  - Keep bed low and locked with side rails adjusted as appropriate  - Keep care items and personal belongings within reach  - Initiate and maintain comfort rounds  - Make Fall Risk Sign visible to staff  - Offer Toileting every 2 Hours, in advance of need  - Initiate/Maintain bed alarm  - Obtain necessary fall risk management equipment: yellow socks, yellow  bracelet, pt instructed to call for assistance, call bell in reach and pt rings appropriately, pt in room near nurses' station, fall risk sign visible, bed alarm on  - Apply yellow socks and bracelet for high fall risk patients  - Consider moving patient to room near nurses station  Outcome: Progressing  Goal: Maintain or return to baseline ADL function  Description: INTERVENTIONS:  -  Assess patient's ability to carry out ADLs; assess patient's baseline for ADL function and identify physical deficits which impact ability to perform ADLs (bathing, care of mouth/teeth, toileting, grooming, dressing, etc.)  - Assess/evaluate cause of self-care deficits   - Assess range of motion  - Assess patient's mobility; develop plan if impaired  - Assess patient's need for assistive devices and provide as appropriate  - Encourage maximum independence but intervene and supervise when necessary  - Involve family in performance of ADLs  - Assess for home care needs following discharge   - Consider OT consult to assist with ADL evaluation and planning for discharge  - Provide patient education as appropriate  Outcome: Progressing  Goal: Maintains/Returns to pre admission functional level  Description: INTERVENTIONS:  - Perform AM-PAC 6 Click Basic Mobility/ Daily Activity assessment daily.  - Set and communicate daily mobility goal to care team and patient/family/caregiver.   - Collaborate with rehabilitation services on mobility goals if consulted  - Perform Range of Motion 3 times a day.  - Reposition patient every 2 hours.  - Dangle patient 3 times a day  - Stand patient 3 times a day  - Ambulate patient 3 times a day  - Out of bed to chair 3 times a day   - Out of bed for meals 3 times a day  - Out of bed for toileting  - Record patient progress and toleration of activity level   Outcome: Progressing     Problem: DISCHARGE PLANNING  Goal: Discharge to home or other facility with appropriate resources  Description:  INTERVENTIONS:  - Identify barriers to discharge w/patient and caregiver  - Arrange for needed discharge resources and transportation as appropriate  - Identify discharge learning needs (meds, wound care, etc.)  - Arrange for interpretive services to assist at discharge as needed  - Refer to Case Management Department for coordinating discharge planning if the patient needs post-hospital services based on physician/advanced practitioner order or complex needs related to functional status, cognitive ability, or social support system  Outcome: Progressing     Problem: Knowledge Deficit  Goal: Patient/family/caregiver demonstrates understanding of disease process, treatment plan, medications, and discharge instructions  Description: Complete learning assessment and assess knowledge base.  Interventions:  - Provide teaching at level of understanding  - Provide teaching via preferred learning methods  Outcome: Progressing

## 2024-04-29 NOTE — PLAN OF CARE
Problem: OCCUPATIONAL THERAPY ADULT  Goal: Performs self-care activities at highest level of function for planned discharge setting.  See evaluation for individualized goals.  Description: Treatment Interventions: ADL retraining, Endurance training, Functional transfer training, Patient/family training, Equipment evaluation/education, Compensatory technique education, Continued evaluation, Energy conservation, Activityengagement          See flowsheet documentation for full assessment, interventions and recommendations.   Note: Limitation: Decreased ADL status, Decreased endurance, Decreased self-care trans, Decreased high-level ADLs  Prognosis: Good  Assessment: Pt is a 73 y.o. female seen for OT evaluation s/p adm to [facility] on 4/29/2024 w/ Primary osteoarthritis of one hip, left . Comorbidities affecting pt’s functional performance include a significant PMH of hyperlipidemia, bladder prolapse, osteopenia, urinary incontinence. Pt with active OT orders and activity orders for Activity beginning POD #0. WBAT LLE. Posterior hip precautions. Abduction pillow. Pt lives with spouse in multilevel house with 3-4 COLT w/ rails. Pt has entry level bath/bedroom. Tub/shower with grab bars.  At baseline, pt was independent with all ADLs/IADLs. Pt completed supine to sit with Nancy. Pt completed sit to stand with supervision and RW. Functional mobility from EOB to chair with RW and Nancy. See additional treatment session below focusing on ADLs, transfer, and patient education. Upon evaluation, pt currently requires supervision for UB ADLs, modA for LB ADLs, Nancy for toileting, Nancy for bed mobility, Nancy for functional mobility, and supervision for transfers 2* the following deficits impacting occupational performance: weakness, decreased strength , decreased balance, decreased activity tolerance, increased pain, and orthopedic restrictions. These impairments, as well at pt’s personal factors of: COLT home environment,  steps within home environment, difficulty performing ADLs, difficulty performing IADLs, difficulty performing transfers/mobility, WBS, fall risk , new use of AD for functional transfers/mobility, and inability to perform caregiver duties  limit pt’s ability to safely engage in all baseline areas of occupation. Based on the aforementioned OT evaluation, functional performance deficits, and assessments, pt has been identified as a high complexity evaluation. Pt to continue to benefit from continued acute OT services during hospital stay to address defined deficits and to maximize level of functional independence in the following Occupational Performance areas: eating, grooming, bathing/shower, toilet hygiene, dressing, health maintenance, functional mobility, community mobility, clothing management, cleaning, household maintenance, and job performance/volunteering. From OT standpoint, recommend minimum resource intensity (pending progress) upon D/C. OT will continue to follow pt 3-5x/wk BID prn.     Rehab Resource Intensity Level, OT: III (Minimum Resource Intensity) (pending progress)

## 2024-04-29 NOTE — PLAN OF CARE
Problem: PHYSICAL THERAPY ADULT  Goal: Performs mobility at highest level of function for planned discharge setting.  See evaluation for individualized goals.  Description: Treatment/Interventions: Functional transfer training, LE strengthening/ROM, Therapeutic exercise, Elevations, Endurance training, Bed mobility, Gait training, Spoke to nursing, Spoke to case management, OT  Equipment Recommended: Walker (pt owns)       See flowsheet documentation for full assessment, interventions and recommendations.  Note: Prognosis: Good  Problem List: Decreased strength, Decreased range of motion, Decreased endurance, Impaired balance, Decreased mobility, Orthopedic restrictions, Pain  Assessment: Pt is a 73 y.o. female who presented to [facility] [place] on 4/29/2024 s/p L YONATAN, posterior appraoch done by Dr. Ray. Precautions include WBAT, posterior hip precautions (no hip IR, no hip  flex. >90 deg). Pt  has a past medical history of Arthritis, Exercises daily, Female bladder prolapse, Hyperlipidemia, Hypoglycemia, Lyme disease, Migraine, Osteoporosis, Urinary incontinence, Urinary urgency, Uterine prolapse, Wears dentures, and Wears glasses.. Pt greeted at bedside for PT evaluation on 04/29/24. Pt referred to PT for functional mobility evaluation & D/C planning w/ orders of activity beginning POD #0. PTA, pt reports being I w/o AD and I w/ IADLs. Personal factors affecting pt at time of IE include: lives in 3 story house and stairs to enter home. Please find objective findings from PT assessment regarding body systems outlined above with impairments and limitations including weakness, decreased ROM, impaired balance, decreased endurance, gait deviations, pain, decreased activity tolerance, decreased functional mobility tolerance, fall risk, and orthopedic restrictions.  Please see flow sheet above for objective findings and level of assistance required for safe completion of functional tasks. Pt was able to perform bed mobility with  minAx1 for LE management. Pt able to perform sit<>stand with S off the bed and BSC. Pt able to take 8 steps with RW and minAx1. Vitals taken t/o eval, please see flowsheet for objective data. Reviewed post. Hip precautions with pt, no questions at this time. Pt demonstrated dec endurance and tolerance to activity. Denies reports of dizziness or SOB t/o session. Patient was left in recliner chair  with call bell and all needs within reach. Pt was educated on fall precautions and reinforced w/ good understanding. Based on pt presentation and impaired function, pt would benefit from level III, (minimal resource intensity) at D/C. From PT/mobility standpoint, pt would benefit from OPPT to address deficits as defined above and maximize level of independence and return pt to PLOF.  Nsg staff to continue to mobilized pt (OOB in chair for all meals & ambulate in room/unit) as tolerated to prevent further decline in function. Nsg notified. Co-eval performed with OT to complete this evaluation for the pts best interest given pts medical complexity and functional level.  Barriers to Discharge: Inaccessible home environment, Decreased caregiver support  Barriers to Discharge Comments: COLT, pt requesting HH services following stay, she is the primary cargiver for  who has Parkinsons. Pt states that he needs assistance with dressing and cannot be left alone for long periods of time. CM consulted  Rehab Resource Intensity Level, PT: III (Minimum Resource Intensity)    See flowsheet documentation for full assessment.

## 2024-04-29 NOTE — PHYSICAL THERAPY NOTE
PT EVALUATION    Pt. Name: Anisa Rowe  Pt. Age: 73 y.o.  MRN: [id number]  LENGTH OF STAY: 0    Patient Active Problem List   Diagnosis    Female bladder prolapse    Mixed hyperlipidemia    Primary insomnia    Osteopenia    Overweight (BMI 25.0-29.9)    Incomplete uterovaginal prolapse    Cystocele, midline    Pelvic muscle wasting    Other female genital prolapse    Primary osteoarthritis of one hip, left       Admitting Diagnoses:   Primary osteoarthritis of one hip, left [M16.12]    Past Medical History:   Diagnosis Date    Arthritis     stiff knees    Exercises daily     Female bladder prolapse     VEC today 11/16/2020     pelvic repair 3/2020    Hyperlipidemia     borderline    Hypoglycemia     Lyme disease     3 years ago    Migraine     not recent    Osteoporosis     borderline    Urinary incontinence     at times    Urinary urgency     Uterine prolapse     Wears dentures     bridge lower    Wears glasses        Past Surgical History:   Procedure Laterality Date    KY CMBND ANTERPOST COLPORRAPHY W/CYSTO N/A 11/16/2020    Procedure: ANT COLPORRHAPHY;  Surgeon: Jerrell Zhu MD;  [facility];  Service: UroGynecology           KY COLPOPEXY VAGINAL EXTRAPERITONEAL APPROACH N/A 3/9/2020    Procedure: V.E.C.(ENPLACE);  Surgeon: Jerrell Zhu MD;  [facility];  Service: UroGynecology           KY COLPOPEXY VAGINAL EXTRAPERITONEAL APPROACH N/A 11/16/2020    Procedure: V.E.C. w/ enplace;  Surgeon: Jerrell Zhu MD;  [facility];  Service: UroGynecology           KY CYSTOURETHROSCOPY N/A 3/9/2020    Procedure: CYSTOSCOPY;  Surgeon: Jerrell Zhu MD;  [facility];  Service: UroGynecology           KY CYSTOURETHROSCOPY N/A 11/16/2020    Procedure: CYSTOSCOPY;  Surgeon: Jerrell Zhu MD;  [facility];  Service: UroGynecology           KY POST COLPORRHAPHY RECTOCELE W/WO PERINEORRHAPHY N/A 3/9/2020    Procedure: ANTERIOR AND POST COLPORRHAPHY;  Surgeon:  "Jerrell Zhu MD;  [facility];  Service: UroGynecology           DC SLING OPERATION STRESS INCONTINENCE N/A 3/9/2020    Procedure: SINGLE INCISION SLING;  Surgeon: Jerrell Zhu MD;  [facility];  Service: UroGynecology           VAGINAL DELIVERY      \"47 yrs ago did have sedation\"    WISDOM TOOTH EXTRACTION         Imaging Studies:  No orders to display         04/29/24 1534   PT Last Visit   PT Visit Date 04/29/24   Note Type   Note type Evaluation   Additional Comments pt greeted in supine for PT eval.   Pain Assessment   Pain Assessment Tool 0-10   Pain Score 6   Pain Location/Orientation Orientation: Left;Location: Hip   Hospital Pain Intervention(s) Repositioned;Other (Comment);Ambulation/increased activity;Elevated;Emotional support;Rest   Restrictions/Precautions   Weight Bearing Precautions Per Order Yes   LLE Weight Bearing Per Order WBAT   Braces or Orthoses Other (Comment)  (hip abduction pillow)   Other Precautions Pain;Fall Risk;WBS;THR;Bed Alarm;Chair Alarm;Multiple lines   Home Living   Type of Home House   Home Layout Two level;Performs ADLs on one level;Able to live on main level with bedroom/bathroom;Stairs to enter with rails  (4 COLT rail on L)   Bathroom Shower/Tub Tub/shower unit  (1st floor)   Bathroom Toilet Standard  (toilet raiser on it)   Bathroom Equipment Toilet raiser;Shower chair;Commode   Home Equipment Walker;Cane   Additional Comments bed/bath on 1st floor   Prior Function   Level of [place] Independent with ADLs;Independent with functional mobility;Independent with IADLS   Lives With Spouse   Receives Help From Family;Friend(s)   IADLs Independent with driving;Independent with meal prep;Independent with medication management   Falls in the last 6 months 1 to 4  (2)   Vocational Retired  (pt's  has parkinsons- his caretaker)   Comments (+)    General   Family/Caregiver Present Yes   Cognition   Overall Cognitive Status WFL "   Arousal/Participation Alert   Attention Attends with cues to redirect   Orientation Level Oriented X4   Following Commands Follows one step commands without difficulty   Comments cooperative and motivated   Subjective   Subjective I am doing alright   RUE Assessment   RUE Assessment   (see OT note)   LUE Assessment   LUE Assessment   (see OT note)   RLE Assessment   RLE Assessment WFL   LLE Assessment   LLE Assessment X  (did not assess hip due to post op, able to flex knee to 90 deg. and move ankle through normal ROM)   Light Touch   RLE Light Touch Grossly intact   LLE Light Touch Grossly intact   Bed Mobility   Supine to Sit 4  Minimal assistance   Additional items Increased time required;Verbal cues;Bedrails   Sit to Supine Unable to assess   Additional Comments cues for hand placement. Pt left in recliner chair at end of session   Transfers   Sit to Stand 5  Supervision   Additional items Increased time required;Verbal cues  (RW)   Stand to Sit 5  Supervision   Additional items Increased time required;Verbal cues  (RW)   Toilet transfer 5  Supervision   Additional items Increased time required;Verbal cues;Commode  (RW)   Additional Comments cues for proper LE sequencing with RW and hand placement   Ambulation/Elevation   Gait pattern Improper Weight shift;Antalgic;Forward Flexion;Decreased L stance;Excessively slow;Step to;Decreased heel strike;Decreased toe off   Gait Assistance 4  Minimal assist   Additional items Assist x 1;Verbal cues   Assistive Device Rolling walker   Distance 4 steps to BSC, 4 steps to recliner chair   Ambulation/Elevation Additional Comments cues for proper LE sequencing with RW. VITALS BP supine 133/73, seated /106, seated post ambulation 124/64.   Balance   Static Sitting Fair +   Dynamic Sitting Fair   Static Standing Fair -   Dynamic Standing Poor +   Ambulatory Poor +   Endurance Deficit   Endurance Deficit Yes   Endurance Deficit Description fatigue and pain   Activity  Tolerance   Activity Tolerance Patient limited by fatigue;Patient limited by pain;Patient tolerated treatment well   Medical Staff Made Aware RAE Mei RN Demario   Nurse Made Aware yes   Assessment   Prognosis Good   Problem List Decreased strength;Decreased range of motion;Decreased endurance;Impaired balance;Decreased mobility;Orthopedic restrictions;Pain   Assessment Pt is a 73 y.o. female who presented to [facility] on 4/29/[facility]24 s/p L YONATAN, posterior appraoch done by Dr. Ray. Precautions include WBAT, posterior hip precautions (no hip IR, no hip  flex. >90 deg). Pt  has a past medical history of Arthritis, Exercises daily, Female bladder prolapse, Hyperlipidemia, Hypoglycemia, Lyme disease, Migraine, Osteoporosis, Urinary incontinence, Urinary urgency, Uterine prolapse, Wears dentures, and Wears glasses.. Pt greeted at bedside for PT evaluation on 04/29/24. Pt referred to PT for functional mobility evaluation & D/C planning w/ orders of activity beginning POD #0. PTA, pt reports being I w/o AD and I w/ IADLs. Personal factors affecting pt at time of IE include: lives in 3 story house and stairs to enter home. Please find objective findings from PT assessment regarding body systems outlined above with impairments and limitations including weakness, decreased ROM, impaired balance, decreased endurance, gait deviations, pain, decreased activity tolerance, decreased functional mobility tolerance, fall risk, and orthopedic restrictions.  Please see flow sheet above for objective findings and level of assistance required for safe completion of functional tasks. Pt was able to perform bed mobility with minAx1 for LE management. Pt able to perform sit<>stand with S from the bed and BSC. Pt able to take 8 steps with RW and minAx1. Vitals taken t/o eval, please see flowsheet for objective data. Reviewed post. Hip precautions with pt, no questions at this time. Pt demonstrated dec endurance and tolerance to activity. Denies  reports of dizziness or SOB t/o session. Patient was left in recliner chair  with call bell and all needs within reach. Pt was educated on fall precautions and reinforced w/ good understanding. Based on pt presentation and impaired function, pt would benefit from level III, (minimal resource intensity) at D/C. From PT/mobility standpoint, pt would benefit from OPPT to address deficits as defined above and maximize level of independence and return pt to PLOF.  Nsg staff to continue to mobilized pt (OOB in chair for all meals & ambulate in room/unit) as tolerated to prevent further decline in function. Nsg notified. Co-eval performed with OT to complete this evaluation for the pts best interest given pts medical complexity and functional level.   Barriers to Discharge Inaccessible home environment;Decreased caregiver support   Barriers to Discharge Comments COLT, pt requesting HH servies following stay, she is the primary cargiver for  who has Parkinsons. Pt states that he needs assistance with dressing and cannot be left alone for long periods of time. CM consulted   Goals   Patient Goals to have less pain   STG Expiration Date 05/06/24   Short Term Goal #1 1).  Pt will perform bed mobility with Alireza demonstrating appropriate technique 100% of the time in order to improve function. 2)  Perform all transfers with Alireza demonstrating safe and appropriate technique 100% of the time in order to improve ability to negotiate safely in home environment.3) Amb with least restrictive AD > 200'x1 with Alireza in order to demonstrate ability to negotiate in home environment.4)  Improve overall strength and balance 1/2 grade in order to optimize ability to perform functional tasks and reduce fall risk.5) Increase activity tolerance to 45 minutes in order to improve endurance to functional tasks.6)  Negotiate stairs using most appropriate technique and Alireza in order to be able to negotiate safely in home environment. 7) PT for  ongoing patient and family/caregiver education, DME needs and d/c planning in order to promote highest level of function in least restrictive environment.   Plan   Treatment/Interventions Functional transfer training;LE strengthening/ROM;Therapeutic exercise;Elevations;Endurance training;Bed mobility;Gait training;Spoke to nursing;Spoke to case management;OT   PT Frequency Twice a day  (prn)   Discharge Recommendation   Rehab Resource Intensity Level, PT III (Minimum Resource Intensity)   Equipment Recommended Walker  (pt owns)   Additional Comments The patient's AM-PAC Basic Mobility Inpatient Short Form Raw Score is 17. A Raw score of greater than 16 suggests the patient may benefit from discharge to home. Please also refer to the recommendation of the Physical Therapist for safe discharge planning.   AM-PAC Basic Mobility Inpatient   Turning in Flat Bed Without Bedrails 3   Lying on Back to Sitting on Edge of Flat Bed Without Bedrails 3   Moving Bed to Chair 3   Standing Up From Chair Using Arms 3   Walk in Room 3   Climb 3-5 Stairs With Railing 2   Basic Mobility Inpatient Raw Score 17   Basic Mobility Standardized Score 39.67   [facility] Highest Level Of Mobility   -HL Goal 5: Stand one or more mins   -HL Achieved 5: Stand (1 or more minutes)   End of Consult   Patient Position at End of Consult Bed/Chair alarm activated;All needs within reach;Bedside chair   End of Consult Comments Pt stable, left in recliner chair at end of session. RN updated/aware       Hx/personal factors: co-morbidities, dec caregiver support, mutliple lines, pain, WB restrictions, total hip precautions, and fall risk  Examination: dec mobility, dec balance, dec endurance, dec amb, risk for falls, pain, assessed body system, balance, endurance, amb, D/C disposition & fall risk, WB restrictions, impairements in locomotion, musculoskeletal, balance, endurance, posture, coordination  Clinical: unpredictable (ongoing medical status,  risk for falls, POD #0, and pain mgt)  Complexity: high     Ann Marie Ramon, PT

## 2024-04-29 NOTE — ANESTHESIA PROCEDURE NOTES
Spinal Block    Patient location during procedure: OR  Start time: 4/29/2024 11:33 AM  Reason for block: primary anesthetic  Staffing  Performed by: Roxann Metzger CRNA  Authorized by: Dean Mejia MD    Preanesthetic Checklist  Completed: patient identified, IV checked, site marked, risks and benefits discussed, surgical consent, monitors and equipment checked, pre-op evaluation and timeout performed  Spinal Block  Patient position: sitting  Prep: ChloraPrep  Patient monitoring: heart rate, cardiac monitor, continuous pulse ox and frequent blood pressure checks  Approach: midline  Location: L3-4  Needle  Needle type: Pencan   Needle gauge: 25 G  Needle length: 3.5 in  Assessment  Sensory level: T4  Injection Assessment:  negative aspiration for heme, no paresthesia on injection and positive aspiration for clear CSF.  Post-procedure:  site cleaned

## 2024-04-29 NOTE — CASE MANAGEMENT
Case Management Discharge Planning Note    Patient name Anisa Rowe  Location 2 N /WE 2 N * MRN [id number]  : 1950 Date 2024       Current Admission Date: 2024  Current Admission Diagnosis:Primary osteoarthritis of one hip, left   Patient Active Problem List    Diagnosis Date Noted    Primary osteoarthritis of one hip, left 2024    Incomplete uterovaginal prolapse 2020    Cystocele, midline 2020    Pelvic muscle wasting 2020    Other female genital prolapse 2020    Female bladder prolapse 01/10/2020    Mixed hyperlipidemia 01/10/2020    Primary insomnia 01/10/2020    Osteopenia 01/10/2020    Overweight (BMI 25.0-29.9) 01/10/2020      LOS (days): 0  Geometric Mean LOS (GMLOS) (days):   Days to GMLOS:     OBJECTIVE:            Current admission status: Outpatient Surgery   Preferred Pharmacy:   [id number] - [place], [place] - [address]  [address]  [place] [address]  Phone: [phone] Fax: [phone]    Primary Care Provider: Samir Pradhan DO    Primary Insurance: [employer]  Secondary Insurance:     DISCHARGE DETAILS:                                Requested Home Health Care         Is the patient interested in HHC at discharge?: Yes  Home Health Discipline requested:: Occupational Therapy, Physical Therapy  Home Health Agency Name:: Other (will discuss accepting agencies once return)  HHA External Referral Reason (only applicable if external HHA name selected): Services not provided in network or near patient location  Home Health Follow-Up Provider:: Referring Provider  Home Health Services Needed:: Evaluate Functional Status and Safety, Gait/ADL Training, Strengthening/Theraputic Exercises to Improve Function, Restore Joint Function Post Joint Replacement  Homebound Criteria Met:: Requires the Assistance of Another Person for Safe Ambulation or to Leave the Home, Uses an Assist Device (i.e. cane, walker, etc)  Supporting  Clincal Findings:: Limited Endurance, Fatigues Easliy in Short Distances

## 2024-04-29 NOTE — H&P
H&P Exam - Orthopedics   Anisa Rowe 73 y.o. female MRN: [id number]      Assessment/Plan   Assessment:  left Hip Osteoarthritis in this adult female who continues to have pain and dysfunction despite appropriate nonsurgical treatments    Plan:  left posterior Total Hip Arthroplasty patient is similar with risks, benefits, alternatives      TOTAL HIP REPLACEMENT INDICATIONS AND RISKS  We had a lengthy discussion with the patient regarding the potential options for treatment.  Based on current presentation, radiographic exam, and lack of sufficient response to nonsurgical management including activity modification, NSAIDs and therapeutic exercise, I would offer treatment in the form of total hip arthroplasty at this time.      The potential risks and benefits were discussed in detail.    While no guarantees can be made, total hip replacement has a very high success rate in terms of relieving a patient's hip pain and returning them to a more active, independent lifestyle for 10-15 years or more. All surgery carries some risk; for hip replacement, the complication rate is low but may include: death (very rare), infection, bleeding requiring transfusion, blood clots in legs traveling to lungs, nerve and/or blood vessel damage, bone fracture, leg length difference, prosthetic hip dislocation, persistent hip pain and/or stiffness, and repeat surgery(ies). The risk of a major complication is generally 1-2 per 1000 cases. Total hip replacement should only be done if conservative treatment has failed. The predicted revision rate is approximately 1% per year; in other words, 90% of hip replacements last 10 years or more, 80% last 20 years or more, and so on, assuming no injury. Additionally, we discussed anesthesia related complications which will be discussed in greater detail with the anesthesia team before surgery. The patient voiced their understanding of the surgical plan and potential complications and wishes to proceed  with surgery.    History of Present Illness   HPI:  Anisa Rowe is a 73 y.o. female who presents with pain in the left hip. Patient is no longer getting adequate relief from non-operative modalities. Patient is continuing to have debilitating pain from their hip, interfering with daily activities and sleep. Patient denies any concerns with infections, new neuropathies, or any acute injuries.     Historical Information  Review Of Systems:   Skin: Normal  Neuro: See HPI  Musculoskeletal: See HPI  14 point review of systems negative except as stated above     Past Medical History:   Past Medical History:   Diagnosis Date    Arthritis     stiff knees    Exercises daily     Female bladder prolapse     VEC today 11/16/2020     pelvic repair 3/2020    Hyperlipidemia     borderline    Hypoglycemia     Lyme disease     3 years ago    Migraine     not recent    Osteoporosis     borderline    Urinary incontinence     at times    Urinary urgency     Uterine prolapse     Wears dentures     bridge lower    Wears glasses        Past Surgical History:   Past Surgical History:   Procedure Laterality Date    MI CMBND ANTERPOST COLPORRAPHY W/CYSTO N/A 11/16/2020    Procedure: ANT COLPORRHAPHY;  Surgeon: Jerrell Zhu MD;  Location: [facility];  Service: UroGynecology           MI COLPOPEXY VAGINAL EXTRAPERITONEAL APPROACH N/A 3/9/2020    Procedure: V.E.C.(ENPLACE);  Surgeon: Jerrell Zhu MD;  Location: [facility];  Service: UroGynecology           MI COLPOPEXY VAGINAL EXTRAPERITONEAL APPROACH N/A 11/16/2020    Procedure: V.E.C. w/ enplace;  Surgeon: Jerrell Zhu MD;  [facility];  Service: UroGynecology           MI CYSTOURETHROSCOPY N/A 3/9/2020    Procedure: CYSTOSCOPY;  Surgeon: Jerrell Zhu MD;  [facility];  Service: UroGynecology           MI CYSTOURETHROSCOPY N/A 11/16/2020    Procedure: CYSTOSCOPY;  Surgeon: Jerrell Zhu MD;  [facility];  Service: UroGynecology           MI POST  "COLPORRHAPHY RECTOCELE W/WO PERINEORRHAPHY N/A 3/9/2020    Procedure: ANTERIOR AND POST COLPORRHAPHY;  Surgeon: Jerrell Zhu MD;  Location[facility];  Service: UroGynecology           FL SLING OPERATION STRESS INCONTINENCE N/A 3/9/2020    Procedure: SINGLE INCISION SLING;  Surgeon: Jerrell Zhu MD;  Location: [facility];  Service: UroGynecology           VAGINAL DELIVERY      \"46 yrs ago did have sedation\"    WISDOM TOOTH EXTRACTION         Family History:  Family history reviewed and non-contributory  Family History   Problem Relation Age of Onset    Osteoporosis Mother     Stroke Father     Diabetes Father     Diabetes Sister        Social History:  Social History     Socioeconomic History    Marital status: Registered Domestic Partner     Spouse name: None    Number of children: None    Years of education: None    Highest education level: None   Occupational History    Occupation:      Comment: retired   Tobacco Use    Smoking status: Never    Smokeless tobacco: Never   Vaping Use    Vaping status: Never Used   Substance and Sexual Activity    Alcohol use: No    Drug use: No    Sexual activity: Not Currently   Other Topics Concern    None   Social History Narrative    None     Social Determinants of Health     Financial Resource Strain: Low Risk  (2/14/2024)    Received from [facility]    Overall Financial Resource Strain (CARDIA)     Difficulty of Paying Living Expenses: Not very hard   Food Insecurity: No Food Insecurity (2/14/2024)    Received from [facility]    Hunger Vital Sign     Worried About Running Out of Food in the Last Year: Never true     Ran Out of Food in the Last Year: Never true   Transportation Needs: No Transportation Needs (2/14/2024)    Received from [facility]    PRAPARE - Transportation     Lack of Transportation (Medical): No     Lack of Transportation (Non-Medical): No   Physical Activity: Sufficiently Active (2/14/2024)    Received from [facility]    Exercise Vital Sign    " " Days of Exercise per Week: 4 days     Minutes of Exercise per Session: 60 min   Stress: No Stress Concern Present (2/14/2024)    Received from [facility]    [employer] - Occupational Stress Questionnaire     Feeling of Stress : Not at all   Social Connections: Socially Integrated (2/14/2024)    Received from [facility]    Social Connection and Isolation Panel [NHANES]     Frequency of Communication with Friends and Family: More than three times a week     Frequency of Social Gatherings with Friends and Family: More than three times a week     Attends Christianity Services: 1 to 4 times per year     Active Member of Clubs or Organizations: Yes     Attends Club or Organization Meetings: 1 to 4 times per year     Marital Status:    Intimate Partner Violence: Not At Risk (2/14/2024)    Received from [facility]    Humiliation, Afraid, Rape, and Kick questionnaire     Fear of Current or Ex-Partner: No     Emotionally Abused: No     Physically Abused: No     Sexually Abused: No   Housing Stability: Low Risk  (2/14/2024)    Received from [facility]    Housing Stability Vital Sign     Unable to Pay for Housing in the Last Year: No     Number of Places Lived in the Last Year: 1     Unstable Housing in the Last Year: No       Allergies:   Allergies   Allergen Reactions    Other Nausea Only and Headache     Chemicals and perfumes    Wheat Bran - Food Allergy Diarrhea     \"just wheat\"           Labs:  0   Lab Value Date/Time    HCT 42.3 04/10/2024 1139    HCT 42.3 12/29/2021 2359    HGB 14.5 04/10/2024 1139    HGB 14.2 12/29/2021 2359    INR 0.99 04/10/2024 1139    WBC 5.75 04/10/2024 1139    WBC 2.96 (L) 12/29/2021 2359       Meds:    Current Facility-Administered Medications:     acetaminophen (TYLENOL) tablet 650 mg, 650 mg, Oral, Q6H PRN, Piedad Mena PA-C    calcium carbonate (TUMS) chewable tablet 1,000 mg, 1,000 mg, Oral, Daily PRN, Piedad Mena PA-C    ceFAZolin (ANCEF) IVPB (premix in " "dextrose) 1,000 mg 50 mL, 1,000 mg, Intravenous, Q8H, Piedad Mena PA-C    ceFAZolin (ANCEF) IVPB (premix in dextrose) 2,000 mg 50 mL, 2,000 mg, Intravenous, Once, Piedad Mena PA-C    chlorhexidine (PERIDEX) 0.12 % oral rinse 15 mL, 15 mL, Swish & Spit, Once, Piedad Mena PA-C    docusate sodium (COLACE) capsule 100 mg, 100 mg, Oral, BID, Piedad Mena PA-C    enoxaparin (LOVENOX) subcutaneous injection 40 mg, 40 mg, Subcutaneous, Daily, Piedad Mena PA-C    gabapentin (NEURONTIN) capsule 100 mg, 100 mg, Oral, Q8H, Piedad Mena PA-C    HYDROmorphone (DILAUDID) injection 0.5 mg, 0.5 mg, Intravenous, Q2H PRN, Piedad Mena PA-C    lactated ringers bolus 1,000 mL, 1,000 mL, Intravenous, Once PRN **AND** lactated ringers bolus 1,000 mL, 1,000 mL, Intravenous, Once PRN, Piedad Mena PA-C    lactated ringers infusion, 125 mL/hr, Intravenous, Continuous, Piedad Mena PA-C    lactated ringers infusion, 125 mL/hr, Intravenous, Continuous, Piedad Mena PA-C, Last Rate: 125 mL/hr at 04/29/24 0912, 125 mL/hr at 04/29/24 0912    methocarbamol (ROBAXIN) tablet 500 mg, 500 mg, Oral, Q6H JAYSHREE, Piedad Mena PA-C    ondansetron (ZOFRAN) injection 4 mg, 4 mg, Intravenous, Q6H PRN, Piedad Mena PA-C    oxyCODONE (ROXICODONE) IR tablet 2.5 mg, 2.5 mg, Oral, Q4H PRN, Piedad Mena PA-C    oxyCODONE (ROXICODONE) IR tablet 5 mg, 5 mg, Oral, Q4H PRN, Piedad Mena PA-C    povidone-iodine (BETADINE) 10 % 20 Application in sodium chloride 0.9 % 500 mL irrigation bottle, , Irrigation, Once, Jethro Ray MD    sodium chloride 0.9 % bolus 1,000 mL, 1,000 mL, Intravenous, Once PRN **AND** sodium chloride 0.9 % bolus 1,000 mL, 1,000 mL, Intravenous, Once PRN, Piedad Mena PA-C    tranexamic acid (CYKLOKAPRON) 1000-0.7 MG/100ML-% injection 1,000 mg, 1,000 mg, Intravenous, Once, Piedad Mena PA-C    Blood Culture:   No results found for: \"BLOODCX\"    Wound Culture:   No results found for: \"WOUNDCULT\"    Ins and Outs:  No intake/output data " "recorded.          Physical Exam  /75   Pulse 69   Temp 98.1 °F (36.7 °C) (Temporal)   Resp 18   Ht 5' 8\" (1.727 m)   Wt 74.8 kg (165 lb)   SpO2 99%   BMI 25.09 kg/m²   /75   Pulse 69   Temp 98.1 °F (36.7 °C) (Temporal)   Resp 18   Ht 5' 8\" (1.727 m)   Wt 74.8 kg (165 lb)   SpO2 99%   BMI 25.09 kg/m²   Gen: No acute distress, resting comfortably in bed  HEENT: Eyes clear, moist mucus membranes, hearing intact  Respiratory: No audible wheezing or stridor  Cardiovascular: Well Perfused peripherally, 2+ distal pulse  Abdomen: nondistended, no peritoneal signs  Ortho Exam: limited hip ROM due to pain and mechanical blocking  Neuro Exam: intact    Lab Results: Reviewed  Imaging: Reviewed   "

## 2024-04-29 NOTE — ANESTHESIA PREPROCEDURE EVALUATION
Procedure:  ARTHROPLASTY HIP TOTAL (Left: Hip)     - denies any chest pain, palpitations, shortness of breath, syncope, lightheadedness, seizures   - denies any recent infectious symptoms such as fevers, chills, cough   - denies taking any anticoagulation medications or any issues with bleeding, bruising, clotting    EKG (04/10/24):  NSR, HR 78bpm, Qtc 437    Relevant Problems   ANESTHESIA (within normal limits)      CARDIO   (+) Mixed hyperlipidemia      ENDO (within normal limits)      GI/HEPATIC (within normal limits)      /RENAL (within normal limits)      GYN (within normal limits)      HEMATOLOGY (within normal limits)      MUSCULOSKELETAL   (+) Pelvic muscle wasting   (+) Primary osteoarthritis of one hip, left      NEURO/PSYCH (within normal limits)      PULMONARY (within normal limits)      Obstetrics/Gynecology   (+) Female bladder prolapse      Lab Results   Component Value Date    WBC 5.75 04/10/2024    HGB 14.5 04/10/2024    HCT 42.3 04/10/2024    MCV 89 04/10/2024     04/10/2024     Lab Results   Component Value Date    SODIUM 140 04/10/2024    K 4.2 04/10/2024     04/10/2024    CO2 28 04/10/2024    AGAP 6 04/10/2024    BUN 15 04/10/2024    CREATININE 0.65 04/10/2024    GLUC 113 04/10/2024    CALCIUM 9.6 04/10/2024    AST 18 04/10/2024    ALT 16 04/10/2024    ALKPHOS 84 04/10/2024    TP 6.7 04/10/2024    TBILI 1.00 04/10/2024    EGFR 87 04/10/2024     Lab Results   Component Value Date    PTT 34 04/10/2024     Lab Results   Component Value Date    INR 0.99 04/10/2024    INR 3.40 (A) 12/18/2023    PROTIME 13.7 04/10/2024       Physical Exam    Airway    Mallampati score: II  TM Distance: >3 FB  Neck ROM: full     Dental       Cardiovascular  Rhythm: regular, Rate: normal    Pulmonary   Breath sounds clear to auscultation    Other Findings  post-pubertal.      Anesthesia Plan  ASA Score- 2     Anesthesia Type- spinal with ASA Monitors.         Additional Monitors:     Airway Plan:      Comment: Spinal with IV sedation.       Plan Factors-Exercise tolerance (METS): >4 METS.    Chart reviewed. EKG reviewed.  Existing labs reviewed. Patient summary reviewed.    Patient is not a current smoker.  Patient did not smoke on day of surgery.    Obstructive sleep apnea risk education given perioperatively.        Induction- intravenous.    Postoperative Plan- Plan for postoperative opioid use.     Informed Consent- Anesthetic plan and risks discussed with patient.  I personally reviewed this patient with the CRNA. Discussed and agreed on the Anesthesia Plan with the CRNA..

## 2024-04-29 NOTE — OCCUPATIONAL THERAPY NOTE
Occupational Therapy Evaluation and Treatment     Patient Name: Anisa Rowe  Today's Date: 4/29/2024  Problem List  Principal Problem:    Primary osteoarthritis of one hip, left    Past Medical History  Past Medical History:   Diagnosis Date    Arthritis     stiff knees    Exercises daily     Female bladder prolapse     VEC today 11/16/2020     pelvic repair 3/2020    Hyperlipidemia     borderline    Hypoglycemia     Lyme disease     3 years ago    Migraine     not recent    Osteoporosis     borderline    Urinary incontinence     at times    Urinary urgency     Uterine prolapse     Wears dentures     bridge lower    Wears glasses      Past Surgical History  Past Surgical History:   Procedure Laterality Date    LA CMBND ANTERPOST COLPORRAPHY W/CYSTO N/A 11/16/2020    Procedure: ANT COLPORRHAPHY;  Surgeon: Jerrell Zhu MD;  [facility];  Service: UroGynecology           LA COLPOPEXY VAGINAL EXTRAPERITONEAL APPROACH N/A 3/9/2020    Procedure: V.E.C.(ENPLACE);  Surgeon: Jerrell Zhu MD;  [facility];  Service: UroGynecology           LA COLPOPEXY VAGINAL EXTRAPERITONEAL APPROACH N/A 11/16/2020    Procedure: V.E.C. w/ enplace;  Surgeon: Jerrell Zhu MD;  [facility];  Service: UroGynecology           LA CYSTOURETHROSCOPY N/A 3/9/2020    Procedure: CYSTOSCOPY;  Surgeon: Jerrell Zhu MD;  [facility];  Service: UroGynecology           LA CYSTOURETHROSCOPY N/A 11/16/2020    Procedure: CYSTOSCOPY;  Surgeon: Jerrell Zhu MD;  [facility];  Service: UroGynecology           LA POST COLPORRHAPHY RECTOCELE W/WO PERINEORRHAPHY N/A 3/9/2020    Procedure: ANTERIOR AND POST COLPORRHAPHY;  Surgeon: Jerrell Zhu MD;  [facility];  Service: UroGynecology           LA SLING OPERATION STRESS INCONTINENCE N/A 3/9/2020    Procedure: SINGLE INCISION SLING;  Surgeon: Jerrell Zhu MD;  [facility];  Service: UroGynecology           VAGINAL DELIVERY    "   \"47 yrs ago did have sedation\"    WISDOM TOOTH EXTRACTION            04/29/24 1532   OT Last Visit   OT Visit Date 04/29/24   Note Type   Note type Evaluation  (treatment)   Additional Comments pt greeted supine in bed, agreeable to OT evaluation.   Pain Assessment   Pain Assessment Tool 0-10   Pain Score 6   Pain Location/Orientation Orientation: Left;Location: Hip   Hospital Pain Intervention(s) Repositioned;Ambulation/increased activity;Elevated;Emotional support;Rest   Restrictions/Precautions   Weight Bearing Precautions Per Order Yes   LLE Weight Bearing Per Order WBAT   Braces or Orthoses Other (Comment)  (hip abduction pillow)   Other Precautions WBS;Chair Alarm;Bed Alarm;Multiple lines;Fall Risk;Pain   Home Living   Type of Home House   Home Layout Two level;Performs ADLs on one level;Able to live on main level with bedroom/bathroom;Stairs to enter with rails  (4 COLT w rail on L)   Bathroom Shower/Tub Tub/shower unit   Bathroom Toilet Standard  (toilet raiser and BSC)   Bathroom Equipment Commode;Toilet raiser;Shower chair   Bathroom Accessibility Accessible   Home Equipment Walker;Cane   Additional Comments Pt is primary caregiver for  with parkinson's, reports he can not be home alone for long periods of time and needs assistance with dressing and meals.   Prior Function   Level of [place] Independent with ADLs;Independent with functional mobility;Independent with IADLS   Lives With Spouse  (Freddy)   Receives Help From Family;Friend(s)   IADLs Independent with driving;Independent with meal prep;Independent with medication management   Falls in the last 6 months 1 to 4  (2 falls)   Vocational Retired  (pt's primary caretaker for ; makes flower pots)   Comments (+)    Lifestyle   Autonomy Independent with all ADLs/IADLs, no AD use at baseline   Reciprocal Relationships Spouse Freddy (has parkinson's), friends and family is able to help post op   Service to Others Retired   Intrinsic " Gratification Gardening   ADL   Where Assessed Edge of bed   Eating Assistance 5  Supervision/Setup   Grooming Assistance 5  Supervision/Setup   UB Bathing Assistance 5  Supervision/Setup   LB Bathing Assistance 3  Moderate Assistance   UB Dressing Assistance 5  Supervision/Setup   LB Dressing Assistance 3  Moderate Assistance   Toileting Assistance  4  Minimal Assistance   Toileting Deficit Setup;Verbal cueing;Supervison/safety;Increased time to complete;Bedside commode   Bed Mobility   Supine to Sit 4  Minimal assistance   Additional items Assist x 1;Bedrails;Increased time required;Verbal cues;LE management   Sit to Supine Unable to assess   Additional Comments verbal cues for hand placement and safety with YONATAN precautions   Transfers   Sit to Stand 5  Supervision   Additional items Increased time required;Verbal cues;Bedrails  (RW)   Stand to Sit 5  Supervision   Additional items Armrests;Increased time required;Verbal cues  (RW)   Toilet transfer 5  Supervision   Additional items Armrests;Increased time required;Verbal cues;Commode  (RW)   Additional Comments verbal cues for proper technique with YONATAN precautions; VITALS, Supine: 133/73, EOB: 139/106, In chair: 124/64 denies any symptoms.   Functional Mobility   Functional Mobility 4  Minimal assistance   Additional Comments minAx1 from EOB to chair then to bedside commode short in room distance with RW   Additional items Rolling walker   Balance   Static Sitting Good   Dynamic Sitting Fair +   Static Standing Fair   Dynamic Standing Fair -   Ambulatory Fair -   Activity Tolerance   Activity Tolerance Patient tolerated treatment well   Medical Staff Made Aware PT Tonya, Pt seen for co-evaluation with skilled Physical Therapy due to clinically unstable presentation, medical complexity, fall risk, functional balance, limited activity tolerance which is a decline from PLOF and may impact overall safety.   Nurse Made Aware HERNANDO MCCARTY Assessment   RUE Assessment  WFL   LUE Assessment   LUE Assessment WFL   Hand Function   Gross Motor Coordination Functional   Fine Motor Coordination Functional   Cognition   Overall Cognitive Status WFL   Arousal/Participation Alert;Cooperative   Attention Within functional limits   Orientation Level Oriented X4   Memory Within functional limits   Following Commands Follows one step commands without difficulty   Comments Cooperative and pleasant   Assessment   Limitation Decreased ADL status;Decreased endurance;Decreased self-care trans;Decreased high-level ADLs   Prognosis Good   Assessment Pt is a 73 y.o. female seen for OT evaluation s/p adm to [facility] on 4/29/2024 w/ Primary osteoarthritis of one hip, left . Comorbidities affecting pt’s functional performance include a significant PMH of hyperlipidemia, bladder prolapse, osteopenia, urinary incontinence. Pt with active OT orders and activity orders for Activity beginning POD #0. WBAT LLE. Posterior hip precautions. Abduction pillow. Pt lives with spouse in multilevel house with 3-4 COLT w/ rails. Pt has entry level bath/bedroom. Tub/shower with grab bars.  At baseline, pt was independent with all ADLs/IADLs. Pt completed supine to sit with Nancy. Pt completed sit to stand with supervision and RW. Functional mobility from EOB to chair with RW and Nancy. See additional treatment session below focusing on ADLs, transfer, and patient education. Upon evaluation, pt currently requires supervision for UB ADLs, modA for LB ADLs, Nancy for toileting, Nancy for bed mobility, Nancy for functional mobility, and supervision for transfers 2* the following deficits impacting occupational performance: weakness, decreased strength , decreased balance, decreased activity tolerance, increased pain, and orthopedic restrictions. These impairments, as well at pt’s personal factors of: COLT home environment, steps within home environment, difficulty performing ADLs, difficulty performing IADLs, difficulty  performing transfers/mobility, WBS, fall risk , new use of AD for functional transfers/mobility, and inability to perform caregiver duties  limit pt’s ability to safely engage in all baseline areas of occupation. Based on the aforementioned OT evaluation, functional performance deficits, and assessments, pt has been identified as a high complexity evaluation. Pt to continue to benefit from continued acute OT services during hospital stay to address defined deficits and to maximize level of functional independence in the following Occupational Performance areas: eating, grooming, bathing/shower, toilet hygiene, dressing, health maintenance, functional mobility, community mobility, clothing management, cleaning, household maintenance, and job performance/volunteering. From OT standpoint, recommend minimum resource intensity (pending progress) upon D/C. OT will continue to follow pt 3-5x/wk BID prn.   Goals   STG Time Frame 3-5   Short Term Goal #1 Pt will demonstrate 100% adherence to 3/3 posterolateral hip precautions during all functional activities w/o cues from therapist   Short Term Goal #2 Pt will be able to state 3/3 posterolateral hip precautions w/o cues from therapist 100% of the time each session to increase safety at home and reduce risk of injury   Short Term Goal  Pt will improve activity tolerance to G for min 30 min treatment sessions for increase engagement in functional tasks   LTG Time Frame 7-10   Long Term Goal #1 Pt will complete bed mobility at a mod I level w/ G balance/safety demonstrated to decrease caregiver assistance required   Long Term Goal #2 Pt will complete LB dressing/self care w/ mod I using adaptive device and DME as needed   Long Term Goal Pt will complete toileting w/ mod I w/ G hygiene/thoroughness using DME as needed   Plan   Treatment Interventions ADL retraining;Endurance training;Functional transfer training;Patient/family training;Equipment evaluation/education;Compensatory  technique education;Continued evaluation;Energy conservation;Activityengagement   Goal Expiration Date 05/06/24   OT Treatment Day 0   OT Frequency 3-5x/wk  (BID prn)   Discharge Recommendation   Rehab Resource Intensity Level, OT III (Minimum Resource Intensity)  (pending progress)   Additional Comments  The patient's raw score on the AM-PAC Daily Activity Inpatient Short Form is 17 . A raw score of less than 19 suggests the patient may benefit from discharge to post-acute rehabilitation services. Please refer to the recommendation of the Occupational Therapist for safe discharge planning.   AM-PAC Daily Activity Inpatient   Lower Body Dressing 2   Bathing 2   Toileting 3   Upper Body Dressing 3   Grooming 3   Eating 4   Daily Activity Raw Score 17   Daily Activity Standardized Score (Calc for Raw Score >=11) 37.26   AM-PAC Applied Cognition Inpatient   Following a Speech/Presentation 4   Understanding Ordinary Conversation 4   Taking Medications 4   Remembering Where Things Are Placed or Put Away 4   Remembering List of 4-5 Errands 4   Taking Care of Complicated Tasks 4   Applied Cognition Raw Score 24   Applied Cognition Standardized Score 62.21   Additional Treatment Session   Start Time 1550   End Time 1600   Treatment Assessment Pt seen for OT treatment session focusing on ADLs/IADLs, functional mobility, functional transfers, patient education, continued evaluation.  Pt with good sitting balance and fair - dynamic standing balance. Pt tolerated treatment well. Pt completed sit to stand from chair with RW and use of armrests with Nancy. Pt completed functional mobility to bedside commode and use of RW. Pt requiring verbal cues for hand placement and safety with YONATAN precautions. Pt educated on proper techniques with YONATAN posterior precautions for safety at home during treatment today. Pt completed functional mobiltiy back to chair with Nancy stand to sit with use of armrests on chair. Pt reports 5/10 pain and no  dizziness. Pt's vitals include: stable in chair: 124/64.     Pt ended session seated in bedside chair. Call bell and phone within reach. All needs met and pt reports no further questions at this time. Continue to recommend minimum resource intensity (pending progress) when medically cleared. OT will continue to follow pt on caseload.   Additional Treatment Day 1   End of Consult   Education Provided Yes;Family or social support of family present for education by provider   Patient Position at End of Consult Bedside chair;Bed/Chair alarm activated;All needs within reach   Nurse Communication Nurse aware of consult   End of Consult Comments Pt seated OOB in chair with chair alarm activated at end of session. Call bell and phone within reach. All needs met and pt reports no further questions for OT at this time.      Hamida Myrick, OT

## 2024-04-30 LAB
ANION GAP SERPL CALCULATED.3IONS-SCNC: 7 MMOL/L (ref 4–13)
BUN SERPL-MCNC: 13 MG/DL (ref 5–25)
CALCIUM SERPL-MCNC: 8.5 MG/DL (ref 8.4–10.2)
CHLORIDE SERPL-SCNC: 104 MMOL/L (ref 96–108)
CO2 SERPL-SCNC: 26 MMOL/L (ref 21–32)
CREAT SERPL-MCNC: 0.64 MG/DL (ref 0.6–1.3)
ERYTHROCYTE [DISTWIDTH] IN BLOOD BY AUTOMATED COUNT: 12.8 % (ref 11.6–15.1)
GFR SERPL CREATININE-BSD FRML MDRD: 88 ML/MIN/1.73SQ M
GLUCOSE SERPL-MCNC: 122 MG/DL (ref 65–140)
HCT VFR BLD AUTO: 35.6 % (ref 34.8–46.1)
HGB BLD-MCNC: 11.8 G/DL (ref 11.5–15.4)
MCH RBC QN AUTO: 30.5 PG (ref 26.8–34.3)
MCHC RBC AUTO-ENTMCNC: 33.1 G/DL (ref 31.4–37.4)
MCV RBC AUTO: 92 FL (ref 82–98)
PLATELET # BLD AUTO: 174 THOUSANDS/UL (ref 149–390)
PMV BLD AUTO: 8.7 FL (ref 8.9–12.7)
POTASSIUM SERPL-SCNC: 4 MMOL/L (ref 3.5–5.3)
RBC # BLD AUTO: 3.87 MILLION/UL (ref 3.81–5.12)
SODIUM SERPL-SCNC: 137 MMOL/L (ref 135–147)
WBC # BLD AUTO: 5.23 THOUSAND/UL (ref 4.31–10.16)

## 2024-04-30 PROCEDURE — 80048 BASIC METABOLIC PNL TOTAL CA: CPT

## 2024-04-30 PROCEDURE — 85027 COMPLETE CBC AUTOMATED: CPT

## 2024-04-30 PROCEDURE — 99024 POSTOP FOLLOW-UP VISIT: CPT | Performed by: PHYSICIAN ASSISTANT

## 2024-04-30 PROCEDURE — 97535 SELF CARE MNGMENT TRAINING: CPT

## 2024-04-30 PROCEDURE — NC001 PR NO CHARGE: Performed by: PHYSICIAN ASSISTANT

## 2024-04-30 PROCEDURE — 97530 THERAPEUTIC ACTIVITIES: CPT | Performed by: PHYSICAL THERAPIST

## 2024-04-30 RX ORDER — KETOROLAC TROMETHAMINE 30 MG/ML
15 INJECTION, SOLUTION INTRAMUSCULAR; INTRAVENOUS EVERY 6 HOURS PRN
Status: DISCONTINUED | OUTPATIENT
Start: 2024-04-30 | End: 2024-05-01 | Stop reason: HOSPADM

## 2024-04-30 RX ORDER — LANOLIN ALCOHOL/MO/W.PET/CERES
3 CREAM (GRAM) TOPICAL
Status: DISCONTINUED | OUTPATIENT
Start: 2024-04-30 | End: 2024-05-01 | Stop reason: HOSPADM

## 2024-04-30 RX ADMIN — KETOROLAC TROMETHAMINE 15 MG: 30 INJECTION, SOLUTION INTRAMUSCULAR; INTRAVENOUS at 22:23

## 2024-04-30 RX ADMIN — CEFAZOLIN SODIUM 1000 MG: 1 SOLUTION INTRAVENOUS at 04:10

## 2024-04-30 RX ADMIN — DOCUSATE SODIUM 100 MG: 100 CAPSULE, LIQUID FILLED ORAL at 08:40

## 2024-04-30 RX ADMIN — OXYCODONE 2.5 MG: 5 TABLET ORAL at 02:25

## 2024-04-30 RX ADMIN — DOCUSATE SODIUM 100 MG: 100 CAPSULE, LIQUID FILLED ORAL at 17:43

## 2024-04-30 RX ADMIN — ACETAMINOPHEN 325MG 650 MG: 325 TABLET ORAL at 05:56

## 2024-04-30 RX ADMIN — METHOCARBAMOL 500 MG: 500 TABLET ORAL at 17:43

## 2024-04-30 RX ADMIN — OXYCODONE 5 MG: 5 TABLET ORAL at 11:56

## 2024-04-30 RX ADMIN — OXYCODONE 2.5 MG: 5 TABLET ORAL at 07:40

## 2024-04-30 RX ADMIN — ACETAMINOPHEN 325MG 650 MG: 325 TABLET ORAL at 19:16

## 2024-04-30 RX ADMIN — Medication 3 MG: at 22:23

## 2024-04-30 RX ADMIN — ACETAMINOPHEN 325MG 650 MG: 325 TABLET ORAL at 11:56

## 2024-04-30 RX ADMIN — GABAPENTIN 100 MG: 100 CAPSULE ORAL at 22:22

## 2024-04-30 RX ADMIN — GABAPENTIN 100 MG: 100 CAPSULE ORAL at 05:56

## 2024-04-30 RX ADMIN — METHOCARBAMOL 500 MG: 500 TABLET ORAL at 11:56

## 2024-04-30 RX ADMIN — METHOCARBAMOL 500 MG: 500 TABLET ORAL at 06:31

## 2024-04-30 RX ADMIN — ONDANSETRON 4 MG: 2 INJECTION INTRAMUSCULAR; INTRAVENOUS at 19:17

## 2024-04-30 RX ADMIN — SODIUM CHLORIDE 500 ML: 0.9 INJECTION, SOLUTION INTRAVENOUS at 10:14

## 2024-04-30 RX ADMIN — ENOXAPARIN SODIUM 40 MG: 40 INJECTION SUBCUTANEOUS at 22:22

## 2024-04-30 RX ADMIN — GABAPENTIN 100 MG: 100 CAPSULE ORAL at 15:17

## 2024-04-30 RX ADMIN — ONDANSETRON 4 MG: 2 INJECTION INTRAMUSCULAR; INTRAVENOUS at 12:36

## 2024-04-30 NOTE — PLAN OF CARE
Problem: OCCUPATIONAL THERAPY ADULT  Goal: Performs self-care activities at highest level of function for planned discharge setting.  See evaluation for individualized goals.  Description: Treatment Interventions: ADL retraining, Endurance training, Functional transfer training, Patient/family training, Equipment evaluation/education, Compensatory technique education, Continued evaluation, Energy conservation, Activityengagement          See flowsheet documentation for full assessment, interventions and recommendations.   Outcome: Progressing  Note: Limitation: Decreased ADL status, Decreased endurance, Decreased self-care trans, Decreased high-level ADLs  Prognosis: Good  Assessment: Pt seen for OT treatment session focusing on ADLs/IADLs, functional mobility, functional standing tolerance, functional transfers, patient education, continued evaluation. Pt greeted in bedside chair at start of session. Pt alert and cooperative throughout session. Pt with good sitting balance and fair - dynamic standing balance. Pt limited by lightheadedness and nausea with activity. Pt able to state 2/3 posterior hip precautions at start of session, requiring additional education on remaining 1/3 precautions with pt verbalizing understanding of education. Distributed THR Packet to pt which educates on 3/3 THPs, sitting safety, and adherence to THPs during ADLs and IADLs. Pt completed sit to stand with Nancy and use of armrests and RW during treatment today. BP in standin/61. Pt completed stand to sit with Nancy. Educated pt on available LH AE through verbal instructions and demonstration with pt reporting understanding of education. Pt completed LB dressing with use of LH AE seated in chair. Pt completed don of underwear and pants with reacher seated in chair then standing to pull over hips with Nancy and use of armrests and RW. Pt completed don of bra/shirt with supervision seated in chair. Pt educated on use of sock aid and  dressing stick to don/doff socks. Pt completed functional mobility in room with Nancy and use of RW. Pt reported feeling lightheaded, seated in staxi, BP taken: 108/64 with (+) lightheadedness and nausea. Attempted functional mobility in room after symptoms subsided, (+) lightheadedness again, BP taken seated in chair: 106/55. Nursing notified. Pt benefiting from max verbal cues to follow YONATAN precautions during session. Pt reports 8/10 pain and some dizziness. Pt's vitals include: drop in BP with activity, vitals above.     Pt ended session seated in bedside chair. Call bell and phone within reach. All needs met and pt reports no further questions at this time. Continue to recommend no post acute rehabilitation needs when medically cleared. OT will continue to follow pt on caseload.     Rehab Resource Intensity Level, OT: III (Minimum Resource Intensity)

## 2024-04-30 NOTE — OCCUPATIONAL THERAPY NOTE
Occupational Therapy Progress Note     Patient Name: Anisa Rowe  Today's Date: 4/30/2024  Problem List  Principal Problem:    Primary osteoarthritis of one hip, left       04/30/24 0907   OT Last Visit   OT Visit Date 04/30/24   Note Type   Note Type Treatment   Pain Assessment   Pain Assessment Tool 0-10   Pain Score 8   Pain Location/Orientation Orientation: Left;Location: Hip   Hospital Pain Intervention(s) Repositioned;Ambulation/increased activity;Elevated;Emotional support;Rest   Restrictions/Precautions   Weight Bearing Precautions Per Order Yes   LLE Weight Bearing Per Order WBAT   Braces or Orthoses Other (Comment)  (abudction pillow)   Other Precautions Chair Alarm;WBS;Fall Risk;Pain   Lifestyle   Autonomy Independent with all ADLs/IADLs, no AD use at baseline   Reciprocal Relationships Spouse Freddy (has parkinson's), friends and family is able to help post op   Service to Others Retired   Intrinsic Gratification Gardening   ADL   Where Assessed Chair   UB Dressing Assistance 5  Supervision/Setup   UB Dressing Deficit Setup;Verbal cueing;Supervision/safety   UB Dressing Comments Pt completed don of bra/shirt with supervision seated in chair.   LB Dressing Assistance 5  Supervision/Setup   LB Dressing Deficit Setup;Requires assistive device for steadying;Verbal cueing;Supervision/safety;Increased time to complete;Use of adaptive equipment   LB Dressing Comments Educated pt on available LH AE through verbal instructions and demonstration with pt reporting understanding of education. Pt completed LB dressing with use of LH AE seated in chair. Pt completed don of underwear and pants with reacher seated in chair then standing to pull over hips with Nancy and use of armrests and RW.   Functional Standing Tolerance   Time ~1-2 min   Activity standing for LB dressing to pull over waist   Comments RW   Bed Mobility   Supine to Sit Unable to assess   Sit to Supine Unable to assess   Additional Comments verbal cues  for hand placement and proper technqiue with YONATAN precautions   Transfers   Sit to Stand 4  Minimal assistance   Additional items Assist x 1;Armrests;Increased time required;Verbal cues  (RW)   Stand to Sit 4  Minimal assistance   Additional items Assist x 1;Armrests;Increased time required;Verbal cues  (RW)   Additional Comments cues for hand placement and safety; VITALS: standin/61, in staxi after functional mobility: 108/64 (+) lightheadedness and nausea nursing notified. in chair at end of session: 106/55   Functional Mobility   Functional Mobility 4  Minimal assistance   Additional Comments minAx1 short functional distance in room and hallway with RW   Additional items Rolling walker   Cognition   Overall Cognitive Status WFL   Arousal/Participation Alert;Cooperative   Attention Attends with cues to redirect   Orientation Level Oriented X4   Memory Within functional limits   Following Commands Follows one step commands without difficulty   Comments Cooperative and motivated   Activity Tolerance   Activity Tolerance Patient limited by fatigue;Patient limited by pain;Treatment limited secondary to medical complications (Comment)  (lightheadedness)   Medical Staff Made Aware RN C, PT Ali, Pt seen for treatment with skilled Physical Therapy due to clinically unstable presentation, medical complexity, fall risk, functional balance, limited activity tolerance which is a decline from PLOF and may impact overall safety.   Assessment   Assessment Pt seen for OT treatment session focusing on ADLs/IADLs, functional mobility, functional standing tolerance, functional transfers, patient education, continued evaluation. Pt greeted in bedside chair at start of session. Pt alert and cooperative throughout session. Pt with good sitting balance and fair - dynamic standing balance. Pt limited by lightheadedness and nausea with activity. Pt able to state 2/3 posterior hip precautions at start of session, requiring additional  education on remaining 1/3 precautions with pt verbalizing understanding of education. Distributed THR Packet to pt which educates on 3/3 THPs, sitting safety, and adherence to THPs during ADLs and IADLs. Pt completed sit to stand with Nancy and use of armrests and RW during treatment today. BP in standin/61. Pt completed stand to sit with Nancy. Educated pt on available LH AE through verbal instructions and demonstration with pt reporting understanding of education. Pt completed LB dressing with use of LH AE seated in chair. Pt completed don of underwear and pants with reacher seated in chair then standing to pull over hips with Nancy and use of armrests and RW. Pt completed don of bra/shirt with supervision seated in chair. Pt educated on use of sock aid and dressing stick to don/doff socks. Pt completed functional mobility in room with Nancy and use of RW. Pt reported feeling lightheaded, seated in staxi, BP taken: 108/64 with (+) lightheadedness and nausea. Attempted functional mobility in room after symptoms subsided, (+) lightheadedness again, BP taken seated in chair: 106/55. Nursing notified. Pt benefiting from max verbal cues to follow YONATAN precautions during session. Pt reports 8/10 pain and some dizziness. Pt's vitals include: drop in BP with activity, vitals above.     Pt ended session seated in bedside chair. Call bell and phone within reach. All needs met and pt reports no further questions at this time. Continue to recommend no post acute rehabilitation needs when medically cleared. OT will continue to follow pt on caseload.   Plan   Treatment Interventions ADL retraining;Functional transfer training;Endurance training;Patient/family training;Equipment evaluation/education;Compensatory technique education;Continued evaluation;Energy conservation;Activityengagement   Goal Expiration Date 24   OT Treatment Day 1   OT Frequency 3-5x/wk  (BID prn)   Discharge Recommendation   Rehab Resource  Intensity Level, OT III (Minimum Resource Intensity)   Additional Comments  The patient's raw score on the -PAC Daily Activity Inpatient Short Form is 21. A raw score of greater than or equal to 19 suggests the patient may benefit from discharge to home. Please refer to the recommendation of the Occupational Therapist for safe discharge planning.   AM-PAC Daily Activity Inpatient   Lower Body Dressing 3   Bathing 3   Toileting 3   Upper Body Dressing 4   Grooming 4   Eating 4   Daily Activity Raw Score 21   Daily Activity Standardized Score (Calc for Raw Score >=11) 44.27   -PAC Applied Cognition Inpatient   Following a Speech/Presentation 4   Understanding Ordinary Conversation 4   Taking Medications 4   Remembering Where Things Are Placed or Put Away 4   Remembering List of 4-5 Errands 4   Taking Care of Complicated Tasks 4   Applied Cognition Raw Score 24   Applied Cognition Standardized Score 62.21   End of Consult   Education Provided Yes   Patient Position at End of Consult Bedside chair;Bed/Chair alarm activated;All needs within reach   Nurse Communication Nurse aware of consult   End of Consult Comments Pt seated OOB in chair with chair alarm activated at end of session. Call bell and phone within reach. All needs met and pt reports no further questions for OT at this time.   Hamida Myrick, OT

## 2024-04-30 NOTE — PHYSICAL THERAPY NOTE
PT PROGRESS NOTE    Name: Anisa Rowe  AGE: 73 y.o.  MRN: [id number]  LENGTH OF STAY: 1       04/30/24 0937   PT Last Visit   PT Visit Date 04/30/24   Note Type   Note Type Treatment   Pain Assessment   Pain Assessment Tool 0-10   Pain Score 8   Pain Location/Orientation Orientation: Left;Location: Hip   Hospital Pain Intervention(s) Repositioned;Ambulation/increased activity;Elevated;Emotional support;Rest   Precautions   Total Hip Replacement   (post. hip precautions: hip abduction pillow)   Restrictions/Precautions   Weight Bearing Precautions Per Order Yes   LLE Weight Bearing Per Order WBAT   Braces or Orthoses Other (Comment)  (hip abd. pillow)   Other Precautions Pain;Fall Risk;Multiple lines;THR;WBS;Chair Alarm;Bed Alarm   General   Chart Reviewed Yes   Response to Previous Treatment Patient with no complaints from previous session.   Family/Caregiver Present No   Cognition   Overall Cognitive Status WFL   Arousal/Participation Alert   Attention Attends with cues to redirect   Orientation Level Oriented X4   Following Commands Follows one step commands without difficulty   Subjective   Subjective I am hurting a lot   Bed Mobility   Supine to Sit Unable to assess   Sit to Supine Unable to assess   Additional Comments pt greeted in recliner chair at start of session   Transfers   Sit to Stand 4  Minimal assistance   Additional items Assist x 1;Increased time required;Verbal cues;Armrests;Impulsive  (RW)   Stand to Sit 5  Supervision   Additional items Assist x 1;Increased time required;Impulsive;Verbal cues  (RW)   Additional Comments cues for RW safety and impulsivity in standing. Poor carryover for post. hip precautions. PT reviewed multiples times t/o session   Ambulation/Elevation   Gait pattern Improper Weight shift;Antalgic;Forward Flexion;Decreased L stance;Excessively slow;Step to;Short stride;Decreased heel strike;Decreased toe off   Gait Assistance 4  Minimal assist   Additional items Assist x  1;Verbal cues   Assistive Device Rolling walker   Distance 5'x2   Ambulation/Elevation Additional Comments (S)  cues for LE sequencing during gait and RW safety. Poor carryover with post. hip precautions when performing turns with RW. Pt (+) for lightheadedness and nausea during ambulation. standing /61. seated in staxi following symptoms, 108/64. symptoms improved after a few minutes of rest, BP in recliner chair 106/55 after more ambulation, (+) for nausea.   Balance   Static Sitting Fair +   Dynamic Sitting Fair   Static Standing Fair -   Dynamic Standing Poor +   Ambulatory Poor +   Endurance Deficit   Endurance Deficit Yes   Endurance Deficit Description fatigue and pain   Activity Tolerance   Activity Tolerance Patient limited by fatigue;Patient limited by pain;Other (Comment)  (low BP, (+) nauea and lightheadedness during activity)   Medical Staff Made Aware RAE Mei RN C   Nurse Made Aware yes   Assessment   Prognosis Good   Problem List Decreased strength;Decreased range of motion;Decreased endurance;Impaired balance;Decreased mobility;Orthopedic restrictions;Pain   Assessment Patient was seen today per POC. Pt was greeted in recliner chair at start of session. Pain levels high, stated 8/10 pain at start of session. Pt was able to perform sit<>stand with MinAx1 and RW. She needed cures for RW safety and impulsivity when in standing. Pt had poor carryover with posterior hip precautions, reviewed with patient multiple times t/o session.  Patient was able to amb 5'x2 with minAx1 and RW. Pt was lightheaded and nauseous after a few steps and needed to sit and rest. Symtpoms resolved, so got patient back to recliner chair, but stated (+) nausea following increased activity. BP taken t/o session, please see flowsheet for objective data. Not approapriate to trial stairs at this time. RN updated/aware, will continue to see pt per POC as tolerated.  From PT standpoint continued recommendation for home with HHPT  at D/C when medically cleared based on nursing. (PENDING PROGRESS) Pt is primary cargiver to  who has Parkinson's. Pt requesting HHPT due to  not being able to be left alone for long periods. Nsg staff to continue to mobilized pt (OOB in chair for all meals & ambulate in room/unit) as tolerated to prevent further decline in function. Nsg staff notified.  Based on pt presentation and impaired function, pt would benefit from level III, (minimal resource intensity) at D/C.   Barriers to Discharge Decreased caregiver support;Inaccessible home environment  (COLT, caregiver to her )   Goals   Patient Goals to have less pain   STG Expiration Date 05/06/24   Short Term Goal #1 1). Pt will perform bed mobility with Alireza demonstrating appropriate technique 100% of the time in order to improve function. 2) Perform all transfers with Alireza demonstrating safe and appropriate technique 100% of the time in order to improve ability to negotiate safely in home environment.3) Amb with least restrictive AD > 200'x1 with Alireza in order to demonstrate ability to negotiate in home environment.4) Improve overall strength and balance 1/2 grade in order to optimize ability to perform functional tasks and reduce fall risk.5) Increase activity tolerance to 45 minutes in order to improve endurance to functional tasks.6) Negotiate stairs using most appropriate technique and Alireza in order to be able to negotiate safely in home environment. 7) PT for ongoing patient and family/caregiver education, DME needs and d/c planning in order to promote highest level of function in least restrictive environment.   PT Treatment Day 1   Plan   Treatment/Interventions Functional transfer training;LE strengthening/ROM;Elevations;Therapeutic exercise;Endurance training;Bed mobility;Gait training;Spoke to nursing;Spoke to case management;OT;Family   Progress Slow progress, medical status limitations   PT Frequency Twice a day  (prn)   Discharge  Recommendation   Rehab Resource Intensity Level, PT III (Minimum Resource Intensity)   Equipment Recommended Walker  (pt owns)   Additional Comments The patient's AM-PAC Basic Mobility Inpatient Short Form Raw Score is 17. A Raw score of greater than 16 suggests the patient may benefit from discharge to home. Please also refer to the recommendation of the Physical Therapist for safe discharge planning.   AM-PAC Basic Mobility Inpatient   Turning in Flat Bed Without Bedrails 3   Lying on Back to Sitting on Edge of Flat Bed Without Bedrails 3   Moving Bed to Chair 3   Standing Up From Chair Using Arms 3   Walk in Room 3   Climb 3-5 Stairs With Railing 2   Basic Mobility Inpatient Raw Score 17   Basic Mobility Standardized Score 39.67   [facility] Highest Level Of Mobility   -HLM Goal 5: Stand one or more mins   -HLM Achieved 6: Walk 10 steps or more   Education   Education Provided Precautions for total hip arthroplasty (YONATAN);Mobility training   Patient Reinforcement needed   End of Consult   Patient Position at End of Consult Bedside chair;Bed/Chair alarm activated;All needs within reach   End of Consult Comments Pt left in reclincer chair at end of session, RN updated/aware     Ann Marie Ramon, PT

## 2024-04-30 NOTE — QUICK NOTE
74 yo POD 1 s/p left YONATAN. Patient is doing well postop. No new complaints at this time. No incidents overnight.     Blood pressure 130/69, pulse 89, temperature 98.4 °F (36.9 °C), resp. rate 18, SpO2 95%.  Recent Labs     04/30/24  0552   WBC 5.23   HGB 11.8      SODIUM 137   K 4.0      CO2 26   BUN 13   CREATININE 0.64   GLUC 122   CALCIUM 8.5     Randee Stacy PA-C

## 2024-04-30 NOTE — PLAN OF CARE
Problem: PAIN - ADULT  Goal: Verbalizes/displays adequate comfort level or baseline comfort level  Description: Interventions:  - Encourage patient to monitor pain and request assistance  - Assess pain using appropriate pain scale  - Administer analgesics based on type and severity of pain and evaluate response  - Implement non-pharmacological measures as appropriate and evaluate response  - Consider cultural and social influences on pain and pain management  - Notify physician/advanced practitioner if interventions unsuccessful or patient reports new pain  Outcome: Progressing     Problem: SAFETY ADULT  Goal: Patient will remain free of falls  Description: INTERVENTIONS:  - Educate patient/family on patient safety including physical limitations  - Instruct patient to call for assistance with activity   - Consult OT/PT to assist with strengthening/mobility   - Keep Call bell within reach  - Keep bed low and locked with side rails adjusted as appropriate  - Keep care items and personal belongings within reach  - Initiate and maintain comfort rounds  - Make Fall Risk Sign visible to staff  - Initiate/Maintain bed/chair alarm  - Obtain necessary fall risk management equipment:   - Apply yellow socks and bracelet for high fall risk patients  - Consider moving patient to room near nurses station  Outcome: Progressing     Problem: SAFETY ADULT  Goal: Maintain or return to baseline ADL function  Description: INTERVENTIONS:  -  Assess patient's ability to carry out ADLs; assess patient's baseline for ADL function and identify physical deficits which impact ability to perform ADLs (bathing, care of mouth/teeth, toileting, grooming, dressing, etc.)  - Assess/evaluate cause of self-care deficits   - Assess range of motion  - Assess patient's mobility; develop plan if impaired  - Assess patient's need for assistive devices and provide as appropriate  - Encourage maximum independence but intervene and supervise when  necessary  - Involve family in performance of ADLs  - Assess for home care needs following discharge   - Consider OT consult to assist with ADL evaluation and planning for discharge  - Provide patient education as appropriate  Outcome: Progressing     Problem: DISCHARGE PLANNING  Goal: Discharge to home or other facility with appropriate resources  Description: INTERVENTIONS:  - Identify barriers to discharge w/patient and caregiver  - Arrange for needed discharge resources and transportation as appropriate  - Identify discharge learning needs (meds, wound care, etc.)  - Arrange for interpretive services to assist at discharge as needed  - Refer to Case Management Department for coordinating discharge planning if the patient needs post-hospital services based on physician/advanced practitioner order or complex needs related to functional status, cognitive ability, or social support system  Outcome: Progressing     Problem: Knowledge Deficit  Goal: Patient/family/caregiver demonstrates understanding of disease process, treatment plan, medications, and discharge instructions  Description: Complete learning assessment and assess knowledge base.  Interventions:  - Provide teaching at level of understanding  - Provide teaching via preferred learning methods  Outcome: Progressing

## 2024-04-30 NOTE — PLAN OF CARE
Problem: PAIN - ADULT  Goal: Verbalizes/displays adequate comfort level or baseline comfort level  Description: Interventions:  - Encourage patient to monitor pain and request assistance  - Assess pain using appropriate pain scale  - Administer analgesics based on type and severity of pain and evaluate response  - Implement non-pharmacological measures as appropriate and evaluate response  - Consider cultural and social influences on pain and pain management  - Notify physician/advanced practitioner if interventions unsuccessful or patient reports new pain  Outcome: Progressing     Problem: INFECTION - ADULT  Goal: Absence or prevention of progression during hospitalization  Description: INTERVENTIONS:  - Assess and monitor for signs and symptoms of infection  - Monitor lab/diagnostic results  - Monitor all insertion sites, i.e. indwelling lines, tubes, and drains  - Monitor endotracheal if appropriate and nasal secretions for changes in amount and color  - [place] appropriate cooling/warming therapies per order  - Administer medications as ordered  - Instruct and encourage patient and family to use good hand hygiene technique  - Identify and instruct in appropriate isolation precautions for identified infection/condition  Outcome: Progressing     Problem: SAFETY ADULT  Goal: Patient will remain free of falls  Description: INTERVENTIONS:  - Educate patient/family on patient safety including physical limitations  - Instruct patient to call for assistance with activity   - Consult OT/PT to assist with strengthening/mobility   - Keep Call bell within reach  - Keep bed low and locked with side rails adjusted as appropriate  - Keep care items and personal belongings within reach  - Initiate and maintain comfort rounds  - Make Fall Risk Sign visible to staff  - Offer Toileting every 2 Hours, in advance of need  - Initiate/Maintain bed/chair alarm  - Obtain necessary fall risk management equipment: rolling walker  -  Apply yellow socks and bracelet for high fall risk patients  - Consider moving patient to room near nurses station  Outcome: Progressing  Goal: Maintain or return to baseline ADL function  Description: INTERVENTIONS:  -  Assess patient's ability to carry out ADLs; assess patient's baseline for ADL function and identify physical deficits which impact ability to perform ADLs (bathing, care of mouth/teeth, toileting, grooming, dressing, etc.)  - Assess/evaluate cause of self-care deficits   - Assess range of motion  - Assess patient's mobility; develop plan if impaired  - Assess patient's need for assistive devices and provide as appropriate  - Encourage maximum independence but intervene and supervise when necessary  - Involve family in performance of ADLs  - Assess for home care needs following discharge   - Consider OT consult to assist with ADL evaluation and planning for discharge  - Provide patient education as appropriate  Outcome: Progressing  Goal: Maintains/Returns to pre admission functional level  Description: INTERVENTIONS:  - Perform AM-PAC 6 Click Basic Mobility/ Daily Activity assessment daily.  - Set and communicate daily mobility goal to care team and patient/family/caregiver.   - Collaborate with rehabilitation services on mobility goals if consulted  - Perform Range of Motion 3 times a day.  - Reposition patient every 2 hours.  - Dangle patient 3 times a day  - Stand patient 3 times a day  - Ambulate patient 3 times a day  - Out of bed to chair 3 times a day   - Out of bed for meals 3 times a day  - Out of bed for toileting  - Record patient progress and toleration of activity level   Outcome: Progressing     Problem: DISCHARGE PLANNING  Goal: Discharge to home or other facility with appropriate resources  Description: INTERVENTIONS:  - Identify barriers to discharge w/patient and caregiver  - Arrange for needed discharge resources and transportation as appropriate  - Identify discharge learning  needs (meds, wound care, etc.)  - Arrange for interpretive services to assist at discharge as needed  - Refer to Case Management Department for coordinating discharge planning if the patient needs post-hospital services based on physician/advanced practitioner order or complex needs related to functional status, cognitive ability, or social support system  Outcome: Progressing     Problem: Knowledge Deficit  Goal: Patient/family/caregiver demonstrates understanding of disease process, treatment plan, medications, and discharge instructions  Description: Complete learning assessment and assess knowledge base.  Interventions:  - Provide teaching at level of understanding  - Provide teaching via preferred learning methods  Outcome: Progressing

## 2024-04-30 NOTE — PROGRESS NOTES
Post Op Check Note -Surgery PA  Anisa Rowe 73 y.o. female MRN: [id number]  Unit/Bed#: WE 2 N -01 Encounter: [id number]      Subjective:    74 yo s/p left YONATAN. Patient is doing well postop. Pain is controlled. Denies dizziness, SOB, or N/V.     Objective:     Vitals:    04/29/24 2000   BP: 121/62   Pulse: 80   Resp: 18   Temp: 97.8 °F (36.6 °C)   SpO2: 96%     Physical Exam:  General: Alert and oriented x 4  CV: RRR; no murmurs, gallops, or rubs  Resp: No SOB; lungs CTA - no wheezes, rales or rhonchi  Abd: BS x 4 quadrants, soft and nontender    Left lower extremity:  Dressings C/D/I  Toes warm and well perfused  Motor and sensation grossly intact distally  2+ DP Pulse    Labs:  0   Lab Value Date/Time    HCT 42.3 04/10/2024 1139    HCT 42.3 12/29/2021 2359    HGB 14.5 04/10/2024 1139    HGB 14.2 12/29/2021 2359    INR 0.99 04/10/2024 1139    WBC 5.75 04/10/2024 1139    WBC 2.96 (L) 12/29/2021 2359     Meds:    Current Facility-Administered Medications:     acetaminophen (TYLENOL) tablet 650 mg, 650 mg, Oral, Q6H PRN, Piedad Mena PA-C, 650 mg at 04/29/24 1445    calcium carbonate (TUMS) chewable tablet 1,000 mg, 1,000 mg, Oral, Daily PRN, Piedad Mena PA-C    ceFAZolin (ANCEF) IVPB (premix in dextrose) 1,000 mg 50 mL, 1,000 mg, Intravenous, Q8H, Piedad Mena PA-C, Last Rate: 100 mL/hr at 04/29/24 2000, 1,000 mg at 04/29/24 2000    docusate sodium (COLACE) capsule 100 mg, 100 mg, Oral, BID, Piedad Mena PA-C, 100 mg at 04/29/24 1719    enoxaparin (LOVENOX) subcutaneous injection 40 mg, 40 mg, Subcutaneous, Daily, Piedad Mena PA-C    gabapentin (NEURONTIN) capsule 100 mg, 100 mg, Oral, Q8H, Piedad Mena PA-C    HYDROmorphone (DILAUDID) injection 0.5 mg, 0.5 mg, Intravenous, Q2H PRN, Piedad Mena PA-C    lactated ringers bolus 1,000 mL, 1,000 mL, Intravenous, Once PRN **AND** lactated ringers bolus 1,000 mL, 1,000 mL, Intravenous, Once PRN, HUE Mak-DAQUAN    lactated ringers infusion, 125 mL/hr,  Intravenous, Continuous, HUE Mak-C, Last Rate: 125 mL/hr at 04/29/24 1432, 125 mL/hr at 04/29/24 1432    lactated ringers infusion, 125 mL/hr, Intravenous, Continuous, HUE Mak-C, Last Rate: 125 mL/hr at 04/29/24 1125, Continue from Pre-op at 04/29/24 1125    methocarbamol (ROBAXIN) tablet 500 mg, 500 mg, Oral, Q6H JAYSHREE, HUE Mak-C, 500 mg at 04/29/24 1719    ondansetron (ZOFRAN) injection 4 mg, 4 mg, Intravenous, Q6H PRN, Piedad Mena PA-C    oxyCODONE (ROXICODONE) IR tablet 2.5 mg, 2.5 mg, Oral, Q4H PRN, HUE Mak-DAQUAN    oxyCODONE (ROXICODONE) IR tablet 5 mg, 5 mg, Oral, Q4H PRN, HUE Mak-C, 5 mg at 04/29/24 1445    promethazine (PHENERGAN) 25 mg/mL injection **ADS Override Pull**, , , ,     sodium chloride 0.9 % bolus 1,000 mL, 1,000 mL, Intravenous, Once PRN **AND** sodium chloride 0.9 % bolus 1,000 mL, 1,000 mL, Intravenous, Once PRN, Piedad Mena PA-C    Assessment/Plan:   Assessment:   73 y.o.female post operative day 0 left total hip arthroplasty. Doing well. Pain controlled.    Plan:  Incentive spirometry  Posterior YONATAN precautions  Weight Bearing as tolerated LLE  Up and out of bed with walker  DVT prophylaxis - SCDs and Lovenox   Analgesics and ice  PT/OT  Will continue to assess for acute blood loss anemia    Randee Stacy PA-C

## 2024-04-30 NOTE — PLAN OF CARE
Problem: PHYSICAL THERAPY ADULT  Goal: Performs mobility at highest level of function for planned discharge setting.  See evaluation for individualized goals.  Description: Treatment/Interventions: Functional transfer training, LE strengthening/ROM, Therapeutic exercise, Elevations, Endurance training, Bed mobility, Gait training, Spoke to nursing, Spoke to case management, OT  Equipment Recommended: Walker (pt owns)       See flowsheet documentation for full assessment, interventions and recommendations.  Note: Prognosis: Good  Problem List: Decreased strength, Decreased range of motion, Decreased endurance, Impaired balance, Decreased mobility, Orthopedic restrictions, Pain  Assessment: Patient was seen today per POC. Pt was greeted in recliner chair at start of session. Pain levels high, stated 8/10 pain at start of session. Pt was able to perform sit<>stand with MinAx1 and RW. She needed cures for RW safety and impulsivity when in standing. Pt had poor carryover with posterior hip precautions, reviewed with patient multiple times t/o session.  Patient was able to amb 5'x2 with minAx1 and RW. Pt was lightheaded and nauseous after a few steps and needed to sit and rest. Symtpoms resolved, so got patient back to recliner chair, but stated (+) nausea following increased activity. BP taken t/o session, please see flowsheet for objective data. Not approapriate to trial stairs at this time. RN updated/aware, will continue to see pt per POC as tolerated.  From PT standpoint continued recommendation for home with HHPT at D/C when medically cleared based on nursing. (PENDING PROGRESS) Pt is primary cargiver to  who has Parkinson's. Pt requesting HHPT due to  not being able to be left alone for long periods. Nsg staff to continue to mobilized pt (OOB in chair for all meals & ambulate in room/unit) as tolerated to prevent further decline in function. Nsg staff notified.  Based on pt presentation and impaired  function, pt would benefit from level III, (minimal resource intensity) at D/C.  Barriers to Discharge: Decreased caregiver support, Inaccessible home environment (COLT, caregiver to her )  Barriers to Discharge Comments: COLT, pt requesting HH servies following stay, she is the primary cargiver for  who has Parkinsons. Pt states that he needs assistance with dressing and cannot be left alone for long periods of time. CM consulted  Rehab Resource Intensity Level, PT: III (Minimum Resource Intensity)    See flowsheet documentation for full assessment.

## 2024-04-30 NOTE — PROGRESS NOTES
Progress Note - General Surgery   Anisa Rowe 73 y.o. female MRN: [id number]  Unit/Bed#: WE 2 N -01 Encounter: [id number]      Subjective:   74 yo POD 1 s/p left YONATAN. Patient was not cleared by PT for discharge. She is discouraged. She says her pain is better compared to this morning and says worse pain is her thigh. Her nausea is exacerbated by pain. She denies any SOB, dizziness.      Objective:     Blood pressure 148/93, pulse (!) 123, temperature 98.4 °F (36.9 °C), resp. rate 17, SpO2 97%.    Physical Exam:  General: Alert and oriented x 4  CV: RRR; no murmurs, gallops, or rubs  Resp: No SOB; lungs CTA - no wheezes, rales or rhonchi  Abd: BS x 4 quadrants, soft and nontender    Left lower extremity:  Dressings C/D/I  Toes warm and well perfused  Motor and sensation grossly intact distally  2+ DP Pulse  Thigh is swollen but compressible    Labs:  0   Lab Value Date/Time    HCT 35.6 04/30/2024 0552    HCT 42.3 04/10/2024 1139    HCT 42.3 12/29/2021 2359    HGB 11.8 04/30/2024 0552    HGB 14.5 04/10/2024 1139    HGB 14.2 12/29/2021 2359    INR 0.99 04/10/2024 1139    WBC 5.23 04/30/2024 0552    WBC 5.75 04/10/2024 1139    WBC 2.96 (L) 12/29/2021 2359     Meds:    Current Facility-Administered Medications:     acetaminophen (TYLENOL) tablet 650 mg, 650 mg, Oral, Q6H PRN, Piedad Mena PA-C, 650 mg at 04/30/24 1916    calcium carbonate (TUMS) chewable tablet 1,000 mg, 1,000 mg, Oral, Daily PRN, Piedad Mena PA-C    docusate sodium (COLACE) capsule 100 mg, 100 mg, Oral, BID, Piedad Mena PA-C, 100 mg at 04/30/24 1743    enoxaparin (LOVENOX) subcutaneous injection 40 mg, 40 mg, Subcutaneous, Daily, Piedad Mena PA-C, 40 mg at 04/29/24 2231    gabapentin (NEURONTIN) capsule 100 mg, 100 mg, Oral, Q8H, Piedad Mena PA-C, 100 mg at 04/30/24 1517    HYDROmorphone (DILAUDID) injection 0.5 mg, 0.5 mg, Intravenous, Q2H PRN, Piedad Mena PA-C    ketorolac (TORADOL) injection 15 mg, 15 mg, Intravenous, Q6H PRN, Randee HODGE  NEVAEH Stacy    lactated ringers infusion, 125 mL/hr, Intravenous, Continuous, LILIANA MakC, Stopped at 04/30/24 0632    lactated ringers infusion, 125 mL/hr, Intravenous, Continuous, Piedad Mena PA-C, Stopped at 04/30/24 0632    methocarbamol (ROBAXIN) tablet 500 mg, 500 mg, Oral, Q6H JAYSHREE, HUE Mak-C, 500 mg at 04/30/24 1743    ondansetron (ZOFRAN) injection 4 mg, 4 mg, Intravenous, Q6H PRN, HUE Mak-DAQUAN, 4 mg at 04/30/24 1917    oxyCODONE (ROXICODONE) IR tablet 2.5 mg, 2.5 mg, Oral, Q4H PRN, HUE Mak-C, 2.5 mg at 04/30/24 0740    oxyCODONE (ROXICODONE) IR tablet 5 mg, 5 mg, Oral, Q4H PRN, HUE Mak-C, 5 mg at 04/30/24 1156    promethazine (PHENERGAN) 25 mg/mL injection **ADS Override Pull**, , , ,     Assessment/Plan:   Assessment:   73 y.o.female post operative day 1 left total hip arthroplasty. Stable. Pain marginally controlled.    Plan:  Incentive spirometry  Weight Bearing as tolerated LLE  Up and out of bed with walker  DVT prophylaxis - SCDs and Lovenox  Posterior hip precautions  Analgesics and ice  PT/OT  Will continue to assess for acute blood loss anemia    Randee Stacy PA-C

## 2024-04-30 NOTE — PHYSICAL THERAPY NOTE
PT PROGRESS NOTE    Name: Anisa Rowe  AGE: 73 y.o.  MRN: [id number]  LENGTH OF STAY: 1   04/30/24 1515   PT Last Visit   PT Visit Date 04/30/24   Note Type   Note Type Treatment   Pain Assessment   Pain Assessment Tool 0-10   Pain Score 9   Pain Location/Orientation Orientation: Left;Location: Hip   Hospital Pain Intervention(s) Repositioned;Ambulation/increased activity;Elevated;Emotional support;Rest   Precautions   Total Hip Replacement   (hip abduction pillow)   Restrictions/Precautions   Weight Bearing Precautions Per Order Yes   LLE Weight Bearing Per Order WBAT   Braces or Orthoses Other (Comment)  (hip abduction pillow)   Other Precautions Fall Risk;Pain;THR;WBS;Bed Alarm;Chair Alarm  (post. hip precautions)   General   Chart Reviewed Yes   Additional Pertinent History Pt continues to have high pain levels   Response to Previous Treatment Patient reporting fatigue but able to participate.  (pt reporting high pain levels, but needs to use the bathroom)   Family/Caregiver Present No   Cognition   Overall Cognitive Status WFL   Attention Attends with cues to redirect   Orientation Level Oriented X4   Following Commands Follows one step commands with increased time or repetition   Subjective   Subjective I continue to have a lot of pain   Bed Mobility   Supine to Sit Unable to assess   Sit to Supine Unable to assess   Additional Comments pt greeted in recliner chair   Transfers   Sit to Stand 5  Supervision   Additional items Increased time required;Verbal cues  (RW)   Stand to Sit 5  Supervision   Additional items Verbal cues;Increased time required  (RW)   Toilet transfer 5  Supervision   Additional items Commode;Increased time required;Verbal cues  (commode over toilet, RW)   Additional Comments cues for RW safety and LE management. BP post acitivty 118/66 seated in recliner   Ambulation/Elevation   Gait pattern Improper Weight shift;Forward Flexion;Decreased L stance;Excessively slow;Step to;Short  stride;Decreased heel strike;Decreased toe off   Gait Assistance 5  Supervision   Additional items Verbal cues   Assistive Device Rolling walker   Distance 10'x2   Ambulation/Elevation Additional Comments cues for hand placement and LE management during gait. PT continued to remind pt of post. hip precautions t/o session   Balance   Static Sitting Good   Dynamic Sitting Fair +   Static Standing Fair   Dynamic Standing Fair -   Ambulatory Fair -   Endurance Deficit   Endurance Deficit Yes   Endurance Deficit Description fatigue and pain   Activity Tolerance   Activity Tolerance Patient limited by pain;Patient limited by fatigue   Medical Staff Made Aware RN C   Nurse Made Aware yes   Assessment   Prognosis Good   Problem List Decreased strength;Decreased range of motion;Decreased endurance;Impaired balance;Decreased mobility;Orthopedic restrictions;Pain   Assessment Patient was seen today per POC. Overall, pt demonstrated improved mobility, but continues to have high pain levels that are limiting her function. Pt was greeted in recliner chair at start of session. Pt able to perform sit<>stand from recliner and toilet with S and use of RW. Pt needed cues for LE management and posterior hip precautions.  Patient was able to amb 10'x2 with RW and S to the toilet. Pt left in recliner chair at end of session. BP post activity, 118/66. Pt's pain was a 9/10 during session and did not want to participate in any additional treatment. Will practice stair training during next session. Gait deviations as mentioned above, no reports of dizziness or nausea t/o session. Will continue to see pt per POC as tolerated.  From PT standpoint continued recommendation for HHPT at D/C when medically cleared based on nursing. Nsg staff to continue to mobilized pt (OOB in chair for all meals & ambulate in room/unit) as tolerated to prevent further decline in function. Nsg staff notified.  Based on pt presentation and impaired function, pt  would benefit from level III, (minimal resource intensity) at D/C.   Barriers to Discharge Decreased caregiver support;Inaccessible home environment   Barriers to Discharge Comments COLT, pt is primary caregiver to  who has parkinsons   Goals   Patient Goals to have less pain   STG Expiration Date 05/06/24   Short Term Goal #1 1). Pt will perform bed mobility with Alireza demonstrating appropriate technique 100% of the time in order to improve function. 2) Perform all transfers with Alireza demonstrating safe and appropriate technique 100% of the time in order to improve ability to negotiate safely in home environment.3) Amb with least restrictive AD > 200'x1 with Alireza in order to demonstrate ability to negotiate in home environment.4) Improve overall strength and balance 1/2 grade in order to optimize ability to perform functional tasks and reduce fall risk.5) Increase activity tolerance to 45 minutes in order to improve endurance to functional tasks.6) Negotiate stairs using most appropriate technique and Alireza in order to be able to negotiate safely in home environment. 7) PT for ongoing patient and family/caregiver education, DME needs and d/c planning in order to promote highest level of function in least restrictive environment.   PT Treatment Day 2   Plan   Treatment/Interventions Functional transfer training;LE strengthening/ROM;Elevations;Therapeutic exercise;Endurance training;Equipment eval/education;Bed mobility;Gait training;Spoke to nursing;Spoke to case management;OT   Progress Progressing toward goals   PT Frequency Twice a day  (prn)   Discharge Recommendation   Rehab Resource Intensity Level, PT III (Minimum Resource Intensity)   Equipment Recommended Walker  (pt owns)   Additional Comments The patient's AM-PAC Basic Mobility Inpatient Short Form Raw Score is 18. A Raw score of greater than 16 suggests the patient may benefit from discharge to home. Please also refer to the recommendation of the  Physical Therapist for safe discharge planning.   AM-PAC Basic Mobility Inpatient   Turning in Flat Bed Without Bedrails 3   Lying on Back to Sitting on Edge of Flat Bed Without Bedrails 3   Moving Bed to Chair 3   Standing Up From Chair Using Arms 3   Walk in Room 3   Climb 3-5 Stairs With Railing 3   Basic Mobility Inpatient Raw Score 18   Basic Mobility Standardized Score 41.05   [facility] Highest Level Of Mobility   -HLM Goal 6: Walk 10 steps or more   JH-HLM Achieved 6: Walk 10 steps or more   Education   Education Provided Mobility training;Precautions for total hip arthroplasty (YONATAN)   Patient Demonstrates acceptance/verbal understanding;Reinforcement needed   End of Consult   Patient Position at End of Consult Bedside chair;Bed/Chair alarm activated;All needs within reach   End of Consult Comments Pt stable, left in recliner chair with alarm on. RN updated/aware       Ann Marie Ramon, PT

## 2024-04-30 NOTE — PLAN OF CARE
Problem: PHYSICAL THERAPY ADULT  Goal: Performs mobility at highest level of function for planned discharge setting.  See evaluation for individualized goals.  Description: Treatment/Interventions: Functional transfer training, LE strengthening/ROM, Therapeutic exercise, Elevations, Endurance training, Bed mobility, Gait training, Spoke to nursing, Spoke to case management, OT  Equipment Recommended: Walker (pt owns)       See flowsheet documentation for full assessment, interventions and recommendations.  4/30/2024 1529 by Ann Marie Ramon PT  Note: Prognosis: Good  Problem List: Decreased strength, Decreased range of motion, Decreased endurance, Impaired balance, Decreased mobility, Orthopedic restrictions, Pain  Assessment: Patient was seen today per POC. Overall, pt demonstrated improved mobility, but continues to have high pain levels that are limiting her function. Pt was greeted in recliner chair at start of session. Pt able to perform sit<>stand from recliner and toilet with S and use of RW. Pt needed cues for LE management and posterior hip precautions.  Patient was able to amb 10'x2 with RW and S to the toilet. Pt left in recliner chair at end of session. BP post activity, 118/66. Pt's pain was a 9/10 during session and did not want to participate in any additional treatment. Will practice stair training during next session. Gait deviations as mentioned above, no reports of dizziness or nausea t/o session. Will continue to see pt per POC as tolerated.  From PT standpoint continued recommendation for HHPT at D/C when medically cleared based on nursing. Nsg staff to continue to mobilized pt (OOB in chair for all meals & ambulate in room/unit) as tolerated to prevent further decline in function. Nsg staff notified.  Based on pt presentation and impaired function, pt would benefit from level III, (minimal resource intensity) at D/C.  Barriers to Discharge: Decreased caregiver support, Inaccessible home  environment  Barriers to Discharge Comments: COLT, pt is primary caregiver to  who has parkinsons  Rehab Resource Intensity Level, PT: III (Minimum Resource Intensity)    See flowsheet documentation for full assessment.     4/30/2024 1303 by Ann Marie Ramon PT  Note: Prognosis: Good  Problem List: Decreased strength, Decreased range of motion, Decreased endurance, Impaired balance, Decreased mobility, Orthopedic restrictions, Pain  Assessment: Patient was seen today per POC. Pt was greeted in recliner chair at start of session. Pain levels high, stated 8/10 pain at start of session. Pt was able to perform sit<>stand with MinAx1 and RW. She needed cures for RW safety and impulsivity when in standing. Pt had poor carryover with posterior hip precautions, reviewed with patient multiple times t/o session.  Patient was able to amb 5'x2 with minAx1 and RW. Pt was lightheaded and nauseous after a few steps and needed to sit and rest. Symtpoms resolved, so got patient back to recliner chair, but stated (+) nausea following increased activity. BP taken t/o session, please see flowsheet for objective data. Not approapriate to trial stairs at this time. RN updated/aware, will continue to see pt per POC as tolerated.  From PT standpoint continued recommendation for home with HHPT at D/C when medically cleared based on nursing. (PENDING PROGRESS) Pt is primary cargiver to  who has Parkinson's. Pt requesting HHPT due to  not being able to be left alone for long periods. Nsg staff to continue to mobilized pt (OOB in chair for all meals & ambulate in room/unit) as tolerated to prevent further decline in function. Nsg staff notified.  Based on pt presentation and impaired function, pt would benefit from level III, (minimal resource intensity) at D/C.  Barriers to Discharge: Decreased caregiver support, Inaccessible home environment (COLT, caregiver to her )  Barriers to Discharge Comments: COLT, pt  requesting HH servies following stay, she is the primary cargiver for  who has Parkinsons. Pt states that he needs assistance with dressing and cannot be left alone for long periods of time. CM consulted  Rehab Resource Intensity Level, PT: III (Minimum Resource Intensity)    See flowsheet documentation for full assessment.

## 2024-04-30 NOTE — PROGRESS NOTES
Orthopedics   Anisa Rowe 73 y.o. female MRN: [id number]  Unit/Bed#: WE 2 N -01      Subjective:  73 y.o.female post operative day 1 left total hip arthroplasty. Pt doing well. Pain controlled.  Did not sleep well last night but no overnight events    Labs:  0   Lab Value Date/Time    HCT 35.6 04/30/2024 0552    HCT 42.3 04/10/2024 1139    HCT 42.3 12/29/2021 2359    HGB 11.8 04/30/2024 0552    HGB 14.5 04/10/2024 1139    HGB 14.2 12/29/2021 2359    INR 0.99 04/10/2024 1139    WBC 5.23 04/30/2024 0552    WBC 5.75 04/10/2024 1139    WBC 2.96 (L) 12/29/2021 2359       Meds:    Current Facility-Administered Medications:     acetaminophen (TYLENOL) tablet 650 mg, 650 mg, Oral, Q6H PRN, Piedad Mena, PA-C, 650 mg at 04/30/24 0556    calcium carbonate (TUMS) chewable tablet 1,000 mg, 1,000 mg, Oral, Daily PRN, Piedad Whiteno, PA-C    docusate sodium (COLACE) capsule 100 mg, 100 mg, Oral, BID, Piedad Ciano, PA-C, 100 mg at 04/29/24 1719    enoxaparin (LOVENOX) subcutaneous injection 40 mg, 40 mg, Subcutaneous, Daily, Piedad Cindyno, PA-C, 40 mg at 04/29/24 2231    gabapentin (NEURONTIN) capsule 100 mg, 100 mg, Oral, Q8H, Piedad Ciano, PA-C, 100 mg at 04/30/24 0556    HYDROmorphone (DILAUDID) injection 0.5 mg, 0.5 mg, Intravenous, Q2H PRN, Piedad Whiteno, PA-C    lactated ringers bolus 1,000 mL, 1,000 mL, Intravenous, Once PRN **AND** lactated ringers bolus 1,000 mL, 1,000 mL, Intravenous, Once PRN, Piedad Cindyno, PA-C    lactated ringers infusion, 125 mL/hr, Intravenous, Continuous, Piedad Mena, PA-C, Stopped at 04/30/24 0632    lactated ringers infusion, 125 mL/hr, Intravenous, Continuous, Piedad Mena PA-C, Stopped at 04/30/24 0632    methocarbamol (ROBAXIN) tablet 500 mg, 500 mg, Oral, Q6H JAYSHREE, Piedad Mena PA-C, 500 mg at 04/30/24 0631    ondansetron (ZOFRAN) injection 4 mg, 4 mg, Intravenous, Q6H PRN, Piedad Mena PA-C    oxyCODONE (ROXICODONE) IR tablet 2.5 mg, 2.5 mg, Oral, Q4H PRN, Piedad Mena PA-C, 2.5 mg at 04/30/24  0225    oxyCODONE (ROXICODONE) IR tablet 5 mg, 5 mg, Oral, Q4H PRN, Piedad Mena PA-C, 5 mg at 04/29/24 2227    promethazine (PHENERGAN) 25 mg/mL injection **ADS Override Pull**, , , ,     sodium chloride 0.9 % bolus 1,000 mL, 1,000 mL, Intravenous, Once PRN **AND** sodium chloride 0.9 % bolus 1,000 mL, 1,000 mL, Intravenous, Once PRN, Piedad Mena PA-C    Ins and Outs:  I/O last 24 hours:  In: 900 [I.V.:900]  Out: 1000 [Urine:1000]      Physical Exam:  Vitals:    04/30/24 0657   BP: 114/61   Pulse:    Resp: 18   Temp: 99.1 °F (37.3 °C)   SpO2:      Gen: AAOx3; NAD  Skin: warm and dry  EENT: EOMI, hearing intact, trachea midline  Lungs: NO audible wheeze or stridor  left lower extremity:  Dressings C/D/I  Sensation intact L2-S1  Motor intact L2-S1  2+ dorsalis pedis   Calf soft and nontender    _*_*_*_*_*_*_*_*_*_*_*_*_*_*_*_*_*_*_*_*_*_*_*_*_*_*_*_*_*_*_*_*_*_*_*_*_*_*_*_*_*    Assessment: 73 y.o.female post operative day 1 left total hip arthroplasty. Doing well    Plan:  Weight Bearing as tolerated - LLE  Up and out of bed with assist  Posterior total hip precautions  Abduction pillow while in bed  DVT prophylaxis - Lovenox and mechanical  Incentive spirometer  Analgesics  PT/OT  Discharge planned for today pending PT assessment - pt concerned and wants home PT  Patient noted to have acute blood loss anemia due to a drop in Hbg of > 2.0g from preop levels, will monitor vital signs and resuscitate with IV fluids as needed    Yi Alexander PA-C

## 2024-04-30 NOTE — CASE MANAGEMENT
Case Management Discharge Planning Note    Patient name Anisa Rowe  Location 2 N /WE 2 N * MRN [id number]  : 1950 Date 2024       Current Admission Date: 2024  Current Admission Diagnosis:Primary osteoarthritis of one hip, left   Patient Active Problem List    Diagnosis Date Noted    Primary osteoarthritis of one hip, left 2024    Incomplete uterovaginal prolapse 2020    Cystocele, midline 2020    Pelvic muscle wasting 2020    Other female genital prolapse 2020    Female bladder prolapse 01/10/2020    Mixed hyperlipidemia 01/10/2020    Primary insomnia 01/10/2020    Osteopenia 01/10/2020    Overweight (BMI 25.0-29.9) 01/10/2020      LOS (days): 0  Geometric Mean LOS (GMLOS) (days):   Days to GMLOS:     OBJECTIVE:            Current admission status: Outpatient Surgery   Preferred Pharmacy:   [id number] - [place] - [address]  [address]  [place] [address]  Phone: [phone] Fax: [phone]    Primary Care Provider: Samir Pradhan DO    Primary Insurance: [employer] REP  Secondary Insurance:     DISCHARGE DETAILS:    Discharge planning discussed with:: pt  Freedom of Choice: Yes  Comments - Freedom of Choice: Discussed available agencies near her home with pt choosing [facility] after speaking to her daughter  CM contacted family/caregiver?: Yes  Were Treatment Team discharge recommendations reviewed with patient/caregiver?: Yes  Did patient/caregiver verbalize understanding of patient care needs?: Yes            Requested Home Health Care         Is the patient interested in HHC at discharge?: Yes  Home Health Discipline requested:: Occupational Therapy, Physical Therapy  [facility]  [facility] External Referral Reason (only applicable if external HHA name selected): Services not provided in network or near patient location  Home Health Follow-Up Provider:: Referring Provider  [facility]  Health Services Needed:: Evaluate Functional Status and Safety, Gait/ADL Training, Restore Joint Function Post Joint Replacement, Strengthening/Theraputic Exercises to Improve Function  Homebound Criteria Met:: Requires the Assistance of Another Person for Safe Ambulation or to Leave the Home, Uses an Assist Device (i.e. cane, walker, etc)  Supporting Clincal Findings:: Limited Endurance, Fatigues Easliy in Short Distances                   Treatment Team Recommendation: Home with Home Health Care  Discharge Destination Plan:: Home with Home Health Care ([facility])  Transport at Discharge : Family, Auto with designated

## 2024-04-30 NOTE — PROGRESS NOTES
Patient:    MRN:  [id number]    Aidin Request ID:  [id number]    Level of care reserved:  Home Health Agency    Partner Reserved:  [facility], [place] [address] [phone]    Clinical needs requested:    Geography searched:  [id number]    Start of Service:    Request sent:  4:22pm EDT on 4/29/2024 by Franci Persaud    Partner reserved:  12:15pm EDT on 4/30/2024 by Franci Persaud    Choice list shared:  9:40am EDT on 4/30/2024 by Franci Persaud

## 2024-04-30 NOTE — UTILIZATION REVIEW
"Initial Clinical Review    OP PROCEDURE   4/29 CHANGED TO IP ADMISSION  4/30    Not  MEETING  PT GOALS.    Elective   OP   surgical procedure    Age/Sex: 73 y.o. female    Surgery Date:   4/29/24    Procedure: Left - ARTHROPLASTY HIP TOTAL     Anesthesia:  choice    Operative Findings:   Depuy               Cup-52mm metal              Liner-10 degree lipped poly              Head/neck-36 + 1.5mm metal              Femur-13 HO    POD#1 Progress Note:   4/30  IP ADMISSION  Continue post op care.  Continue pain control as needed.  Needs  PT/OT/WBAT  LLE.  Monitor labs.  Hemoglobin today 11.8.       Date: 5/1  Day 3: Has surpassed a 2nd midnight with active treatments and services.  D/C  home       Admission Orders: Date/Time/Statement:   Admission Orders (From admission, onward)       Ordered        04/30/24 1456  INPATIENT ADMISSION  Once                          Orders Placed This Encounter   Procedures    INPATIENT ADMISSION     Standing Status:   Standing     Number of Occurrences:   1     Order Specific Question:   Level of Care     Answer:   Med Surg [16]     Order Specific Question:   Estimated length of stay     Answer:   More than 2 Midnights     Order Specific Question:   Certification     Answer:   I certify that inpatient services are medically necessary for this patient for a duration of greater than two midnights. See H&P and MD Progress Notes for additional information about the patient's course of treatment.     Vital Signs: /78   Pulse (!) 112   Temp 99.1 °F (37.3 °C) (Oral)   Resp 18   Ht 5' 8\" (1.727 m)   Wt 74.8 kg (165 lb)   SpO2 98%   BMI 25.09 kg/m²     Pertinent Labs/Diagnostic Test Results:     Results from last 7 days   Lab Units 04/30/24  0552   WBC Thousand/uL 5.23   HEMOGLOBIN g/dL 11.8   HEMATOCRIT % 35.6   PLATELETS Thousands/uL 174         Results from last 7 days   Lab Units 04/30/24  0552   SODIUM mmol/L 137   POTASSIUM mmol/L 4.0   CHLORIDE mmol/L 104   CO2 mmol/L 26 "   ANION GAP mmol/L 7   BUN mg/dL 13   CREATININE mg/dL 0.64   EGFR ml/min/1.73sq m 88   CALCIUM mg/dL 8.5             Results from last 7 days   Lab Units 04/30/24  0552   GLUCOSE RANDOM mg/dL 122               Diet:  regular    Mobility:  PT/OT    DVT Prophylaxis:  SCD's    Medications/Pain Control:   Scheduled Medications:  docusate sodium, 100 mg, Oral, BID  enoxaparin, 40 mg, Subcutaneous, Daily  gabapentin, 100 mg, Oral, Q8H  methocarbamol, 500 mg, Oral, Q6H JAYSHREE  promethazine, , ,       Continuous IV Infusions:  lactated ringers, 125 mL/hr, Intravenous, Continuous  lactated ringers, 125 mL/hr, Intravenous, Continuous      PRN Meds:  acetaminophen, 650 mg, Oral, Q6H PRN  calcium carbonate, 1,000 mg, Oral, Daily PRN  HYDROmorphone, 0.5 mg, Intravenous, Q2H PRN  ondansetron, 4 mg, Intravenous, Q6H PRN  oxyCODONE, 2.5 mg, Oral, Q4H PRN  oxyCODONE, 5 mg, Oral, Q4H PRN  promethazine, , ,         Network Utilization Review Department  ATTENTION: Please call with any questions or concerns to [phone] and carefully listen to the prompts so that you are directed to the right person. All voicemails are confidential.   For Discharge needs, contact Care Management DC Support Team at [phone] opt. [phone]  Send all requests for admission clinical reviews, approved or denied determinations and any other requests to dedicated fax number below belonging to the campus where the patient is receiving treatment. List of dedicated fax numbers for the Facilities:  FACILITY NAME UR FAX NUMBER   ADMISSION DENIALS (Administrative/Medical Necessity) [phone]   DISCHARGE SUPPORT TEAM (NETWORK) [phone]   PARENT CHILD HEALTH (Maternity/NICU/Pediatrics) [phone]   [facility] [phone]   [facility] [phone]   [facility] [phone]   [facility] [phone]   Counts include 234 beds at the [facility] [phone]   [facility]  [facility] [phone]   [facility] [phone]   [facility] [phone]   [facility] [phone]   [facility] [phone]   [facility] [phone]   [facility] [phone]

## 2024-05-01 VITALS
OXYGEN SATURATION: 96 % | WEIGHT: 165 LBS | BODY MASS INDEX: 25.01 KG/M2 | TEMPERATURE: 98.7 F | HEIGHT: 68 IN | HEART RATE: 98 BPM | SYSTOLIC BLOOD PRESSURE: 120 MMHG | RESPIRATION RATE: 17 BRPM | DIASTOLIC BLOOD PRESSURE: 71 MMHG

## 2024-05-01 PROCEDURE — 99024 POSTOP FOLLOW-UP VISIT: CPT

## 2024-05-01 PROCEDURE — NC001 PR NO CHARGE

## 2024-05-01 PROCEDURE — 97535 SELF CARE MNGMENT TRAINING: CPT

## 2024-05-01 PROCEDURE — 97530 THERAPEUTIC ACTIVITIES: CPT | Performed by: PHYSICAL THERAPIST

## 2024-05-01 RX ADMIN — KETOROLAC TROMETHAMINE 15 MG: 30 INJECTION, SOLUTION INTRAMUSCULAR; INTRAVENOUS at 06:07

## 2024-05-01 RX ADMIN — ACETAMINOPHEN 325MG 650 MG: 325 TABLET ORAL at 03:09

## 2024-05-01 RX ADMIN — METHOCARBAMOL 500 MG: 500 TABLET ORAL at 09:10

## 2024-05-01 RX ADMIN — GABAPENTIN 100 MG: 100 CAPSULE ORAL at 06:07

## 2024-05-01 RX ADMIN — ACETAMINOPHEN 325MG 650 MG: 325 TABLET ORAL at 09:12

## 2024-05-01 RX ADMIN — METHOCARBAMOL 500 MG: 500 TABLET ORAL at 03:10

## 2024-05-01 NOTE — PHYSICAL THERAPY NOTE
PT PROGRESS NOTE    Name: Anisa Rowe  AGE: 73 y.o.  MRN: [id number]  LENGTH OF STAY: 2       05/01/24 1136   PT Last Visit   PT Visit Date 05/01/24   Note Type   Note Type Treatment   Pain Assessment   Pain Assessment Tool 0-10   Pain Score 2   Pain Location/Orientation Orientation: Left;Location: Hip   Patient's Stated Pain Goal No pain   Hospital Pain Intervention(s) Repositioned;Ambulation/increased activity;Elevated;Emotional support;Rest   Precautions   Total Hip Replacement Other (Comment)  (post. hip precautions)   Restrictions/Precautions   Weight Bearing Precautions Per Order Yes   LLE Weight Bearing Per Order WBAT   Braces or Orthoses Other (Comment)  (hip abduction pillow)   Other Precautions Pain;Fall Risk;THR;WBS;Bed Alarm;Chair Alarm   General   Chart Reviewed Yes   Response to Previous Treatment Patient with no complaints from previous session.   Family/Caregiver Present No   Cognition   Overall Cognitive Status WFL   Arousal/Participation Alert   Attention Within functional limits   Orientation Level Oriented X4   Following Commands Follows one step commands without difficulty   Comments Pt pleasant   Subjective   Subjective I am feeling better today   Bed Mobility   Supine to Sit Unable to assess   Sit to Supine Unable to assess   Additional Comments Pt greeted in recliner chair   Transfers   Sit to Stand 5  Supervision   Additional items Increased time required;Armrests;Verbal cues  (RW)   Stand to Sit 5  Supervision   Additional items Increased time required;Verbal cues  (RW)   Additional Comments cues for RW safety   Ambulation/Elevation   Gait pattern Improper Weight shift;Decreased L stance;Step to;Excessively slow;Decreased hip extension;Decreased heel strike;Decreased toe off   Gait Assistance 5  Supervision   Additional items Verbal cues   Assistive Device Rolling walker   Distance 120'x1   Stair Management Assistance 5  Supervision   Additional items Verbal cues;Increased time required    Stair Management Technique Two rails;Step to pattern;Foreward   Number of Stairs 10   Ambulation/Elevation Additional Comments cues for LE sequencing during stairs   Balance   Static Sitting Good   Dynamic Sitting Fair +   Static Standing Fair   Dynamic Standing Fair -   Ambulatory Fair -   Endurance Deficit   Endurance Deficit Yes   Endurance Deficit Description fatigue   Activity Tolerance   Activity Tolerance Patient tolerated treatment well;Patient limited by fatigue   Medical Staff Made Aware RN RAE Saldaña   Nurse Made Aware yes   Assessment   Prognosis Good   Problem List Decreased strength;Decreased range of motion;Decreased endurance;Impaired balance;Decreased mobility;Orthopedic restrictions;Pain   Assessment Patient was seen today per POC. Pt was greeted in recliner chair at start of session. Pt was able to perform sit<>stand with RW and S. Pt have better understanding of post. Hip precautions today, able to tell them to PT without assistance. Pt ambulated 120'x1 with S and RW. Pt able to perform 10 steps with BL rails and S. Pt needed cues for proper LE sequencing on stairs, but good carryover. BP in standing prior to activity, 120/71, (-) symptoms. Overall, pt demonstrated improved mobility and inc tolerance to activity. Pt was left in recliner chair at end of session, RN updated. Will continue to see pt per POC as tolerated.  From PT standpoint continued recommendation for home with HHPT at D/C when medically cleared based on nursing. Nsg staff to continue to mobilized pt (OOB in chair for all meals & ambulate in room/unit) as tolerated to prevent further decline in function. Nsg staff notified.  Based on pt presentation and impaired function, pt would benefit from level III, (minimal resource intensity) at D/C.   Barriers to Discharge Decreased caregiver support  (pt primary caregiver to )   Goals   Patient Goals to go home   STG Expiration Date 05/06/24   Short Term Goal #1 1). Pt will  perform bed mobility with Alireza demonstrating appropriate technique 100% of the time in order to improve function. 2) Perform all transfers with Alireza demonstrating safe and appropriate technique 100% of the time in order to improve ability to negotiate safely in home environment.3) Amb with least restrictive AD > 200'x1 with Alireza in order to demonstrate ability to negotiate in home environment.4) Improve overall strength and balance 1/2 grade in order to optimize ability to perform functional tasks and reduce fall risk.5) Increase activity tolerance to 45 minutes in order to improve endurance to functional tasks.6) Negotiate stairs using most appropriate technique and Alireza in order to be able to negotiate safely in home environment. 7) PT for ongoing patient and family/caregiver education, DME needs and d/c planning in order to promote highest level of function in least restrictive environment.   PT Treatment Day 3   Plan   Treatment/Interventions Functional transfer training;LE strengthening/ROM;Therapeutic exercise;Elevations;Endurance training;Bed mobility;Gait training;Spoke to nursing;Spoke to case management;OT   Progress Progressing toward goals   PT Frequency Twice a day  (prn)   Discharge Recommendation   Rehab Resource Intensity Level, PT III (Minimum Resource Intensity)   Equipment Recommended Walker  (pt owns)   Additional Comments The patient's AM-PAC Basic Mobility Inpatient Short Form Raw Score is 20. A Raw score of greater than 16 suggests the patient may benefit from discharge to home. Please also refer to the recommendation of the Physical Therapist for safe discharge planning.   AM-PAC Basic Mobility Inpatient   Turning in Flat Bed Without Bedrails 4   Lying on Back to Sitting on Edge of Flat Bed Without Bedrails 4   Moving Bed to Chair 3   Standing Up From Chair Using Arms 3   Walk in Room 3   Climb 3-5 Stairs With Railing 3   Basic Mobility Inpatient Raw Score 20   Basic Mobility Standardized  Score 43.99   [facility] Highest Level Of Mobility   -HLM Goal 6: Walk 10 steps or more   -HLM Achieved 7: Walk 25 feet or more   Education   Education Provided Mobility training;Home exercise program;Precautions for total hip arthroplasty (YONATAN)   Patient Demonstrates acceptance/verbal understanding   End of Consult   Patient Position at End of Consult Bedside chair;All needs within reach   End of Consult Comments Pt stable, left in recliner chair at end of session. RN updated       Ann Marie Ramon, PT

## 2024-05-01 NOTE — OCCUPATIONAL THERAPY NOTE
Pt seen for OT treatment session focusing on ADLs/IADLs, functional mobility, functional standing tolerance, functional transfers, patient education, continued evaluation. Pt greeted in bedside chair at start of session. Pt alert and cooperative throughout session. Pt with good sitting balance and fair- dynamic standing balance. Pt tolerated treatment well. Pt completed don of bra/shirt with supervision seated in chair. Educated pt on available LH AE through verbal instructions and demonstration with pt reporting understanding of education. Pt completed LB dressing with use of LH AE seated in chair. Pt don of pants with use of reacher. BP standin/71. Pt completed functional mobility from chair to hallway functional household distances with use of RW. Pt completed stand to sit into chair with supervision and use of armrests and RW. Pt educated on car transfers, LB dressing, and LB bathing for independence at home. Pt reports 2/10 pain and no dizziness. Pt's vitals include: stable.     Pt ended session seated in bedside chair. Call bell and phone within reach. All needs met and pt reports no further questions at this time. Continue to recommend no post acute rehabilitation needs when medically cleared. OT will continue to follow pt on caseload.       The patient's raw score on the AM-PAC Daily Activity Inpatient Short Form is 21. A raw score of {greater than/less than or equal to:13551} 19 suggests the patient may benefit from discharge to {home/post-acute rehab services:68562}. Please refer to the recommendation of the Occupational Therapist for safe discharge planning.    Pt seen for treatment with skilled Physical Therapy due to clinically unstable presentation, medical complexity, fall risk, functional balance, limited activity tolerance which is a decline from PLOF and may impact overall safety.

## 2024-05-01 NOTE — OCCUPATIONAL THERAPY NOTE
Occupational Therapy Progress Note     Patient Name: Anisa Rowe  Today's Date: 5/1/2024  Problem List  Principal Problem:    Primary osteoarthritis of one hip, left       05/01/24 1119   OT Last Visit   OT Visit Date 05/01/24   Note Type   Note Type Treatment   Pain Assessment   Pain Assessment Tool 0-10   Pain Score 2   Pain Location/Orientation Orientation: Left;Location: Hip   Hospital Pain Intervention(s) Repositioned;Ambulation/increased activity;Elevated;Emotional support;Rest   Restrictions/Precautions   Weight Bearing Precautions Per Order Yes   LLE Weight Bearing Per Order WBAT   Braces or Orthoses Other (Comment)  (hip abduction pillow)   Other Precautions Chair Alarm;WBS;THR;Fall Risk;Pain  (posterior hip precautions)   Lifestyle   Autonomy Independent with all ADLs/IADLs, no AD use at baseline   Reciprocal Relationships Spouse Freddy (has parkinson's), friends and family is able to help post op   Service to Others Retired   Intrinsic Gratification Gardening   ADL   Where Assessed Chair   UB Dressing Assistance 5  Supervision/Setup   UB Dressing Deficit Setup;Verbal cueing;Supervision/safety   UB Dressing Comments Pt completed don of bra/shirt with supervision seated in chair.   LB Dressing Assistance 5  Supervision/Setup   LB Dressing Deficit Setup;Verbal cueing;Supervision/safety;Increased time to complete;Use of adaptive equipment   LB Dressing Comments Educated pt on available LH AE through verbal instructions and demonstration with pt reporting understanding of education. Pt completed LB dressing with use of LH AE seated in chair.  Pt don of pants with use of reacher.   Functional Standing Tolerance   Time ~1-2 min   Activity standing for LB dressing to pull over waist   Comments RW   Bed Mobility   Supine to Sit Unable to assess   Sit to Supine Unable to assess   Additional Comments pt greeted in recliner at start of session   Transfers   Sit to Stand 5  Supervision   Additional items  Armrests;Increased time required;Verbal cues  (RW)   Stand to Sit 5  Supervision   Additional items Armrests;Increased time required;Verbal cues  (RW)   Additional Comments cues for RW safety and LE management   Functional Mobility   Functional Mobility 5  Supervision   Additional Comments supervision with RW and functional household distances   Additional items Rolling walker   Cognition   Overall Cognitive Status WFL   Arousal/Participation Alert   Attention Attends with cues to redirect   Orientation Level Oriented X4   Memory Within functional limits   Following Commands Follows one step commands with increased time or repetition   Comments Cooperative and pleasant, motivated to go home   Activity Tolerance   Activity Tolerance Patient tolerated treatment well   Medical Staff Made Aware RN Piper, PT Ali   Assessment   Assessment Pt seen for OT treatment session focusing on ADLs/IADLs, functional mobility, functional standing tolerance, functional transfers, patient education, continued evaluation. Pt greeted in bedside chair at start of session. Pt alert and cooperative throughout session. Pt with good sitting balance and fair- dynamic standing balance. Pt tolerated treatment well. Pt completed don of bra/shirt with supervision seated in chair. Educated pt on available LH AE through verbal instructions and demonstration with pt reporting understanding of education. Pt completed LB dressing with use of LH AE seated in chair. Pt don of pants with use of reacher. BP standin/71. Pt completed functional mobility from chair to hallway functional household distances with use of RW. Pt completed stand to sit into chair with supervision and use of armrests and RW. Pt educated on car transfers, LB dressing, and LB bathing for independence at home. Pt reports 2/10 pain and no dizziness. Pt's vitals include: stable.     Pt ended session seated in bedside chair. Call bell and phone within reach. All needs met and pt  reports no further questions at this time. Continue to recommend no post acute rehabilitation needs when medically cleared. OT will continue to follow pt on caseload.   Plan   Treatment Interventions ADL retraining;Functional transfer training;Endurance training;Equipment evaluation/education;Patient/family training;Compensatory technique education;Continued evaluation;Energy conservation;Activityengagement   Goal Expiration Date 05/06/24   OT Treatment Day 2   OT Frequency 3-5x/wk   Discharge Recommendation   Rehab Resource Intensity Level, OT No post-acute rehabilitation needs   Additional Comments  The patient's raw score on the AM-PAC Daily Activity Inpatient Short Form is 21 . A raw score of less than 19 suggests the patient may benefit from discharge to post-acute rehabilitation services. Please refer to the recommendation of the Occupational Therapist for safe discharge planning.   AM-PAC Daily Activity Inpatient   Lower Body Dressing 3   Bathing 3   Toileting 3   Upper Body Dressing 4   Grooming 4   Eating 4   Daily Activity Raw Score 21   Daily Activity Standardized Score (Calc for Raw Score >=11) 44.27   AM-PAC Applied Cognition Inpatient   Following a Speech/Presentation 4   Understanding Ordinary Conversation 4   Taking Medications 4   Remembering Where Things Are Placed or Put Away 4   Remembering List of 4-5 Errands 4   Taking Care of Complicated Tasks 4   Applied Cognition Raw Score 24   Applied Cognition Standardized Score 62.21   End of Consult   Education Provided Yes   Patient Position at End of Consult Bedside chair;All needs within reach   Nurse Communication Nurse aware of consult   End of Consult Comments Pt seated OOB in chair at end of session. Call bell and phone within reach. All needs met and pt reports no further questions for OT at this time.   Hamida Myrick, OT

## 2024-05-01 NOTE — QUICK NOTE
74 yo POD2 s/p left YONATAN. Patient good rest overnight and is feeling better today. She is more confident in her ambulation this morning.  No new complaints at this time. No incidents overnight.   Afeb VSS.  Dressing C/D/I; NV intact

## 2024-05-01 NOTE — PLAN OF CARE
Problem: PAIN - ADULT  Goal: Verbalizes/displays adequate comfort level or baseline comfort level  Description: Interventions:  - Encourage patient to monitor pain and request assistance  - Assess pain using appropriate pain scale  - Administer analgesics based on type and severity of pain and evaluate response  - Implement non-pharmacological measures as appropriate and evaluate response  - Consider cultural and social influences on pain and pain management  - Notify physician/advanced practitioner if interventions unsuccessful or patient reports new pain  5/1/2024 1159 by Piper Morris RN  Outcome: Adequate for Discharge  5/1/2024 0821 by Piper Morris RN  Outcome: Progressing     Problem: INFECTION - ADULT  Goal: Absence or prevention of progression during hospitalization  Description: INTERVENTIONS:  - Assess and monitor for signs and symptoms of infection  - Monitor lab/diagnostic results  - Monitor all insertion sites, i.e. indwelling lines, tubes, and drains  - Monitor endotracheal if appropriate and nasal secretions for changes in amount and color  - [place] appropriate cooling/warming therapies per order  - Administer medications as ordered  - Instruct and encourage patient and family to use good hand hygiene technique  - Identify and instruct in appropriate isolation precautions for identified infection/condition  Outcome: Adequate for Discharge     Problem: SAFETY ADULT  Goal: Patient will remain free of falls  Description: INTERVENTIONS:  - Educate patient/family on patient safety including physical limitations  - Instruct patient to call for assistance with activity   - Consult OT/PT to assist with strengthening/mobility   - Keep Call bell within reach  - Keep bed low and locked with side rails adjusted as appropriate  - Keep care items and personal belongings within reach  - Initiate and maintain comfort rounds  - Make Fall Risk Sign visible to staff  - Offer Toileting every 2 Hours, in  advance of need  - Initiate/Maintain chair alarm  - Obtain necessary fall risk management equipment:   - Apply yellow socks and bracelet for high fall risk patients  - Consider moving patient to room near nurses station  Outcome: Adequate for Discharge  Goal: Maintain or return to baseline ADL function  Description: INTERVENTIONS:  -  Assess patient's ability to carry out ADLs; assess patient's baseline for ADL function and identify physical deficits which impact ability to perform ADLs (bathing, care of mouth/teeth, toileting, grooming, dressing, etc.)  - Assess/evaluate cause of self-care deficits   - Assess range of motion  - Assess patient's mobility; develop plan if impaired  - Assess patient's need for assistive devices and provide as appropriate  - Encourage maximum independence but intervene and supervise when necessary  - Involve family in performance of ADLs  - Assess for home care needs following discharge   - Consider OT consult to assist with ADL evaluation and planning for discharge  - Provide patient education as appropriate  Outcome: Adequate for Discharge  Goal: Maintains/Returns to pre admission functional level  Description: INTERVENTIONS:  - Perform AM-PAC 6 Click Basic Mobility/ Daily Activity assessment daily.  - Set and communicate daily mobility goal to care team and patient/family/caregiver.   - Collaborate with rehabilitation services on mobility goals if consulted  - Perform Range of Motion 3 times a day.  - Reposition patient every 2 hours.  - Dangle patient 3 times a day  - Stand patient 3 times a day  - Ambulate patient 3 times a day  - Out of bed to chair 3 times a day   - Out of bed for meals 3 times a day  - Out of bed for toileting  - Record patient progress and toleration of activity level   Outcome: Adequate for Discharge     Problem: DISCHARGE PLANNING  Goal: Discharge to home or other facility with appropriate resources  Description: INTERVENTIONS:  - Identify barriers to  discharge w/patient and caregiver  - Arrange for needed discharge resources and transportation as appropriate  - Identify discharge learning needs (meds, wound care, etc.)  - Arrange for interpretive services to assist at discharge as needed  - Refer to Case Management Department for coordinating discharge planning if the patient needs post-hospital services based on physician/advanced practitioner order or complex needs related to functional status, cognitive ability, or social support system  Outcome: Adequate for Discharge     Problem: Knowledge Deficit  Goal: Patient/family/caregiver demonstrates understanding of disease process, treatment plan, medications, and discharge instructions  Description: Complete learning assessment and assess knowledge base.  Interventions:  - Provide teaching at level of understanding  - Provide teaching via preferred learning methods  Outcome: Adequate for Discharge

## 2024-05-01 NOTE — PROGRESS NOTES
Orthopedics   Anisa Rowe 73 y.o. female MRN: [id number]  Unit/Bed#: WE 2 N -01      Subjective:  73 y.o.female post operative day 2 left total hip arthroplasty with Dr Ray on 4/29/24. Pt seen and examined at bedside, doing well. Has expected post op pain which is improved from yesterday and controlled with Tylenol. Eager to participate in PT and hoping to be d/c today. Denies numbness and tingling in operative extremity.     Labs:  0   Lab Value Date/Time    HCT 35.6 04/30/2024 0552    HCT 42.3 04/10/2024 1139    HCT 42.3 12/29/2021 2359    HGB 11.8 04/30/2024 0552    HGB 14.5 04/10/2024 1139    HGB 14.2 12/29/2021 2359    INR 0.99 04/10/2024 1139    WBC 5.23 04/30/2024 0552    WBC 5.75 04/10/2024 1139    WBC 2.96 (L) 12/29/2021 2359       Meds:    Current Facility-Administered Medications:     acetaminophen (TYLENOL) tablet 650 mg, 650 mg, Oral, Q6H PRN, Piedad Mena PA-C, 650 mg at 05/01/24 0309    calcium carbonate (TUMS) chewable tablet 1,000 mg, 1,000 mg, Oral, Daily PRN, Piedad Mena PA-C    docusate sodium (COLACE) capsule 100 mg, 100 mg, Oral, BID, HUE Mak-C, 100 mg at 04/30/24 1743    enoxaparin (LOVENOX) subcutaneous injection 40 mg, 40 mg, Subcutaneous, Daily, HUE Mak-DAQUAN, 40 mg at 04/30/24 2222    gabapentin (NEURONTIN) capsule 100 mg, 100 mg, Oral, Q8H, HUE Mak-DAQUAN, 100 mg at 05/01/24 0607    HYDROmorphone (DILAUDID) injection 0.5 mg, 0.5 mg, Intravenous, Q2H PRN, Piedad Mena PA-C    ketorolac (TORADOL) injection 15 mg, 15 mg, Intravenous, Q6H PRN, Randee Stacy PA-C, 15 mg at 05/01/24 0607    lactated ringers infusion, 125 mL/hr, Intravenous, Continuous, Piedad Mena PA-C, Stopped at 04/30/24 0632    lactated ringers infusion, 125 mL/hr, Intravenous, Continuous, Piedad Mena PA-C, Stopped at 04/30/24 0632    melatonin tablet 3 mg, 3 mg, Oral, HS, Randee Stacy PA-C, 3 mg at 04/30/24 2223    methocarbamol (ROBAXIN) tablet 500 mg, 500 mg, Oral, Q6H [facility], Piedad Mena,  PA-DAQUAN, 500 mg at 05/01/24 0310    ondansetron (ZOFRAN) injection 4 mg, 4 mg, Intravenous, Q6H PRN, HUE Mak-C, 4 mg at 04/30/24 1917    oxyCODONE (ROXICODONE) IR tablet 2.5 mg, 2.5 mg, Oral, Q4H PRN, Piedad Mena, PA-C, 2.5 mg at 04/30/24 0740    oxyCODONE (ROXICODONE) IR tablet 5 mg, 5 mg, Oral, Q4H PRN, Piedad Mena, PA-C, 5 mg at 04/30/24 1156    promethazine (PHENERGAN) 25 mg/mL injection **ADS Override Pull**, , , ,     Ins and Outs:  I/O last 24 hours:  In: 600 [P.O.:600]  Out: -       Physical Exam:  Vitals:    05/01/24 0653   BP: 113/60   Pulse: 82   Resp: 17   Temp: 98.7 °F (37.1 °C)   SpO2: 96%     Gen: AAOx3; NAD  Skin: warm and dry  EENT: EOMI, hearing intact, trachea midline  left lower extremity:  Dressings C/D/I  Upper and lower compartments soft and compressible  Sensation intact L2-S1  Motor intact L2-S1  Palpable dorsalis pedis   Calf soft and nontender    _*_*_*_*_*_*_*_*_*_*_*_*_*_*_*_*_*_*_*_*_*_*_*_*_*_*_*_*_*_*_*_*_*_*_*_*_*_*_*_*_*    Assessment: 73 y.o.female post operative day 2 left total hip arthroplasty. Doing well    Plan:  Weight Bearing as tolerated - LLE  Up and out of bed with assist  Posterior total hip precautions  Abduction pillow while in bed  DVT prophylaxis - Lovenox and mechanical  Incentive spirometer  Analgesics  PT/OT  Stable to be discharged from orthopedic perspective once cleared by PT.  Patient noted to have acute blood loss anemia due to a drop in Hbg of > 2.0g from preop levels, will monitor vital signs and resuscitate with IV fluids as needed    Shola Naik PA-C

## 2024-05-01 NOTE — UTILIZATION REVIEW
NOTIFICATION OF INPATIENT ADMISSION      AUTHORIZATION REQUEST   SERVICING FACILITY:   [facility]  [address], [place]  [address]  Tax ID: [id number]  NPI: [id number]  Phone: [phone] ATTENDING PROVIDER:  Attending Name and NPI#: Jethro Ray Md [[id number]]  Address: [address], [place]  [address]  Phone: [phone]   ADMISSION INFORMATION:  Place of Service: Inpatient Acute [facility]  Place of Service Code: 21  Inpatient Admission Date/Time: 4/30/24  2:56 PM  Discharge Date/Time: No discharge date for patient encounter.  Admitting Diagnosis Code/Description:  Primary osteoarthritis of one hip, left [M16.12]     UTILIZATION REVIEW CONTACT:  Mabel Parker Utilization   Network Utilization Review Department  Phone: [phone]  Fax: [phone]  Email: [email or web address]  Contact for approvals/pending authorizations, clinical reviews, and discharge.     PHYSICIAN ADVISORY SERVICES:  Medical Necessity Denial & [place] Review  Phone: [phone]  Fax: [phone]  Email: [email or web address]     DISCHARGE SUPPORT TEAM:  For Patients Discharge Needs & Updates  Phone: [phone] opt. 2 Fax: [phone]  Email: [email or web address]

## 2024-05-01 NOTE — DISCHARGE SUMMARY
Discharge Summary - Orthopedics   Anisa Rowe 73 y.o. female MRN: [id number]  Unit/Bed#: WE 2 N -01    Attending Physician: Dr. Jethro Ray    Admitting diagnosis: Primary osteoarthritis of one hip, left [M16.12]    Discharge diagnosis: Primary osteoarthritis of one hip, left [M16.12]    Date of admission: 4/29/2024    Date of discharge: 05/01/24    Procedure: Left YONATAN     HPI: 73 y.o. female with a history of Primary osteoarthritis of one hip, left [M16.12] who has been seen by Dr. Jethro Ray in clinic.  Pt has failed previous non operative therapies and was scheduled for left YONATAN.  Prior to surgery the risks and benefits of surgery were explained and informed consent was obtained.    Hospital course: Pt was taken to the OR on 4/29/2024.  Surgery went without complications and pt was discharged to the PACU in a stable condition and was transferred to the floor on postop day 1, she was having severe pain and having difficulty with doing physical therapy.  However, the following day she felt much better in terms of pain control and able to bear weight more effectively.  On discharge date pt was cleared by PT and determined to be safe for discharge.  Daily discussion was had with the patient, nursing staff, orthopaedic team, and family members if present.  All questions were answered to the patients satisifaction.      0   Lab Value Date/Time    HGB 11.8 04/30/2024 0552    HGB 14.5 04/10/2024 1139    HGB 14.2 12/29/2021 2359        Discharge Instructions:   As per discharge instructions in epic  Keep dressings clean and dry at all times   Complete DVT prophylaxis as prescribed   Physical therapy  Follow-up as scheduled, otherwise call for appt.     Discharge Medications:  For the complete list of discharge medications, please refer to the patient's medication reconciliation.

## 2024-05-01 NOTE — PLAN OF CARE
Problem: OCCUPATIONAL THERAPY ADULT  Goal: Performs self-care activities at highest level of function for planned discharge setting.  See evaluation for individualized goals.  Description: Treatment Interventions: ADL retraining, Endurance training, Functional transfer training, Patient/family training, Equipment evaluation/education, Compensatory technique education, Continued evaluation, Energy conservation, Activityengagement          See flowsheet documentation for full assessment, interventions and recommendations.   2024 1632 by Hamida Myrick OT  Outcome: Progressing  Note: Limitation: Decreased ADL status, Decreased endurance, Decreased self-care trans, Decreased high-level ADLs  Prognosis: Good  Assessment: Pt seen for OT treatment session focusing on ADLs/IADLs, functional mobility, functional standing tolerance, functional transfers, patient education, continued evaluation. Pt greeted in bedside chair at start of session. Pt alert and cooperative throughout session. Pt with good sitting balance and fair- dynamic standing balance. Pt tolerated treatment well. Pt completed don of bra/shirt with supervision seated in chair. Educated pt on available LH AE through verbal instructions and demonstration with pt reporting understanding of education. Pt completed LB dressing with use of LH AE seated in chair. Pt don of pants with use of reacher. BP standin/71. Pt completed functional mobility from chair to hallway functional household distances with use of RW. Pt completed stand to sit into chair with supervision and use of armrests and RW. Pt educated on car transfers, LB dressing, and LB bathing for independence at home. Pt reports 2/10 pain and no dizziness. Pt's vitals include: stable.     Pt ended session seated in bedside chair. Call bell and phone within reach. All needs met and pt reports no further questions at this time. Continue to recommend no post acute rehabilitation needs when medically  cleared. OT will continue to follow pt on caseload.     Rehab Resource Intensity Level, OT: No post-acute rehabilitation needs       5/1/2024 1632 by Hamida Myrick, OT  Reactivated

## 2024-05-02 ENCOUNTER — TELEPHONE (OUTPATIENT)
Dept: OBGYN CLINIC | Facility: HOSPITAL | Age: 74
End: 2024-05-02

## 2024-05-02 ENCOUNTER — TELEPHONE (OUTPATIENT)
Age: 74
End: 2024-05-02

## 2024-05-02 NOTE — TELEPHONE ENCOUNTER
Caller: Maria Teresa, [facility]      Doctor: Flor     Reason for call: Patient is s/p left YONATAN 4/29/24. She admitted the patient to Home Health today. She states that patient is taking Tylenol, Methocarbamol and Lovenox. The patient has not taken any supplements since before the surgery. The patient did not  the Oxycodone from the pharmacy.   Maria Teresa did inform the patient to use ice for 20-30 mins every hour and to elevate the leg and did advise patient of the signs of infection/DVT to look out for.   Maria Teresa is asking for confirmation that the dressing should remain until the patient's PO appt. Please advise     Call back#: [phone]

## 2024-05-02 NOTE — TELEPHONE ENCOUNTER
Spoke to Maria Teresa. All questions answered.   Made her aware I attempted patient, no answer. VM Left. NN team will try again tomorrow.

## 2024-05-03 ENCOUNTER — TELEPHONE (OUTPATIENT)
Age: 74
End: 2024-05-03

## 2024-05-03 ENCOUNTER — APPOINTMENT (OUTPATIENT)
Dept: RADIOLOGY | Facility: MEDICAL CENTER | Age: 74
End: 2024-05-03
Payer: COMMERCIAL

## 2024-05-03 ENCOUNTER — OFFICE VISIT (OUTPATIENT)
Dept: OBGYN CLINIC | Facility: MEDICAL CENTER | Age: 74
End: 2024-05-03

## 2024-05-03 VITALS
BODY MASS INDEX: 25.01 KG/M2 | HEART RATE: 74 BPM | HEIGHT: 68 IN | WEIGHT: 165 LBS | DIASTOLIC BLOOD PRESSURE: 75 MMHG | SYSTOLIC BLOOD PRESSURE: 125 MMHG

## 2024-05-03 DIAGNOSIS — Z98.890 STATUS POST SURGERY: Primary | ICD-10-CM

## 2024-05-03 DIAGNOSIS — Z98.890 STATUS POST SURGERY: ICD-10-CM

## 2024-05-03 PROCEDURE — 99024 POSTOP FOLLOW-UP VISIT: CPT

## 2024-05-03 PROCEDURE — 73502 X-RAY EXAM HIP UNI 2-3 VIEWS: CPT

## 2024-05-03 NOTE — PROGRESS NOTES
Assessment:  1. S/P total hip arthroplasty - Left  XR hip/pelv 2-3 vws left if performed          Plan:  4 days s/p left YONATAN, 4/29/2024  Patient had fall onto knees 5/2/2024 with increase of left hip pain  Radiograph displays a well positioned prosthesis with no fractures.    Continue home physical therapy  Continue Lovenox daily  Follow up with already scheduled 1 week follow up.    To do next visit:  No follow-ups on file.    The above stated was discussed in layman's terms and the patient expressed understanding.  All questions were answered to the patient's satisfaction.         Subjective:   Anisa oRwe is a 73 y.o. female who presents 4 days s/p left YONATAN, 4/29/2024.  She did fall onto her knees last night, 5/2/2024.  Today she complains of generalized left hip pain.  She does use walker for ambulation.  She does use Tylenol, Advil and methocarbamol for pain.  She does participate in home physical therapy. He denies fever, chills or shortness of breath.        Review of systems negative unless otherwise specified in HPI    Past Medical History:   Diagnosis Date    Arthritis     stiff knees    Exercises daily     Female bladder prolapse     VEC today 11/16/2020     pelvic repair 3/2020    Hyperlipidemia     borderline    Hypoglycemia     Lyme disease     3 years ago    Migraine     not recent    Osteoporosis     borderline    Urinary incontinence     at times    Urinary urgency     Uterine prolapse     Wears dentures     bridge lower    Wears glasses        Past Surgical History:   Procedure Laterality Date    GA ARTHRP ACETBLR/PROX FEM PROSTC AGRFT/ALGRFT Left 4/29/2024    Procedure: ARTHROPLASTY HIP TOTAL;  Surgeon: Jethro Ray MD;  Location:  MAIN OR;  Service: Orthopedics    GA CMBND ANTERPOST COLPORRAPHY W/CYSTO N/A 11/16/2020    Procedure: ANT COLPORRHAPHY;  Surgeon: Jerrell Zhu MD;  [facility];  Service: UroGynecology           GA COLPOPEXY VAGINAL EXTRAPERITONEAL APPROACH N/A  "3/9/2020    Procedure: V.E.C.(ENPLACE);  Surgeon: Jerrell Zhu MD;  [facility];  Service: UroGynecology           TN COLPOPEXY VAGINAL EXTRAPERITONEAL APPROACH N/A 11/16/2020    Procedure: V.E.C. w/ enplace;  Surgeon: Jerrell Zhu MD;  [facility];  Service: UroGynecology           TN CYSTOURETHROSCOPY N/A 3/9/2020    Procedure: CYSTOSCOPY;  Surgeon: Jerrell Zhu MD;  [facility];  Service: UroGynecology           TN CYSTOURETHROSCOPY N/A 11/16/2020    Procedure: CYSTOSCOPY;  Surgeon: Jerrell Zhu MD;  [facility];  Service: UroGynecology           TN POST COLPORRHAPHY RECTOCELE W/WO PERINEORRHAPHY N/A 3/9/2020    Procedure: ANTERIOR AND POST COLPORRHAPHY;  Surgeon: Jerrell Zhu MD;  [facility];  Service: UroGynecology           TN SLING OPERATION STRESS INCONTINENCE N/A 3/9/2020    Procedure: SINGLE INCISION SLING;  Surgeon: Jerrell Zhu MD;  [facility];  Service: UroGynecology           VAGINAL DELIVERY      \"47 yrs ago did have sedation\"    WISDOM TOOTH EXTRACTION         Family History   Problem Relation Age of Onset    Osteoporosis Mother     Stroke Father     Diabetes Father     Diabetes Sister        Social History     Occupational History    Occupation:      Comment: retired   Tobacco Use    Smoking status: Never    Smokeless tobacco: Never   Vaping Use    Vaping status: Never Used   Substance and Sexual Activity    Alcohol use: Never    Drug use: No    Sexual activity: Not Currently         Current Outpatient Medications:     acetaminophen (TYLENOL) 500 mg tablet, Take 1 tablet (500 mg total) by mouth every 6 (six) hours as needed for mild pain, Disp: 30 tablet, Rfl: 0    ALPHA LIPOIC ACID PO, Take by mouth, Disp: , Rfl:     Bromelains (BROMELAIN PO), Take by mouth, Disp: , Rfl:     cholecalciferol (VITAMIN D3) 1,000 units tablet, Take 1,000 Units by mouth daily, Disp: , Rfl:     Coenzyme Q10 (COQ10 PO), Take by mouth, " "Disp: , Rfl:     methocarbamol (ROBAXIN) 500 mg tablet, Take 1 tablet (500 mg total) by mouth 3 (three) times a day as needed for muscle spasms, Disp: 60 tablet, Rfl: 0    Multiple Vitamins-Minerals (multivitamin with minerals) tablet, Take 1 tablet by mouth daily, Disp: 30 tablet, Rfl: 2    oxyCODONE (Roxicodone) 5 immediate release tablet, Take 1 tablet (5 mg total) by mouth every 6 (six) hours as needed for moderate pain for up to 10 days Max Daily Amount: 20 mg, Disp: 20 tablet, Rfl: 0    QUERCETIN PO, Take by mouth, Disp: , Rfl:     b complex vitamins tablet, Take 1 tablet by mouth daily (Patient not taking: Reported on 3/15/2024), Disp: , Rfl:     dicyclomine (BENTYL) 20 mg tablet, Take 1 tablet (20 mg total) by mouth 2 (two) times a day as needed (Abdominal pain) (Patient not taking: Reported on 4/8/2024), Disp: 20 tablet, Rfl: 0    docusate sodium (COLACE) 100 mg capsule, Take 1 capsule (100 mg total) by mouth 2 (two) times a day (Patient not taking: Reported on 4/8/2024), Disp: 10 capsule, Rfl: 0    enoxaparin (LOVENOX) 40 mg/0.4 mL, Inject 0.4 mL (40 mg total) under the skin daily for 28 days To start Post-Op, Disp: 11.2 mL, Rfl: 0    ibuprofen (MOTRIN) 600 mg tablet, Take 1 tablet (600 mg total) by mouth every 6 (six) hours as needed for mild pain, Disp: 30 tablet, Rfl: 0    Omega-3 Fatty Acids (OMEGA-3 FISH OIL PO), Take by mouth (Patient not taking: Reported on 3/15/2024), Disp: , Rfl:     ondansetron (ZOFRAN-ODT) 4 mg disintegrating tablet, Take 1 tablet (4 mg total) by mouth every 8 (eight) hours as needed for nausea or vomiting (Patient not taking: Reported on 4/8/2024), Disp: 10 tablet, Rfl: 0    Allergies   Allergen Reactions    Other Nausea Only and Headache     Chemicals and perfumes    Wheat Bran - Food Allergy Diarrhea     \"just wheat\"            Vitals:    05/03/24 1247   BP: 125/75   Pulse: 74       Objective:  Physical exam  General: Awake, Alert, Oriented  Eyes: Pupils equal, round and " "reactive to light  Heart: regular rate and rhythm  Lungs: No audible wheezing  Abdomen: soft                    Ortho Exam  Left hip:  Posterior incision clean dry and intact  Staples well approximated   Appropriate warmth and swelling  Good arc of motion with no pain  Patient sits comfortably in chair with hip flexed at 90 degrees  Patient stands from seated position without assistance  Calf compartments soft and supple  Sensation intact  Toes are warm sensate and mobile      Diagnostics, reviewed and taken today if performed as documented:    The attending physician has personally reviewed the pertinent films in PACS and interpretation is as follows:  Left hip x-ray:  Well aligned prosthesis with no acute changes.  No fractures.      Procedures, if performed today:    None performed      Portions of the record may have been created with voice recognition software.  Occasional wrong word or \"sound a like\" substitutions may have occurred due to the inherent limitations of voice recognition software.  Read the chart carefully and recognize, using context, where substitutions have occurred.    "

## 2024-05-03 NOTE — TELEPHONE ENCOUNTER
Caller: Patient     Doctor: debbi    Reason for call: Patient fell last night and would like to speak with clinical team     Call back#: [phone]

## 2024-05-06 ENCOUNTER — TELEPHONE (OUTPATIENT)
Age: 74
End: 2024-05-06

## 2024-05-06 DIAGNOSIS — Z96.642 AFTERCARE FOLLOWING LEFT HIP JOINT REPLACEMENT SURGERY: Primary | ICD-10-CM

## 2024-05-06 DIAGNOSIS — Z47.1 AFTERCARE FOLLOWING LEFT HIP JOINT REPLACEMENT SURGERY: Primary | ICD-10-CM

## 2024-05-06 RX ORDER — TRAMADOL HYDROCHLORIDE 50 MG/1
50 TABLET ORAL EVERY 6 HOURS PRN
Qty: 20 TABLET | Refills: 0 | Status: SHIPPED | OUTPATIENT
Start: 2024-05-06

## 2024-05-06 NOTE — TELEPHONE ENCOUNTER
"Spoke to patient, for a postoperative check in. She had a fall and was in office with surgeon on Friday, 5/3.  S reports doing good over the weekend.    She denies any complaints, states \"I seem to be doing good.\" She is ambulating with the RW. She has OP PT today.       We discussed postoperative medications. She is just using the OTC Tylenol and Lovenox daily at this time. She is NOT using the Robaxin, Oxycodone or a stool softener (having regular BMs).    We discussed tylenol dosage, she states she is using 300mg every 4-6 hours. We discussed 3000mg/24 hour maximum.       She denies chest pain, SOB, dizziness, fever, calf pain or any addl symptoms. She denies questions or concerns.     pt encouraged to call me with questions, concerns or issues.      "

## 2024-05-06 NOTE — TELEPHONE ENCOUNTER
Caller: Maria Teresa from [facility]     Doctor: Yoan Mena    Reason for call: patient asking for a script for Tramadol  Preferred pharmacy is [facility] in [place]   Has not been taking the Oxycodone.      Electronically faxed 5/3 OVN to fax # [phone]     Call back#: Maria Teresa [phone]  Patient  [phone]

## 2024-05-07 NOTE — UTILIZATION REVIEW
NOTIFICATION OF ADMISSION DISCHARGE   This is a Notification of Discharge from [facility]. Please be advised that this patient has been discharge from our facility. Below you will find the admission and discharge date and time including the patient’s disposition.   UTILIZATION REVIEW CONTACT:  Mabel Parker  Utilization   Network Utilization Review Department  Phone: [phone] x carefully listen to the prompts. All voicemails are confidential.  Email: [email or web address]     ADMISSION INFORMATION  PRESENTATION DATE: 4/29/2024  8:23 AM  OBERVATION ADMISSION DATE:   INPATIENT ADMISSION DATE: 4/30/24  2:56 PM   DISCHARGE DATE: 5/1/2024 12:43 PM   DISPOSITION:Home with Home Health Care    Network Utilization Review Department  ATTENTION: Please call with any questions or concerns to [phone] and carefully listen to the prompts so that you are directed to the right person. All voicemails are confidential.   For Discharge needs, contact Care Management DC Support Team at [phone] opt. 2  Send all requests for admission clinical reviews, approved or denied determinations and any other requests to dedicated fax number below belonging to the campus where the patient is receiving treatment. List of dedicated fax numbers for the Facilities:  FACILITY NAME UR FAX NUMBER   ADMISSION DENIALS (Administrative/Medical Necessity) [phone]   DISCHARGE SUPPORT TEAM ([facility]) [phone]   PARENT CHILD HEALTH (Maternity/NICU/Pediatrics) [phone]   [facility] [phone]   [facility] [phone]   [facility] [phone]   [facility] [phone]   [facility] [phone]   [facility] [phone]   [facility] [phone]   [facility]  [phone]   [facility] [phone]   [facility] [phone]   [facility] [phone]   [facility] [phone]

## 2024-05-07 NOTE — TELEPHONE ENCOUNTER
Caller: Maria Teresa from [facility]      Doctor: Flor     Reason for call: Patient is s/p left YONATAN, sx 4/29/24. She is asking if the patient will be having the sutures removed at next PO appt on 5/10 and if there is any instructions or information that she should have from the doctor about the incision. She states it looks good, no redness or discharge. She said the patient was told to leave it open to air and she wants to confirm that as well. She will be seeing the patient again tomorrow. Please advise     Call back#: [phone]-Maria Teresa

## 2024-05-10 ENCOUNTER — OFFICE VISIT (OUTPATIENT)
Dept: OBGYN CLINIC | Facility: MEDICAL CENTER | Age: 74
End: 2024-05-10

## 2024-05-10 VITALS
SYSTOLIC BLOOD PRESSURE: 113 MMHG | DIASTOLIC BLOOD PRESSURE: 64 MMHG | WEIGHT: 165 LBS | BODY MASS INDEX: 25.01 KG/M2 | HEART RATE: 76 BPM | HEIGHT: 68 IN

## 2024-05-10 DIAGNOSIS — Z96.642 AFTERCARE FOLLOWING LEFT HIP JOINT REPLACEMENT SURGERY: Primary | ICD-10-CM

## 2024-05-10 DIAGNOSIS — Z47.1 AFTERCARE FOLLOWING LEFT HIP JOINT REPLACEMENT SURGERY: Primary | ICD-10-CM

## 2024-05-10 PROCEDURE — 99024 POSTOP FOLLOW-UP VISIT: CPT | Performed by: ORTHOPAEDIC SURGERY

## 2024-05-10 RX ORDER — NABUMETONE 500 MG/1
500 TABLET, FILM COATED ORAL 2 TIMES DAILY
Qty: 60 TABLET | Refills: 2 | Status: SHIPPED | OUTPATIENT
Start: 2024-05-10

## 2024-05-10 NOTE — PROGRESS NOTES
Assessment:   Diagnosis ICD-10-CM Associated Orders   1. Aftercare following left hip joint replacement surgery  Z47.1 Ambulatory Referral to Home Health    Z96.642 nabumetone (RELAFEN) 500 mg tablet          Plan:  73 y.o. female 11 days s/p left YONATAN   Continue home physical therapy with progression to outpatient therapy when able to drive   Continue weight bearing activities as tolerated   Continue pain medications as needed  Anti-inflammatory sent to pharmacy today    Staples removed in office today, incision cleaned, steri-strips applied   Follow up in 4 weeks for 6 week post-op appointment      Diagnostics reviewed and physical exam performed.  Diagnosis, treatment options and associated risks were discussed with the patient including no treatment, nonsurgical treatment and potential for surgical intervention.  The patient was given the opportunity to ask questions regarding each.     The above stated was discussed in layman's terms and the patient expressed understanding.  All questions were answered to the patient's satisfaction.     To do next visit:  Return in about 4 weeks (around 6/7/2024) for 6 week post-op appointment and visco injections to bilateral knees .      Subjective:   Anisa Rowe is a 73 y.o. female who presents 11 days s/p left total hip arthroplasty.  She is doing well.  She admits to some pain and soreness which she has been able to manage well with tramadol, tylenol, and a muscle relaxer.  She admits to receiving home physical therapy and has been making great progress.  Patient currently ambulating well with a walker.  She continues to take lovenox daily.        Review of systems negative unless otherwise specified in HPI    Past Medical History:   Diagnosis Date    Arthritis     stiff knees    Exercises daily     Female bladder prolapse     VEC today 11/16/2020     pelvic repair 3/2020    Hyperlipidemia     borderline    Hypoglycemia     Lyme disease     3 years ago    Migraine     not  "recent    Osteoporosis     borderline    Urinary incontinence     at times    Urinary urgency     Uterine prolapse     Wears dentures     bridge lower    Wears glasses        Past Surgical History:   Procedure Laterality Date    [place] ARTHRP ACETBLR/PROX FEM PROSTC AGRFT/ALGRFT Left 4/29/2024    Procedure: ARTHROPLASTY HIP TOTAL;  Surgeon: Jethro Ray MD;  [facility];  Service: Orthopedics    OR CMBND ANTERPOST COLPORRAPHY W/CYSTO N/A 11/16/2020    Procedure: ANT COLPORRHAPHY;  Surgeon: Jerrell Zhu MD;  [facility];  Service: UroGynecology           OR COLPOPEXY VAGINAL EXTRAPERITONEAL APPROACH N/A 3/9/2020    Procedure: V.E.C.(ENPLACE);  Surgeon: Jerrell Zhu MD;  [facility];  Service: UroGynecology           OR COLPOPEXY VAGINAL EXTRAPERITONEAL APPROACH N/A 11/16/2020    Procedure: V.E.C. w/ enplace;  Surgeon: Jerrell Zhu MD;  [facility];  Service: UroGynecology           OR CYSTOURETHROSCOPY N/A 3/9/2020    Procedure: CYSTOSCOPY;  Surgeon: Jerrell Zhu MD;  [facility];  Service: UroGynecology           OR CYSTOURETHROSCOPY N/A 11/16/2020    Procedure: CYSTOSCOPY;  Surgeon: Jerrell Zhu MD;  [facility];  Service: UroGynecology           OR POST COLPORRHAPHY RECTOCELE W/WO PERINEORRHAPHY N/A 3/9/2020    Procedure: ANTERIOR AND POST COLPORRHAPHY;  Surgeon: Jerrell Zhu MD;  [facility];  Service: UroGynecology           OR SLING OPERATION STRESS INCONTINENCE N/A 3/9/2020    Procedure: SINGLE INCISION SLING;  Surgeon: Jerrell Zhu MD;  [facility];  Service: UroGynecology           VAGINAL DELIVERY      \"47 yrs ago did have sedation\"    WISDOM TOOTH EXTRACTION         Family History   Problem Relation Age of Onset    Osteoporosis Mother     Stroke Father     Diabetes Father     Diabetes Sister        Social History     Occupational History    Occupation: [employer]     Comment: retired   Tobacco Use    " Smoking status: Never    Smokeless tobacco: Never   Vaping Use    Vaping status: Never Used   Substance and Sexual Activity    Alcohol use: Never    Drug use: No    Sexual activity: Not Currently         Current Outpatient Medications:     acetaminophen (TYLENOL) 500 mg tablet, Take 1 tablet (500 mg total) by mouth every 6 (six) hours as needed for mild pain, Disp: 30 tablet, Rfl: 0    ALPHA LIPOIC ACID PO, Take by mouth, Disp: , Rfl:     Bromelains (BROMELAIN PO), Take by mouth, Disp: , Rfl:     cholecalciferol (VITAMIN D3) 1,000 units tablet, Take 1,000 Units by mouth daily, Disp: , Rfl:     Coenzyme Q10 (COQ10 PO), Take by mouth, Disp: , Rfl:     methocarbamol (ROBAXIN) 500 mg tablet, Take 1 tablet (500 mg total) by mouth 3 (three) times a day as needed for muscle spasms, Disp: 60 tablet, Rfl: 0    Multiple Vitamins-Minerals (multivitamin with minerals) tablet, Take 1 tablet by mouth daily, Disp: 30 tablet, Rfl: 2    nabumetone (RELAFEN) 500 mg tablet, Take 1 tablet (500 mg total) by mouth 2 (two) times a day, Disp: 60 tablet, Rfl: 2    QUERCETIN PO, Take by mouth, Disp: , Rfl:     traMADol (Ultram) 50 mg tablet, Take 1 tablet (50 mg total) by mouth every 6 (six) hours as needed for moderate pain, Disp: 20 tablet, Rfl: 0    b complex vitamins tablet, Take 1 tablet by mouth daily (Patient not taking: Reported on 3/15/2024), Disp: , Rfl:     dicyclomine (BENTYL) 20 mg tablet, Take 1 tablet (20 mg total) by mouth 2 (two) times a day as needed (Abdominal pain) (Patient not taking: Reported on 4/8/2024), Disp: 20 tablet, Rfl: 0    docusate sodium (COLACE) 100 mg capsule, Take 1 capsule (100 mg total) by mouth 2 (two) times a day (Patient not taking: Reported on 4/8/2024), Disp: 10 capsule, Rfl: 0    enoxaparin (LOVENOX) 40 mg/0.4 mL, Inject 0.4 mL (40 mg total) under the skin daily for 28 days To start Post-Op, Disp: 11.2 mL, Rfl: 0    ibuprofen (MOTRIN) 600 mg tablet, Take 1 tablet (600 mg total) by mouth every 6  "(six) hours as needed for mild pain, Disp: 30 tablet, Rfl: 0    Omega-3 Fatty Acids (OMEGA-3 FISH OIL PO), Take by mouth (Patient not taking: Reported on 3/15/2024), Disp: , Rfl:     ondansetron (ZOFRAN-ODT) 4 mg disintegrating tablet, Take 1 tablet (4 mg total) by mouth every 8 (eight) hours as needed for nausea or vomiting (Patient not taking: Reported on 4/8/2024), Disp: 10 tablet, Rfl: 0    Allergies   Allergen Reactions    Other Nausea Only and Headache     Chemicals and perfumes    Wheat Bran - Food Allergy Diarrhea     \"just wheat\"            Vitals:    05/10/24 1052   BP: 113/64   Pulse: 76       Objective:  Physical exam  General: Awake, Alert, Oriented  Eyes: Pupils equal, round and reactive to light  Heart: regular rate and rhythm  Lungs: No audible wheezing  Abdomen: soft                    Ortho Exam  Left hip:   Posterior incision clean dry and intact  Staples well approximated; removed today  Incision cleaned, steri-strips applied   Appropriate warmth and swelling  Good arc of motion with no pain  Patient sits comfortably in chair with hip flexed at 90 degrees  Patient stands from seated position without assistance  Calf compartments soft and supple  Sensation intact  Toes are warm sensate and mobile     Diagnostics, reviewed and taken today if performed as documented:    None performed        Procedures, if performed today:    None performed      Portions of the record may have been created with voice recognition software.  Occasional wrong word or \"sound a like\" substitutions may have occurred due to the inherent limitations of voice recognition software.  Read the chart carefully and recognize, using context, where substitutions have occurred.      "

## 2024-05-14 ENCOUNTER — TELEPHONE (OUTPATIENT)
Age: 74
End: 2024-05-14

## 2024-05-14 DIAGNOSIS — Z96.642 AFTERCARE FOLLOWING LEFT HIP JOINT REPLACEMENT SURGERY: Primary | ICD-10-CM

## 2024-05-14 DIAGNOSIS — Z47.1 AFTERCARE FOLLOWING LEFT HIP JOINT REPLACEMENT SURGERY: Primary | ICD-10-CM

## 2024-05-14 NOTE — TELEPHONE ENCOUNTER
Caller: [facility] - Resident    Doctor/Office: Flor Dickens    CB#: [phone]    What needs to be faxed: Any new/updated PT orders for patient after last office visit on 5/10/2024    ATTN to: [facility]    Fax#: [phone]

## 2024-05-16 ENCOUNTER — TELEPHONE (OUTPATIENT)
Age: 74
End: 2024-05-16

## 2024-05-16 NOTE — TELEPHONE ENCOUNTER
Caller: Anisa    Doctor: Flor    Reason for call: Has been taking the enoxaparin 40 mg for about 17 days, and now she is noticing bleeding and bruising at the injection site, and is wondering if she should continue with the medication?    Call back#: [phone]

## 2024-06-07 ENCOUNTER — PROCEDURE VISIT (OUTPATIENT)
Dept: OBGYN CLINIC | Facility: MEDICAL CENTER | Age: 74
End: 2024-06-07
Payer: COMMERCIAL

## 2024-06-07 VITALS
HEART RATE: 78 BPM | WEIGHT: 168.9 LBS | BODY MASS INDEX: 25.68 KG/M2 | DIASTOLIC BLOOD PRESSURE: 82 MMHG | SYSTOLIC BLOOD PRESSURE: 126 MMHG

## 2024-06-07 DIAGNOSIS — Z96.642 AFTERCARE FOLLOWING LEFT HIP JOINT REPLACEMENT SURGERY: Primary | ICD-10-CM

## 2024-06-07 DIAGNOSIS — M17.0 PRIMARY OSTEOARTHRITIS OF BOTH KNEES: ICD-10-CM

## 2024-06-07 DIAGNOSIS — Z47.1 AFTERCARE FOLLOWING LEFT HIP JOINT REPLACEMENT SURGERY: Primary | ICD-10-CM

## 2024-06-07 DIAGNOSIS — M25.561 CHRONIC PAIN OF BOTH KNEES: ICD-10-CM

## 2024-06-07 DIAGNOSIS — G89.29 CHRONIC PAIN OF BOTH KNEES: ICD-10-CM

## 2024-06-07 DIAGNOSIS — M62.81 MUSCLE WEAKNESS OF LOWER EXTREMITY: ICD-10-CM

## 2024-06-07 DIAGNOSIS — R26.2 AMBULATORY DYSFUNCTION: ICD-10-CM

## 2024-06-07 DIAGNOSIS — M25.562 CHRONIC PAIN OF BOTH KNEES: ICD-10-CM

## 2024-06-07 PROCEDURE — 20610 DRAIN/INJ JOINT/BURSA W/O US: CPT | Performed by: ORTHOPAEDIC SURGERY

## 2024-06-07 PROCEDURE — 99213 OFFICE O/P EST LOW 20 MIN: CPT | Performed by: ORTHOPAEDIC SURGERY

## 2024-06-07 RX ORDER — LIDOCAINE HYDROCHLORIDE 10 MG/ML
4 INJECTION, SOLUTION INFILTRATION; PERINEURAL
Status: COMPLETED | OUTPATIENT
Start: 2024-06-07 | End: 2024-06-07

## 2024-06-07 RX ADMIN — LIDOCAINE HYDROCHLORIDE 4 ML: 10 INJECTION, SOLUTION INFILTRATION; PERINEURAL at 11:00

## 2024-06-07 NOTE — PROGRESS NOTES
Assessment:   Diagnosis ICD-10-CM Associated Orders   1. Aftercare following left hip joint replacement surgery  Z47.1 Ambulatory Referral to Physical Therapy    Z96.642       2. Primary osteoarthritis of both knees  M17.0 Large joint arthrocentesis: bilateral knee      3. Chronic pain of both knees  M25.561 Large joint arthrocentesis: bilateral knee    M25.562     G89.29       4. Ambulatory dysfunction  R26.2 Ambulatory Referral to Physical Therapy      5. Muscle weakness of lower extremity  M62.81 Ambulatory Referral to Physical Therapy          Plan:  5 weeks status post left total hip arthroplasty and overall doing well.  Continue with total hip cautions as discussed.   Continue physical therapy for ongoing improvement and rehabilitation, initiate outpatient PT, Rx provided today.   Both her knees, known advanced osteoarthritis were injected with the Durolane Visco product today.  She tolerated both injections well.  Ice and postinjection protocol advised.  Weightbearing activity as tolerated.    To do next visit:  Return in about 7 weeks (around 7/26/2024) for re-check with x-rays upon arrival left hip and pelvis.    The above stated was discussed in layman's terms and the patient expressed understanding.  All questions were answered to the patient's satisfaction.       Scribe Attestation      I,:  Rick Soria am acting as a scribe while in the presence of the attending physician.:       I,:  Jethro Ray MD personally performed the services described in this documentation    as scribed in my presence.:               Subjective:   Anisa Rowe is a 73 y.o. female who presents today for repeat evaluation of her bilateral knees, known osteoarthritis.  She is also 5 weeks status post left total hip arthroplasty and overall doing well.  She presents using a walking stick for ambulatory assistance.  She is receiving home physical therapy and progressing well. She is interested in outpatient PT.   In regards  to her knees, known advanced osteoarthritis.  She presents today for the administration of the Durolane Visco product of both knees.  She has increased knee pain with weightbearing activities.  She is stiffness getting up with her prolonged sedentary positions.  Pain scale at the level of her knees, 9/10 or greater at times.  She finds that her knee symptoms do limit her day-to-day activities and impedes on her quality of life.  She will be interested in scheduling elective total knee arthroplasty at some point once she recovers well enough from her left total hip arthroplasty.  She notes swelling at her left lower extremity but denies any calf or thigh pain.  Denies any fevers or chills.    She describes her left hip symptoms as achy.     She admits to mowing and gardening in a limited capacity.     Review of systems negative unless otherwise specified in HPI  Review of Systems    Past Medical History:   Diagnosis Date    Arthritis     stiff knees    Exercises daily     Female bladder prolapse     VEC today 11/16/2020     pelvic repair 3/2020    Hyperlipidemia     borderline    Hypoglycemia     Lyme disease     3 years ago    Migraine     not recent    Osteoporosis     borderline    Urinary incontinence     at times    Urinary urgency     Uterine prolapse     Wears dentures     bridge lower    Wears glasses        Past Surgical History:   Procedure Laterality Date    [place] ARTHRP ACETBLR/PROX FEM PROSTC AGRFT/ALGRFT Left 4/29/2024    Procedure: ARTHROPLASTY HIP TOTAL;  Surgeon: Jethro Ray MD;  Location:  [facility];  Service: Orthopedics    [place] CMBND ANTERPOST COLPORRAPHY W/CYSTO N/A 11/16/2020    Procedure: ANT COLPORRHAPHY;  Surgeon: Jerrell Zhu MD;  [facility];  Service: UroGynecology           [place] COLPOPEXY VAGINAL EXTRAPERITONEAL APPROACH N/A 3/9/2020    Procedure: V.E.C.(ENPLACE);  Surgeon: Jerrell Zhu MD;  [facility];  Service: UroGynecology           [place] COLPOPEXY VAGINAL  "EXTRAPERITONEAL APPROACH N/A 11/16/2020    Procedure: V.E.C. w/ enplace;  Surgeon: Jerrell Zhu MD;  [facility];  Service: UroGynecology           ME CYSTOURETHROSCOPY N/A 3/9/2020    Procedure: CYSTOSCOPY;  Surgeon: Jerrell Zhu MD;  [facility];  Service: UroGynecology           ME CYSTOURETHROSCOPY N/A 11/16/2020    Procedure: CYSTOSCOPY;  Surgeon: Jerrell Zhu MD;  [facility];  Service: UroGynecology           ME POST COLPORRHAPHY RECTOCELE W/WO PERINEORRHAPHY N/A 3/9/2020    Procedure: ANTERIOR AND POST COLPORRHAPHY;  Surgeon: Jerrell Zhu MD;  [facility];  Service: UroGynecology           ME SLING OPERATION STRESS INCONTINENCE N/A 3/9/2020    Procedure: SINGLE INCISION SLING;  Surgeon: Jerrell Zhu MD;  [facility];  Service: UroGynecology           VAGINAL DELIVERY      \"47 yrs ago did have sedation\"    WISDOM TOOTH EXTRACTION         Family History   Problem Relation Age of Onset    Osteoporosis Mother     Stroke Father     Diabetes Father     Diabetes Sister        Social History     Occupational History    Occupation:      Comment: retired   Tobacco Use    Smoking status: Never    Smokeless tobacco: Never   Vaping Use    Vaping status: Never Used   Substance and Sexual Activity    Alcohol use: Never    Drug use: No    Sexual activity: Not Currently         Current Outpatient Medications:     acetaminophen (TYLENOL) 500 mg tablet, Take 1 tablet (500 mg total) by mouth every 6 (six) hours as needed for mild pain, Disp: 30 tablet, Rfl: 0    ALPHA LIPOIC ACID PO, Take by mouth, Disp: , Rfl:     Bromelains (BROMELAIN PO), Take by mouth, Disp: , Rfl:     cholecalciferol (VITAMIN D3) 1,000 units tablet, Take 1,000 Units by mouth daily, Disp: , Rfl:     Coenzyme Q10 (COQ10 PO), Take by mouth, Disp: , Rfl:     methocarbamol (ROBAXIN) 500 mg tablet, Take 1 tablet (500 mg total) by mouth 3 (three) times a day as needed for muscle spasms, " "Disp: 60 tablet, Rfl: 0    Multiple Vitamins-Minerals (multivitamin with minerals) tablet, Take 1 tablet by mouth daily, Disp: 30 tablet, Rfl: 2    nabumetone (RELAFEN) 500 mg tablet, Take 1 tablet (500 mg total) by mouth 2 (two) times a day, Disp: 60 tablet, Rfl: 2    QUERCETIN PO, Take by mouth, Disp: , Rfl:     traMADol (Ultram) 50 mg tablet, Take 1 tablet (50 mg total) by mouth every 6 (six) hours as needed for moderate pain, Disp: 20 tablet, Rfl: 0    b complex vitamins tablet, Take 1 tablet by mouth daily (Patient not taking: Reported on 3/15/2024), Disp: , Rfl:     dicyclomine (BENTYL) 20 mg tablet, Take 1 tablet (20 mg total) by mouth 2 (two) times a day as needed (Abdominal pain) (Patient not taking: Reported on 4/8/2024), Disp: 20 tablet, Rfl: 0    docusate sodium (COLACE) 100 mg capsule, Take 1 capsule (100 mg total) by mouth 2 (two) times a day (Patient not taking: Reported on 4/8/2024), Disp: 10 capsule, Rfl: 0    enoxaparin (LOVENOX) 40 mg/0.4 mL, Inject 0.4 mL (40 mg total) under the skin daily for 28 days To start Post-Op, Disp: 11.2 mL, Rfl: 0    ibuprofen (MOTRIN) 600 mg tablet, Take 1 tablet (600 mg total) by mouth every 6 (six) hours as needed for mild pain, Disp: 30 tablet, Rfl: 0    Omega-3 Fatty Acids (OMEGA-3 FISH OIL PO), Take by mouth (Patient not taking: Reported on 3/15/2024), Disp: , Rfl:     ondansetron (ZOFRAN-ODT) 4 mg disintegrating tablet, Take 1 tablet (4 mg total) by mouth every 8 (eight) hours as needed for nausea or vomiting (Patient not taking: Reported on 4/8/2024), Disp: 10 tablet, Rfl: 0    Allergies   Allergen Reactions    Other Nausea Only and Headache     Chemicals and perfumes    Wheat Bran - Food Allergy Diarrhea     \"just wheat\"            Vitals:    06/07/24 1043   BP: 126/82   Pulse: 78       Body mass index is 25.68 kg/m².  Wt Readings from Last 3 Encounters:   06/07/24 76.6 kg (168 lb 14.4 oz)   05/10/24 74.8 kg (165 lb)   05/03/24 74.8 kg (165 lb) " "      Objective:                    Right Knee Exam     Muscle Strength   The patient has normal right knee strength.    Tenderness   The patient is experiencing tenderness in the medial joint line.    Range of Motion   Extension:  0   Flexion:  110 (With crepitation and stiffness of full flexion)     Other   Erythema: absent  Sensation: normal  Swelling: mild  Effusion: no effusion present      Left Knee Exam     Muscle Strength   The patient has normal left knee strength.    Tenderness   The patient is experiencing tenderness in the medial joint line.    Range of Motion   Extension:  0   Flexion:  110 (With crepitation and stiffness of full flexion)     Other   Erythema: absent  Sensation: normal  Swelling: mild  Effusion: no effusion present    Comments:    Both knees are in modest varus alignment  She has a firm bony prominence medially at her left knee      Left Hip Exam     Other   Erythema: absent  Sensation: normal    Comments:    Well-healed posterior lateral incision.  Minimal warmth.  Mild swelling.  No drainage.  No signs of infection.  Painless arc of gentle passive range of motion all planes.  Fairly decent strength however has quick fatigue.            Diagnostics, reviewed and taken today if performed as documented:    None performed        Procedures, if performed today:    Large joint arthrocentesis: bilateral knee  Universal Protocol:  Consent: Verbal consent obtained.  Risks and benefits: risks, benefits and alternatives were discussed  Consent given by: patient  Time out: Immediately prior to procedure a \"time out\" was called to verify the correct patient, procedure, equipment, support staff and site/side marked as required.  Timeout called at: 6/7/2024 11:29 AM.  Patient understanding: patient states understanding of the procedure being performed  Site marked: the operative site was marked  Patient identity confirmed: verbally with patient  Supporting Documentation  Indications: pain and " "diagnostic evaluation   Procedure Details  Location: knee - bilateral knee  Preparation: Patient was prepped and draped in the usual sterile fashion  Needle size: 22 G  Ultrasound guidance: no    Medications (Right): 4 mL lidocaine 1 %; 3 mL sodium hyaluronate 60 MG/3MLMedications (Left): 4 mL lidocaine 1 %; 3 mL sodium hyaluronate 60 MG/3ML   Patient tolerance: patient tolerated the procedure well with no immediate complications  Dressing:  Sterile dressing applied            Portions of the record may have been created with voice recognition software.  Occasional wrong word or \"sound a like\" substitutions may have occurred due to the inherent limitations of voice recognition software.  Read the chart carefully and recognize, using context, where substitutions have occurred.  "

## 2024-06-10 ENCOUNTER — TELEPHONE (OUTPATIENT)
Dept: OBGYN CLINIC | Facility: HOSPITAL | Age: 74
End: 2024-06-10

## 2024-06-10 NOTE — TELEPHONE ENCOUNTER
[facility]     Confirmed per note pt is to continue home PT and progress to outpatient PT.    Pt currently has 2 home PT visits left and will   6/24- home PT discharge date. They will assist Pt in scheduling outpatient.    Callback: [phone]

## 2024-06-10 NOTE — TELEPHONE ENCOUNTER
Spoke to [facility] home [facility]. Patient is going to out of network with PT, [facility] will confirm at next visit it is transitioned.     [facility] said she won't discharge her until OP PT appt is made.

## 2024-07-05 ENCOUNTER — EVALUATION (OUTPATIENT)
Dept: PHYSICAL THERAPY | Facility: CLINIC | Age: 74
End: 2024-07-05
Payer: COMMERCIAL

## 2024-07-05 DIAGNOSIS — Z96.642 AFTERCARE FOLLOWING LEFT HIP JOINT REPLACEMENT SURGERY: ICD-10-CM

## 2024-07-05 DIAGNOSIS — Z47.1 AFTERCARE FOLLOWING LEFT HIP JOINT REPLACEMENT SURGERY: ICD-10-CM

## 2024-07-05 DIAGNOSIS — M16.12 PRIMARY OSTEOARTHRITIS OF LEFT HIP: Primary | ICD-10-CM

## 2024-07-05 PROCEDURE — 97110 THERAPEUTIC EXERCISES: CPT | Performed by: PHYSICAL THERAPIST

## 2024-07-05 PROCEDURE — 97112 NEUROMUSCULAR REEDUCATION: CPT | Performed by: PHYSICAL THERAPIST

## 2024-07-05 PROCEDURE — 97161 PT EVAL LOW COMPLEX 20 MIN: CPT | Performed by: PHYSICAL THERAPIST

## 2024-07-05 NOTE — PROGRESS NOTES
PT Evaluation     Today's date: 2024  Patient name: Anisa Rowe  : 1950  MRN: [id number]  Referring provider: Jethro Ray,*  Dx:   Encounter Diagnosis     ICD-10-CM    1. Primary osteoarthritis of left hip  M16.12       2. Aftercare following left hip joint replacement surgery  Z47.1 Ambulatory Referral to Physical Therapy    Z96.642                      Assessment  Impairments: abnormal or restricted ROM, activity intolerance, impaired physical strength, lacks appropriate home exercise program and pain with function    Assessment details: Patient is a 73-year-old female status post left total hip arthroplasty with posterior precautions on 2024.  Patient presents with decreased functional mobility due to increased pain, decreased strength, decreased ROM, gait deviations requiring single-point cane Associated with aforementioned surgical procedure.  Patient will benefit from skilled physical therapy to address impairment and improve functional mobility.  PT needed to allow for return to maximal function and improve quality of life.   Understanding of Dx/Px/POC: good     Prognosis: good    Goals  STG within 4 weeks:   1. Patient to be independent in HEP.   2. Reduce pain by 50% to improve quality of life.   3.  Improve left hip flexion to 90 degrees.  LTG within 8 weeks:   1. Patient to be independent in ADLs/IADLs without difficulty.  2.  Improve hip strength of 4+ out of 5 in all planes  3.  Patient to ambulate tug in less than 13 seconds without assistive device.  4.  Patient to be independent in comprehensive HEP.    Plan  Patient would benefit from: skilled physical therapy and PT eval  Planned modality interventions: cryotherapy, hydrotherapy and unattended electrical stimulation    Planned therapy interventions: therapeutic training, therapeutic exercise, therapeutic activities, stretching, strengthening, postural training, patient education, neuromuscular re-education, manual  "therapy, joint mobilization, IADL retraining, activity modification, ADL retraining, ADL training, body mechanics training, flexibility, functional ROM exercises, gait training, graded activity, graded exercise, graded motor and home exercise program    Frequency: 2x week  Duration in weeks: 8  Plan of Care beginning date: 2024  Plan of Care expiration date: 2024  Treatment plan discussed with: patient    Subjective Evaluation    History of Present Illness  Mechanism of injury: Patient is a 74 y/o female s/p L YONATAN on 24. She had home health up until last Monday. She was d/c-ed from [facility] and referred to outpatient PT. She arrives today with SPC.  She denies AD use prior to surgery.  States she also has bilateral knee pain without relief from injections, which prevents her from doing activity as well.   Patient Goals  Patient goals for therapy: increased strength  Patient goal: Improve fatigue level  Pain  At best pain ratin  At worst pain ratin  Quality: dull ache  Relieving factors: ice  Exacerbated by: stairs, sitting too long.  Progression: improved    Social Support  Steps to enter house: yes  Stairs in house: yes   Lives in: multiple-level home  Lives with: spouse    Employment status: not working  Exercise history: does her hip exercises every day; prior to surgery she states she did \"all kinds of exercise\"      Diagnostic Tests  Abnormal x-ray: Unremarkable appearance of left total hip arthroplasty..  Treatments  Discharged from (in last 30 days): home health care      Objective     Active Range of Motion   Left Hip   Flexion: 85 degrees   Abduction: 20 degrees     Right Hip   Flexion: 100 degrees   Abduction: 13 degrees   External rotation (90/90): 18 degrees   Internal rotation (90/90): 18 degrees   Left Knee   Flexion: 120 degrees   Extension: -12 degrees     Right Knee   Flexion: 120 degrees   Extension: -10 degrees     Passive Range of Motion     Right Hip   Flexion: 120 " "degrees     Strength/Myotome Testing     Left Hip   Planes of Motion   Flexion: 4    Right Hip   Planes of Motion   Flexion: 4+    Additional Strength Details  Knee flexion: B 4+   Knee extension: B 4+     General Comments:      Hip Comments   Timed Up and Go: 13.59 sec              Precautions: high cholesterol, hypoglycemia, L YONATAN POSTERIOR PRECAUTIONS       POC expires Unit limit Auth Expiration date PT/OT/ST + Visit Limit?   8/30/24 6 required BOMN                           Visit/Unit Tracking  AUTH Status:  Date 7/5              required Used 1               Remaining                    [email or web address]  Access Code: [email or web address]      Increased time spent on patient education with diagnosis, prognosis, goals of therapy, progression of therapy, and plan of care.  All questions answered. Patient instructed to call clinic with questions or concerns.  Educated patient in hip precautions.     Manuals 7/5                                                                Neuro Re-Ed             bridge 2x10             Adductor set  3\"X20             HL hip abd  Red 2x10 ea                                                                Ther Ex             Pt Edu  KS            SLR  4x5 ea                                                                                           Ther Activity                                       Gait Training                                       Modalities                                            "

## 2024-07-09 ENCOUNTER — OFFICE VISIT (OUTPATIENT)
Dept: PHYSICAL THERAPY | Facility: CLINIC | Age: 74
End: 2024-07-09
Payer: COMMERCIAL

## 2024-07-09 DIAGNOSIS — Z47.1 AFTERCARE FOLLOWING LEFT HIP JOINT REPLACEMENT SURGERY: Primary | ICD-10-CM

## 2024-07-09 DIAGNOSIS — M16.12 PRIMARY OSTEOARTHRITIS OF LEFT HIP: ICD-10-CM

## 2024-07-09 DIAGNOSIS — Z96.642 AFTERCARE FOLLOWING LEFT HIP JOINT REPLACEMENT SURGERY: Primary | ICD-10-CM

## 2024-07-09 PROCEDURE — 97112 NEUROMUSCULAR REEDUCATION: CPT

## 2024-07-09 PROCEDURE — 97110 THERAPEUTIC EXERCISES: CPT

## 2024-07-09 PROCEDURE — 97530 THERAPEUTIC ACTIVITIES: CPT

## 2024-07-09 NOTE — PROGRESS NOTES
"Daily Note     Today's date: 2024  Patient name: Anisa Rowe  : 1950  MRN: [id number]  Referring provider: Jethro Ray,*  Dx:   Encounter Diagnosis     ICD-10-CM    1. Aftercare following left hip joint replacement surgery  Z47.1     Z96.642       2. Primary osteoarthritis of left hip  M16.12                      Subjective: Pt reports she is doing well.  Denies L hip pain upon arrival to session.  She reports her knee does not flex when she walks due to pain.      Objective: See treatment diary below      Assessment: Demonstrates decreased glute med strength and weakness.  Hip flexion compensation to complete sidelying hip abduction with use of pillow between the knees.  Patient is able to complete CKC strengthening without c/o knee pain.  She demonstrates knee valgus with mini squat utilizing hand rail assist.      Plan: Continue per plan of care.      Precautions: high cholesterol, hypoglycemia, L YONATAN POSTERIOR PRECAUTIONS       POC expires Unit limit Auth Expiration date PT/OT/ST + Visit Limit?   24 6 required BOMN                           Visit/Unit Tracking  AUTH Status:  Date              Approved - 18 visits Used 1 2              Remaining  17 16                 [email or web address]  Access Code: [email or web address]      Manuals                                                                Neuro Re-Ed             bridge 2x10  2x10           Adductor set  3\"X20             HL hip abd  Red 2x10 ea GTB 2x10           Biodex LOS  Static x1, L12 X3                                                  Ther Ex             Pt Edu  KS AF           PROM  AF           SLR  4x5 ea  2x10           S/l hip abd  2x10                                                                            Ther Activity             Step up  6\" 2x10           Mini squat  2x10           Gait Training                                       Modalities                                            "

## 2024-07-11 ENCOUNTER — OFFICE VISIT (OUTPATIENT)
Dept: PHYSICAL THERAPY | Facility: CLINIC | Age: 74
End: 2024-07-11
Payer: COMMERCIAL

## 2024-07-11 DIAGNOSIS — Z96.642 AFTERCARE FOLLOWING LEFT HIP JOINT REPLACEMENT SURGERY: Primary | ICD-10-CM

## 2024-07-11 DIAGNOSIS — Z47.1 AFTERCARE FOLLOWING LEFT HIP JOINT REPLACEMENT SURGERY: Primary | ICD-10-CM

## 2024-07-11 DIAGNOSIS — M16.12 PRIMARY OSTEOARTHRITIS OF LEFT HIP: ICD-10-CM

## 2024-07-11 PROCEDURE — 97110 THERAPEUTIC EXERCISES: CPT | Performed by: PHYSICAL THERAPIST

## 2024-07-11 PROCEDURE — 97112 NEUROMUSCULAR REEDUCATION: CPT | Performed by: PHYSICAL THERAPIST

## 2024-07-11 NOTE — PROGRESS NOTES
"Daily Note     Today's date: 2024  Patient name: Anisa Rowe  : 1950  MRN: [id number]  Referring provider: Jethro Ray,*  Dx:   Encounter Diagnosis     ICD-10-CM    1. Aftercare following left hip joint replacement surgery  Z47.1     Z96.642       2. Primary osteoarthritis of left hip  M16.12                      Subjective: Patient reports exercises are easier to do. She reports overall gradual improvements. \"I do so many things that I can't tell what's better. It's just all gradually better.\"       Objective: See treatment diary below      Assessment: Patient able to progress increased exercise this visit and updated HEP provided to patient.  Patient fatigues overall during session and requires some rest breaks during session.  No complaints post session.            Plan: Continue per plan of care.      Precautions: high cholesterol, hypoglycemia, L YONATAN POSTERIOR PRECAUTIONS       POC expires Unit limit Auth Expiration date PT/OT/ST + Visit Limit?   24 6 required BOMN                           Visit/Unit Tracking  AUTH Status:  Date              Approved - 18 visits Used 1 2 3             Remaining  17 16 15                [email or web address]  Access Code: [email or web address]      Manuals                                                                Neuro Re-Ed             bridge 2x10  2x10 2x10           Adductor set  3\"X20             HL hip abd  Red 2x10 ea GTB 2x10           Biodex LOS  Static x1, L12 X3 LV 12 x 3                                                  Ther Ex             Pt Edu  KS AF KS          PROM  AF KS          SLR  4x5 ea  2x10 2# x10           S/l hip abd  2x10 X10           Standing hip abd    Yel 2x10 ea                                                              Ther Activity             Step up  6\" 2x10 6\" 2x10           Mini squat  2x10 2x10           Step taps    X20                        Gait Training                                 "       Modalities

## 2024-07-16 ENCOUNTER — OFFICE VISIT (OUTPATIENT)
Dept: PHYSICAL THERAPY | Facility: CLINIC | Age: 74
End: 2024-07-16
Payer: COMMERCIAL

## 2024-07-16 DIAGNOSIS — M16.12 PRIMARY OSTEOARTHRITIS OF LEFT HIP: ICD-10-CM

## 2024-07-16 DIAGNOSIS — Z96.642 AFTERCARE FOLLOWING LEFT HIP JOINT REPLACEMENT SURGERY: Primary | ICD-10-CM

## 2024-07-16 DIAGNOSIS — Z47.1 AFTERCARE FOLLOWING LEFT HIP JOINT REPLACEMENT SURGERY: Primary | ICD-10-CM

## 2024-07-16 PROCEDURE — 97110 THERAPEUTIC EXERCISES: CPT

## 2024-07-16 PROCEDURE — 97530 THERAPEUTIC ACTIVITIES: CPT

## 2024-07-16 NOTE — PROGRESS NOTES
"Daily Note     Today's date: 2024  Patient name: Anisa Rowe  : 1950  MRN: [id number]  Referring provider: Jethro Ray,*  Dx:   Encounter Diagnosis     ICD-10-CM    1. Aftercare following left hip joint replacement surgery  Z47.1     Z96.642       2. Primary osteoarthritis of left hip  M16.12                      Subjective: Pt reports she was fatigued following last session.        Objective: See treatment diary below      Assessment: Demonstrates decreased glute med strength and is significantly challenged with sidelying hip abduction today.  Able to perform STS without UE.  After progressions, patient reports she felt she needed to eat due to hypoglycemia and has food in her car.  Declines hard candy in clinic.  Session ended early secondary to sx.      Plan: Continue per plan of care.      Precautions: high cholesterol, hypoglycemia, L YONATAN POSTERIOR PRECAUTIONS       POC expires Unit limit Auth Expiration date PT/OT/ST + Visit Limit?   24 6 required BOMN                           Visit/Unit Tracking  AUTH Status:  Date            Approved - 18 visits Used 1 2 3 4            Remaining  17 16 15 14               [email or web address]  Access Code: [email or web address]      Manuals                                                              Neuro Re-Ed             bridge 2x10  2x10 2x10           Adductor set  3\"X20             HL hip abd  Red 2x10 ea GTB 2x10           Biodex LOS  Static x1, L12 X3 LV 12 x 3  NV                                                Ther Ex             Pt Edu  KS AF KS AF         PROM  AF KS AF         SLR  4x5 ea  2x10 2# x10  3x10         S/l hip abd  2x10 X10  x5         Standing hip abd    Yel 2x10 ea YTB 2x10                                                             Ther Activity             Step up  6\" 2x10 6\" 2x10  6\" x20/fw/lat         Mini squat  2x10 2x10  2x10         Step taps    X20           STS    2x10       "   Gait Training                                       Modalities

## 2024-07-18 ENCOUNTER — OFFICE VISIT (OUTPATIENT)
Dept: PHYSICAL THERAPY | Facility: CLINIC | Age: 74
End: 2024-07-18
Payer: COMMERCIAL

## 2024-07-18 DIAGNOSIS — M16.12 PRIMARY OSTEOARTHRITIS OF LEFT HIP: ICD-10-CM

## 2024-07-18 DIAGNOSIS — Z47.1 AFTERCARE FOLLOWING LEFT HIP JOINT REPLACEMENT SURGERY: Primary | ICD-10-CM

## 2024-07-18 DIAGNOSIS — Z96.642 AFTERCARE FOLLOWING LEFT HIP JOINT REPLACEMENT SURGERY: Primary | ICD-10-CM

## 2024-07-18 PROCEDURE — 97530 THERAPEUTIC ACTIVITIES: CPT | Performed by: PHYSICAL THERAPIST

## 2024-07-18 PROCEDURE — 97110 THERAPEUTIC EXERCISES: CPT | Performed by: PHYSICAL THERAPIST

## 2024-07-18 NOTE — PROGRESS NOTES
"Daily Note     Today's date: 2024  Patient name: Anisa Rowe  : 1950  MRN: [id number]  Referring provider: Jethro Ray,*  Dx:   Encounter Diagnosis     ICD-10-CM    1. Aftercare following left hip joint replacement surgery  Z47.1     Z96.642       2. Primary osteoarthritis of left hip  M16.12                      Subjective: Pt states she always fatigues after therapy.       Objective: See treatment diary below      Assessment: Patient able to progress steps this visit.  No complaints of pain during or post session.  Appropriate stretch response with manual stretching.  Observation of incision; healing without signs of infection.       Plan: Continue per plan of care.      Precautions: high cholesterol, hypoglycemia, L YONATAN POSTERIOR PRECAUTIONS       POC expires Unit limit Auth Expiration date PT/OT/ST + Visit Limit?   24 6 required BOMN                           Visit/Unit Tracking  AUTH Status:  Date 7/5 7/9 7/11  7/16 7/18           Approved - 18 visits Used 1 2 3 4 5           Remaining  17 16 15 14 13              [email or web address]  Access Code: [email or web address]      Manuals                                                             Neuro Re-Ed             bridge 2x10  2x10 2x10           Adductor set  3\"X20             HL hip abd  Red 2x10 ea GTB 2x10           Biodex LOS  Static x1, L12 X3 LV 12 x 3  NV Lv 10 floor/foam x 3 ea                                               Ther Ex             Pt Edu  KS AF KS AF KS        PROM  AF KS AF KS        SLR  4x5 ea  2x10 2# x10  3x10 2# 2x10 ea        S/l hip abd  2x10 X10  x5 Home         Standing hip abd    Yel 2x10 ea YTB 2x10         Seated LAQ      3# 2x10 ea        Upright bike      5 min                                   Ther Activity             Step up  6\" 2x10 6\" 2x10  6\" x20/fw/lat         Step over- fwd/lat      6\" x20 ea         Mini squat  2x10 2x10  2x10 2x10         Step taps    X20           STS  "   2x10         Gait Training                                       Modalities

## 2024-07-22 ENCOUNTER — OFFICE VISIT (OUTPATIENT)
Dept: PHYSICAL THERAPY | Facility: CLINIC | Age: 74
End: 2024-07-22
Payer: COMMERCIAL

## 2024-07-22 DIAGNOSIS — M16.12 PRIMARY OSTEOARTHRITIS OF LEFT HIP: ICD-10-CM

## 2024-07-22 DIAGNOSIS — Z96.642 AFTERCARE FOLLOWING LEFT HIP JOINT REPLACEMENT SURGERY: Primary | ICD-10-CM

## 2024-07-22 DIAGNOSIS — Z47.1 AFTERCARE FOLLOWING LEFT HIP JOINT REPLACEMENT SURGERY: Primary | ICD-10-CM

## 2024-07-22 PROCEDURE — 97530 THERAPEUTIC ACTIVITIES: CPT

## 2024-07-22 PROCEDURE — 97110 THERAPEUTIC EXERCISES: CPT

## 2024-07-22 PROCEDURE — 97112 NEUROMUSCULAR REEDUCATION: CPT

## 2024-07-22 NOTE — PROGRESS NOTES
"Daily Note     Today's date: 2024  Patient name: Anisa Rowe  : 1950  MRN: [id number]  Referring provider: Jethro Ray,*  Dx:   Encounter Diagnosis     ICD-10-CM    1. Aftercare following left hip joint replacement surgery  Z47.1     Z96.642       2. Primary osteoarthritis of left hip  M16.12                      Subjective: Patient reports cont swelling and pain near incision.       Objective: See treatment diary below      Assessment: Tolerated treatment well. Hip flexor compensation despite cues with SL abd, did better with smaller sets. She has some SIJ discomfort after the ex. Added HL clamshells to address glute med weakness. Patient demonstrated fatigue post treatment and would benefit from continued PT      Plan: Progress treatment as tolerated.       Precautions: high cholesterol, hypoglycemia, L YONATAN POSTERIOR PRECAUTIONS       POC expires Unit limit Auth Expiration date PT/OT/ST + Visit Limit?   24 6 required BOMN                           Visit/Unit Tracking  AUTH Status:  Date          Approved - 18 visits Used 1 2 3 4 5 6          Remaining  17 16 15 14 13 12             [email or web address]  Access Code: [email or web address]      Manuals                                                            Neuro Re-Ed             bridge 2x10  2x10 2x10           Adductor set  3\"X20             HL hip abd  Red 2x10 ea GTB 2x10    RTB unilat 2x10 ea       Biodex LOS  Static x1, L12 X3 LV 12 x 3  NV Lv 10 floor/foam x 3 ea Lv 9 floor/foam x3 ea                                               Ther Ex             Pt Edu  KS AF KS AF KS IVY       PROM  AF KS AF KS IVY       SLR  4x5 ea  2x10 2# x10  3x10 2# 2x10 ea 2# 2x10 ea       S/l hip abd  2x10 X10  x5 Home  2x5       Standing hip abd    Yel 2x10 ea YTB 2x10         Seated LAQ      3# 2x10 ea 3# 2x10 ea        Upright bike      5 min  6 min                                 Ther Activity    " "         Step up  6\" 2x10 6\" 2x10  6\" x20/fw/lat         Step over- fwd/lat      6\" x20 ea  6\" x20 ea       Mini squat  2x10 2x10  2x10 2x10  2x10        Step taps    X20           STS    2x10         Gait Training                                       Modalities                                                "

## 2024-07-25 ENCOUNTER — OFFICE VISIT (OUTPATIENT)
Dept: PHYSICAL THERAPY | Facility: CLINIC | Age: 74
End: 2024-07-25
Payer: COMMERCIAL

## 2024-07-25 DIAGNOSIS — Z96.642 AFTERCARE FOLLOWING LEFT HIP JOINT REPLACEMENT SURGERY: Primary | ICD-10-CM

## 2024-07-25 DIAGNOSIS — Z47.1 AFTERCARE FOLLOWING LEFT HIP JOINT REPLACEMENT SURGERY: Primary | ICD-10-CM

## 2024-07-25 DIAGNOSIS — M16.12 PRIMARY OSTEOARTHRITIS OF LEFT HIP: ICD-10-CM

## 2024-07-25 PROCEDURE — 97110 THERAPEUTIC EXERCISES: CPT

## 2024-07-25 PROCEDURE — 97112 NEUROMUSCULAR REEDUCATION: CPT

## 2024-07-25 NOTE — PROGRESS NOTES
"Daily Note     Today's date: 2024  Patient name: Anisa Rowe  : 1950  MRN: [id number]  Referring provider: Jethro Ray,*  Dx:   Encounter Diagnosis     ICD-10-CM    1. Aftercare following left hip joint replacement surgery  Z47.1     Z96.642       2. Primary osteoarthritis of left hip  M16.12                      Subjective: Pt reports she was sore following last session.      Objective: See treatment diary below      Assessment: Gluteal weakness remains.  Patient remains challenged with overall hip strengthening.  Performs charted interventions without complications.  Pt would benefit from continued PT.      Plan: Continue per plan of care.      Precautions: high cholesterol, hypoglycemia, L YONATAN POSTERIOR PRECAUTIONS       POC expires Unit limit Auth Expiration date PT/OT/ST + Visit Limit?   24 6 required BOMN                           Visit/Unit Tracking  AUTH Status:  Date 7/5 7/9 7/11  7/16 7/18  7/22 7/25        Approved - 18 visits Used 1 2 3 4 5 6 7         Remaining  17 16 15 14 13 12 11            [email or web address]  Access Code: [email or web address]      FOTO             Manuals                                                           Neuro Re-Ed             bridge 2x10  2x10 2x10     3x10      Adductor set  3\"X20             HL hip abd  Red 2x10 ea GTB 2x10    RTB unilat 2x10 ea RTB x20      Biodex LOS  Static x1, L12 X3 LV 12 x 3  NV Lv 10 floor/foam x 3 ea Lv 9 floor/foam x3 ea  L9 x4                                             Ther Ex             Pt Edu  KS AF KS AF KS IVY AF      PROM  AF KS AF KS IVY AF      SLR  4x5 ea  2x10 2# x10  3x10 2# 2x10 ea 2# 2x10 ea 2# 2x10      S/l hip abd  2x10 X10  x5 Home  2x5       Standing hip abd    Yel 2x10 ea YTB 2x10   & ext YTB 2x10      Seated LAQ      3# 2x10 ea 3# 2x10 ea  3# 2x10      Upright bike      5 min  6 min 6 min                                Ther Activity             Step up  6\" 2x10 6\" 2x10 " " 6\" x20/fw/lat         Step over- fwd/lat      6\" x20 ea  6\" x20 ea Biodex x20      Mini squat  2x10 2x10  2x10 2x10  2x10        Step taps    X20           STS    2x10         Gait Training                                       Modalities                                                  "

## 2024-07-26 ENCOUNTER — APPOINTMENT (OUTPATIENT)
Dept: RADIOLOGY | Facility: MEDICAL CENTER | Age: 74
End: 2024-07-26
Payer: COMMERCIAL

## 2024-07-26 ENCOUNTER — OFFICE VISIT (OUTPATIENT)
Dept: OBGYN CLINIC | Facility: MEDICAL CENTER | Age: 74
End: 2024-07-26

## 2024-07-26 VITALS
HEART RATE: 67 BPM | BODY MASS INDEX: 26.28 KG/M2 | SYSTOLIC BLOOD PRESSURE: 135 MMHG | HEIGHT: 68 IN | WEIGHT: 173.4 LBS | DIASTOLIC BLOOD PRESSURE: 77 MMHG

## 2024-07-26 DIAGNOSIS — M17.0 PRIMARY OSTEOARTHRITIS OF BOTH KNEES: ICD-10-CM

## 2024-07-26 DIAGNOSIS — Z47.1 AFTERCARE FOLLOWING LEFT HIP JOINT REPLACEMENT SURGERY: Primary | ICD-10-CM

## 2024-07-26 DIAGNOSIS — Z96.642 AFTERCARE FOLLOWING LEFT HIP JOINT REPLACEMENT SURGERY: Primary | ICD-10-CM

## 2024-07-26 PROCEDURE — 99024 POSTOP FOLLOW-UP VISIT: CPT | Performed by: ORTHOPAEDIC SURGERY

## 2024-07-26 PROCEDURE — 73502 X-RAY EXAM HIP UNI 2-3 VIEWS: CPT

## 2024-07-26 NOTE — PROGRESS NOTES
"Assessment/Plan:  1. Aftercare following left hip joint replacement surgery  XR hip/pelv 2-3 vws left if performed    Ambulatory Referral to Physical Therapy      2. Primary osteoarthritis of both knees          Scribe Attestation      I,:  Mendoza Gomez am acting as a scribe while in the presence of the attending physician.:       I,:  Jethro Ray MD personally performed the services described in this documentation    as scribed in my presence.:           Anisa upon examination and review of the x-rays of the left hip demonstrates appropriate recovery now 3 months status post left total hip arthroplasty.  X-rays demonstrate stable appearance of the total hip prosthesis without evidence of lucency or implant failure.  She does demonstrate improvement with her strength and does not have any pain with passive range of motion of the hip.  I did encourage her to continue physical therapy to work on strengthening as she continues to recover with the hopes of becoming functional enough to address her severe underlying osteoarthritis of her knees.  Anisa verbalized understanding and will continue physical therapy for her left hip.  She will follow-up in 3 months time at that time we will reassess her function of her left hip and further delineation of care for her knees will be discussed at that time.      Subjective:   Anisa Rowe is a 73 y.o. female who presents for postop evaluation of her left hip.  She is 3 months status post left total hip arthroplasty.  She states that she is doing well with regards to her hip.  She denies any groin pain.  However, she does note she has pain to the lateral aspect of her hip extending down her leg.  She does also appreciate swelling and feels a \"pressure of the knee and foot\".  She notes that she does feel that she is progressing with physical therapy but notes that she is quite sore following her sessions and does have to lay down.  She is trying to improve her activity " level at home and states that she has been compliant with hip precautions.  Today she denies any distal paresthesias.  She states that her knee pain due to her underlying severe osteoarthritis does seem to be delaying her overall recovery with regards to her hip.  She does wish to have these addressed once she has recovered enough from her left hip.      Review of Systems   Constitutional:  Negative for chills, fever and unexpected weight change.   HENT:  Negative for hearing loss, nosebleeds and sore throat.    Eyes:  Negative for pain, redness and visual disturbance.   Respiratory:  Negative for cough, shortness of breath and wheezing.    Cardiovascular:  Negative for chest pain, palpitations and leg swelling.   Gastrointestinal:  Negative for abdominal pain, nausea and vomiting.   Endocrine: Negative for polydipsia and polyuria.   Genitourinary:  Negative for dysuria and hematuria.   Musculoskeletal:  Positive for arthralgias and myalgias.        See HPI   Skin:  Negative for rash and wound.   Neurological:  Negative for dizziness, numbness and headaches.   Psychiatric/Behavioral:  Negative for decreased concentration and suicidal ideas. The patient is not nervous/anxious.          Past Medical History:   Diagnosis Date    Arthritis     stiff knees    Exercises daily     Female bladder prolapse     VEC today 11/16/2020     pelvic repair 3/2020    Hyperlipidemia     borderline    Hypoglycemia     Lyme disease     3 years ago    Migraine     not recent    Osteoporosis     borderline    Urinary incontinence     at times    Urinary urgency     Uterine prolapse     Wears dentures     bridge lower    Wears glasses        Past Surgical History:   Procedure Laterality Date    NV ARTHRP ACETBLR/PROX FEM PROSTC AGRFT/ALGRFT Left 4/29/2024    Procedure: ARTHROPLASTY HIP TOTAL;  Surgeon: Jethro Ray MD;  [facility];  Service: Orthopedics    NV CMBND ANTERPOST COLPORRAPHY W/CYSTO N/A 11/16/2020     "Procedure: ANT COLPORRHAPHY;  Surgeon: Jerrell Zhu MD;  [facility];  Service: UroGynecology           AL COLPOPEXY VAGINAL EXTRAPERITONEAL APPROACH N/A 3/9/2020    Procedure: V.E.C.(ENPLACE);  Surgeon: Jerrell Zhu MD;  [facility];  Service: UroGynecology           AL COLPOPEXY VAGINAL EXTRAPERITONEAL APPROACH N/A 11/16/2020    Procedure: V.E.C. w/ enplace;  Surgeon: Jerrell Zhu MD;  [facility];  Service: UroGynecology           AL CYSTOURETHROSCOPY N/A 3/9/2020    Procedure: CYSTOSCOPY;  Surgeon: Jerrell Zhu MD;  [facility];  Service: UroGynecology           AL CYSTOURETHROSCOPY N/A 11/16/2020    Procedure: CYSTOSCOPY;  Surgeon: Jerrell Zhu MD;  [facility];  Service: UroGynecology           AL POST COLPORRHAPHY RECTOCELE W/WO PERINEORRHAPHY N/A 3/9/2020    Procedure: ANTERIOR AND POST COLPORRHAPHY;  Surgeon: Jerrell Zhu MD;  [facility];  Service: UroGynecology           AL SLING OPERATION STRESS INCONTINENCE N/A 3/9/2020    Procedure: SINGLE INCISION SLING;  Surgeon: Jerrell Zhu MD;  [facility];  Service: UroGynecology           VAGINAL DELIVERY      \"47 yrs ago did have sedation\"    WISDOM TOOTH EXTRACTION         Family History   Problem Relation Age of Onset    Osteoporosis Mother     Stroke Father     Diabetes Father     Diabetes Sister        Social History     Occupational History    Occupation: [employer]     Comment: retired   Tobacco Use    Smoking status: Never    Smokeless tobacco: Never   Vaping Use    Vaping status: Never Used   Substance and Sexual Activity    Alcohol use: Never    Drug use: No    Sexual activity: Not Currently         Current Outpatient Medications:     ALPHA LIPOIC ACID PO, Take by mouth, Disp: , Rfl:     Bromelains (BROMELAIN PO), Take by mouth, Disp: , Rfl:     cholecalciferol (VITAMIN D3) 1,000 units tablet, Take 1,000 Units by mouth daily, Disp: , Rfl:     Coenzyme Q10 (COQ10 " "PO), Take by mouth, Disp: , Rfl:     Multiple Vitamins-Minerals (multivitamin with minerals) tablet, Take 1 tablet by mouth daily, Disp: 30 tablet, Rfl: 2    QUERCETIN PO, Take by mouth, Disp: , Rfl:     enoxaparin (LOVENOX) 40 mg/0.4 mL, Inject 0.4 mL (40 mg total) under the skin daily for 28 days To start Post-Op, Disp: 11.2 mL, Rfl: 0    traMADol (Ultram) 50 mg tablet, Take 1 tablet (50 mg total) by mouth every 6 (six) hours as needed for moderate pain (Patient not taking: Reported on 7/5/2024), Disp: 20 tablet, Rfl: 0    Allergies   Allergen Reactions    Other Nausea Only and Headache     Chemicals and perfumes    Wheat Bran - Food Allergy Diarrhea     \"just wheat\"       Objective:  Vitals:    07/26/24 1037   BP: 135/77   Pulse: 67       Left Hip Exam     Tenderness   The patient is experiencing no tenderness.     Range of Motion   Abduction:  45   Extension:  0   Flexion:  90   External rotation:  50   Internal rotation: 30     Muscle Strength   Abduction: 5/5   Adduction: 5/5   Flexion: 4/5     Other   Erythema: absent  Scars: present (well healed lateral hip incision)  Sensation: normal  Pulse: present            Physical Exam  Vitals and nursing note reviewed.   Constitutional:       Appearance: She is well-developed.   HENT:      Head: Normocephalic and atraumatic.      Right Ear: External ear normal.      Left Ear: External ear normal.      Nose: Nose normal.   Eyes:      General:         Right eye: No discharge.         Left eye: No discharge.      Conjunctiva/sclera: Conjunctivae normal.   Cardiovascular:      Rate and Rhythm: Normal rate.   Pulmonary:      Effort: Pulmonary effort is normal. No respiratory distress.   Musculoskeletal:      Cervical back: Normal range of motion and neck supple.      Comments: As noted in ortho exam   Skin:     General: Skin is warm and dry.   Neurological:      Mental Status: She is alert and oriented to person, place, and time.   Psychiatric:         Behavior: Behavior " normal.         Thought Content: Thought content normal.         Judgment: Judgment normal.         I have personally reviewed pertinent films in PACS and my interpretation is as follows:    X-rays of the left hip demonstrates a stable appearing and appropriate position total hip arthroplasty prosthesis.  No evidence of implant failure or loosening.    X-rays of the left knee demonstrate severe tricompartmental osteoarthritis with bone-on-bone presentation, subchondral sclerosis and large osteophytes.  Most notably medially    X-rays of the right knee demonstrate severe tricompartmental osteoarthritis with bone-on-bone presentation, subchondral sclerosis and osteophytosis      This document was created using speech voice recognition software.   Grammatical errors, random word insertions, pronoun errors, and incomplete sentences are an occasional consequence of this system due to software limitations, ambient noise, and hardware issues.   Any formal questions or concerns about content, text, or information contained within the body of this dictation should be directly addressed to the provider for clarification.

## 2024-07-29 ENCOUNTER — OFFICE VISIT (OUTPATIENT)
Dept: PHYSICAL THERAPY | Facility: CLINIC | Age: 74
End: 2024-07-29
Payer: COMMERCIAL

## 2024-07-29 DIAGNOSIS — M16.12 PRIMARY OSTEOARTHRITIS OF LEFT HIP: ICD-10-CM

## 2024-07-29 DIAGNOSIS — Z47.1 AFTERCARE FOLLOWING LEFT HIP JOINT REPLACEMENT SURGERY: Primary | ICD-10-CM

## 2024-07-29 DIAGNOSIS — Z96.642 AFTERCARE FOLLOWING LEFT HIP JOINT REPLACEMENT SURGERY: Primary | ICD-10-CM

## 2024-07-29 PROCEDURE — 97112 NEUROMUSCULAR REEDUCATION: CPT | Performed by: PHYSICAL THERAPIST

## 2024-07-29 PROCEDURE — 97110 THERAPEUTIC EXERCISES: CPT | Performed by: PHYSICAL THERAPIST

## 2024-07-29 NOTE — PROGRESS NOTES
"Daily Note     Today's date: 2024  Patient name: Anisa Rowe  : 1950  MRN: [id number]  Referring provider: Jethro Ray,*  Dx:   Encounter Diagnosis     ICD-10-CM    1. Aftercare following left hip joint replacement surgery  Z47.1     Z96.642       2. Primary osteoarthritis of left hip  M16.12                      Subjective: Pt states she saw physician who was pleased with her progress and told her she is doing well.  She states, \"I am doing better, but it is hard to tell because my knees are bothering me.\"      Objective: See treatment diary below      Assessment: Patient able to progress exercise program this visit to include red Thera-Band for standing hip abduction and extension and she adds side-lying clamshell and leg press.  Pt would benefit from continued PT, with focus on higher level balance and glute strengthening.         Plan: Continue per plan of care.      Precautions: high cholesterol, hypoglycemia, L YONATAN POSTERIOR PRECAUTIONS       POC expires Unit limit Auth Expiration date PT/OT/ST + Visit Limit?   24 6 required BOMN                           Visit/Unit Tracking  AUTH Status:  Date        Approved - 18 visits Used 1 2 3 4 5 6 7 8        Remaining  17 16 15 14 13 12 11 10           [email or web address]  Access Code: [email or web address]      FOTO             Manuals                                                          Neuro Re-Ed             bridge 2x10  2x10 2x10     3x10      Adductor set  3\"X20             HL hip abd  Red 2x10 ea GTB 2x10    RTB unilat 2x10 ea RTB x20      Biodex LOS  Static x1, L12 X3 LV 12 x 3  NV Lv 10 floor/foam x 3 ea Lv 9 floor/foam x3 ea  L9 x4 L9 x5      SL clam         3\" x15                               Ther Ex             Pt Edu  KS AF KS AF KS IVY AF KS     PROM  AF KS AF KS IVY AF Hip abd, hip flex, HS stretch KS     SLR  4x5 ea  2x10 2# x10  3x10 2# 2x10 ea 2# " "2x10 ea 2# 2x10      S/l hip abd  2x10 X10  x5 Home  2x5       Standing hip abd    Yel 2x10 ea YTB 2x10   & ext YTB 2x10 & ext red 2x10 ea      Seated LAQ      3# 2x10 ea 3# 2x10 ea  3# 2x10      Upright bike      5 min  6 min 6 min 8 min      Standing march 2# 2x10                   Ther Activity             Step up  6\" 2x10 6\" 2x10  6\" x20/fw/lat    Biodex 2x10 ea fwd/side      Step over- fwd/lat      6\" x20 ea  6\" x20 ea Biodex x20      Mini squat  2x10 2x10  2x10 2x10  2x10        Step taps    X20           STS    2x10         Gait Training                                       Modalities                                                  "

## 2024-07-30 NOTE — ED NOTES
Patient transported to [address] [place], [place] [address]bety Soto RN  12/30/21 [id number]
Pt will need a lyft home as her family is no longer able to pick her up d/t late hour and prolonged wait       China Vicente RN  12/30/21 9849
wound check

## 2024-08-01 ENCOUNTER — OFFICE VISIT (OUTPATIENT)
Dept: PHYSICAL THERAPY | Facility: CLINIC | Age: 74
End: 2024-08-01
Payer: COMMERCIAL

## 2024-08-01 DIAGNOSIS — Z47.1 AFTERCARE FOLLOWING LEFT HIP JOINT REPLACEMENT SURGERY: Primary | ICD-10-CM

## 2024-08-01 DIAGNOSIS — M16.12 PRIMARY OSTEOARTHRITIS OF LEFT HIP: ICD-10-CM

## 2024-08-01 DIAGNOSIS — Z96.642 AFTERCARE FOLLOWING LEFT HIP JOINT REPLACEMENT SURGERY: Primary | ICD-10-CM

## 2024-08-01 PROCEDURE — 97110 THERAPEUTIC EXERCISES: CPT

## 2024-08-01 PROCEDURE — 97112 NEUROMUSCULAR REEDUCATION: CPT

## 2024-08-01 NOTE — PROGRESS NOTES
"Daily Note     Today's date: 2024  Patient name: Anisa Rowe  : 1950  MRN: [id number]  Referring provider: Jethro Ray,*  Dx:   Encounter Diagnosis     ICD-10-CM    1. Aftercare following left hip joint replacement surgery  Z47.1     Z96.642       2. Primary osteoarthritis of left hip  M16.12                      Subjective: Pt offers no new complaints upon arrival.      Objective: See treatment diary below      Assessment: Patient completes step up with hand rail assist without knee pain today.  She continues to have difficulty with gluteal strengthening and demonstrates fatigue following completion.  Patient continues to progress well.      Plan: Continue per plan of care.      Precautions: high cholesterol, hypoglycemia, L YONATAN POSTERIOR PRECAUTIONS       POC expires Unit limit Auth Expiration date PT/OT/ST + Visit Limit?   24 6 required BOMN                           Visit/Unit Tracking  AUTH Status:  Date       Approved - 18 visits Used 1 2 3 4 5 6 7 8 9       Remaining  17 16 15 14 13 12 11 10 9          [email or web address]  Access Code: [email or web address]      FOTO       65/69      Manuals                                                         Neuro Re-Ed             bridge 2x10  2x10 2x10     3x10      Adductor set  3\"X20             HL hip abd  Red 2x10 ea GTB 2x10    RTB unilat 2x10 ea RTB x20      Biodex LOS  Static x1, L12 X3 LV 12 x 3  NV Lv 10 floor/foam x 3 ea Lv 9 floor/foam x3 ea  L9 x4 L9 x5  L9 x4    SL clam         3\" x15 3\" 2x10                              Ther Ex             Pt Edu  KS AF KS AF KS IVY AF KS AF    PROM  AF KS AF KS IVY AF Hip abd, hip flex, HS stretch KS AF    SLR  4x5 ea  2x10 2# x10  3x10 2# 2x10 ea 2# 2x10 ea 2# 2x10      S/l hip abd  2x10 X10  x5 Home  2x5       Standing hip abd    Yel 2x10 ea YTB 2x10   & ext YTB 2x10 & ext red 2x10 ea  & ext RTB 2x10    Seated LAQ  " "    3# 2x10 ea 3# 2x10 ea  3# 2x10      Upright bike      5 min  6 min 6 min 8 min  8 min    Standing march         2# 2x10  2# 2x10                 Ther Activity             Step up  6\" 2x10 6\" 2x10  6\" x20/fw/lat    Biodex 2x10 ea fwd/side  Biodex 2x10 ea    Step over- fwd/lat      6\" x20 ea  6\" x20 ea Biodex x20      Mini squat  2x10 2x10  2x10 2x10  2x10        Step taps    X20           STS    2x10         Gait Training                                       Modalities                                                    "

## 2024-08-07 ENCOUNTER — OFFICE VISIT (OUTPATIENT)
Dept: PHYSICAL THERAPY | Facility: CLINIC | Age: 74
End: 2024-08-07
Payer: COMMERCIAL

## 2024-08-07 DIAGNOSIS — Z47.1 AFTERCARE FOLLOWING LEFT HIP JOINT REPLACEMENT SURGERY: Primary | ICD-10-CM

## 2024-08-07 DIAGNOSIS — M16.12 PRIMARY OSTEOARTHRITIS OF LEFT HIP: ICD-10-CM

## 2024-08-07 DIAGNOSIS — Z96.642 AFTERCARE FOLLOWING LEFT HIP JOINT REPLACEMENT SURGERY: Primary | ICD-10-CM

## 2024-08-07 PROCEDURE — 97112 NEUROMUSCULAR REEDUCATION: CPT

## 2024-08-07 PROCEDURE — 97110 THERAPEUTIC EXERCISES: CPT

## 2024-08-09 ENCOUNTER — APPOINTMENT (OUTPATIENT)
Dept: PHYSICAL THERAPY | Facility: CLINIC | Age: 74
End: 2024-08-09
Payer: COMMERCIAL

## 2024-08-13 ENCOUNTER — APPOINTMENT (OUTPATIENT)
Dept: PHYSICAL THERAPY | Facility: CLINIC | Age: 74
End: 2024-08-13
Payer: COMMERCIAL

## 2024-08-15 ENCOUNTER — EVALUATION (OUTPATIENT)
Dept: PHYSICAL THERAPY | Facility: CLINIC | Age: 74
End: 2024-08-15
Payer: COMMERCIAL

## 2024-08-15 DIAGNOSIS — Z47.1 AFTERCARE FOLLOWING LEFT HIP JOINT REPLACEMENT SURGERY: Primary | ICD-10-CM

## 2024-08-15 DIAGNOSIS — Z96.642 AFTERCARE FOLLOWING LEFT HIP JOINT REPLACEMENT SURGERY: Primary | ICD-10-CM

## 2024-08-15 DIAGNOSIS — M16.12 PRIMARY OSTEOARTHRITIS OF LEFT HIP: ICD-10-CM

## 2024-08-15 PROCEDURE — 97110 THERAPEUTIC EXERCISES: CPT | Performed by: PHYSICAL THERAPIST

## 2024-08-15 PROCEDURE — 97530 THERAPEUTIC ACTIVITIES: CPT | Performed by: PHYSICAL THERAPIST

## 2024-08-15 NOTE — PROGRESS NOTES
PT Re-Evaluation     Today's date: 8/15/2024  Patient name: Anisa Rowe  : 1950  MRN: [id number]  Referring provider: Jethro Ray,*  Dx:   Encounter Diagnosis     ICD-10-CM    1. Aftercare following left hip joint replacement surgery  Z47.1     Z96.642       2. Primary osteoarthritis of left hip  M16.12                      Assessment  Impairments: abnormal or restricted ROM, activity intolerance, impaired physical strength, lacks appropriate home exercise program and pain with function    Assessment details: Patient is a 73-year-old female status post left total hip arthroplasty with posterior precautions on 2024.  Since starting therapy, she presents with decreased pain, improved hip ROM, and improved strength.  She remains with decreased functional mobility due to decreased hip strength and endurance and gait deviations requiring cane.   Patient will benefit from continued skilled physical therapy to address impairment and improve functional mobility.  Will continue at a frequency of 1 time per week.  PT needed to allow for return to maximal function and improve quality of life.   Understanding of Dx/Px/POC: good     Prognosis: good    Goals  STG within 4 weeks:   1. Patient to be independent in HEP.  MET  2. Reduce pain by 50% to improve quality of life.  MET  3.  Improve left hip flexion to 90 degrees. MET   LTG within 8 weeks:   1. Patient to be independent in ADLs/IADLs without difficulty. IN PROGRESS  2.  Improve hip strength of 4+ out of 5 in all planes IN PROGRESS  3.  Patient to ambulate tug in less than 13 seconds without assistive device. TO BE ASSESSED   4.  Patient to be independent in comprehensive HEP.    Plan  Patient would benefit from: skilled physical therapy and PT eval  Planned modality interventions: cryotherapy, hydrotherapy and unattended electrical stimulation    Planned therapy interventions: therapeutic training, therapeutic exercise, therapeutic  "activities, stretching, strengthening, postural training, patient education, neuromuscular re-education, manual therapy, joint mobilization, IADL retraining, activity modification, ADL retraining, ADL training, body mechanics training, flexibility, functional ROM exercises, gait training, graded activity, graded exercise, graded motor and home exercise program    Frequency: 1x week  Duration in weeks: 5  Plan of Care beginning date: 8/15/2024  Plan of Care expiration date: 2024  Treatment plan discussed with: patient    Subjective Evaluation    History of Present Illness  Mechanism of injury: Patient is a 74 y/o female s/p L YONATAN on 24. She had home health up until last Monday. She was d/c-ed from home health and referred to outpatient PT. She arrives today with SPC.  She denies AD use prior to surgery.  States she also has bilateral knee pain without relief from injections, which prevents her from doing activity as well.     8/15: Patient states she isn't sure if she's improving. \"It's hard to tell!\" However, she notes that she's able to tend to her vegetable and flower garden and take care of her .  She states her knees still really bother her, so that limits her mobility.  She will follow up with physician regarding her knees in three months.   Patient Goals  Patient goals for therapy: increased strength  Patient goal: Improve fatigue level  Pain  At best pain ratin  At worst pain ratin  Quality: dull ache  Relieving factors: ice  Exacerbated by: stairs, sitting too long.  Progression: improved    Social Support  Steps to enter house: yes  Stairs in house: yes   Lives in: multiple-level home  Lives with: spouse    Employment status: not working  Exercise history: does her hip exercises every day; prior to surgery she states she did \"all kinds of exercise\"      Diagnostic Tests  Abnormal x-ray: Unremarkable appearance of left total hip arthroplasty..  Treatments  Discharged from (in last 30 " "days): home health care      Objective     Active Range of Motion   Left Hip   Flexion: 90 degrees   Abduction: 20 degrees     Right Hip   Flexion: 100 degrees   Abduction: 13 degrees   External rotation (90/90): 18 degrees   Internal rotation (90/90): 18 degrees   Left Knee   Flexion: 120 degrees   Extension: -12 degrees     Right Knee   Flexion: 120 degrees   Extension: -10 degrees     Passive Range of Motion     Right Hip   Flexion: 120 degrees     Strength/Myotome Testing     Left Hip   Planes of Motion   Flexion: 4+  Extension: 3+  Abduction: 4-    Right Hip   Planes of Motion   Flexion: 4+  Extension: 3+    Additional Strength Details  Knee flexion: B 5  Knee extension: B 5    General Comments:      Hip Comments   Timed Up and Go: 13.59 sec     TO BE UPDATED NEXT VISIT              Precautions: high cholesterol, hypoglycemia, L YONATAN POSTERIOR PRECAUTIONS     POC expires Unit limit Auth Expiration date PT/OT/ST + Visit Limit?   9/19/24 6 required BOMN                           Visit/Unit Tracking  AUTH Status:  Date 7/5 7/9 7/11  7/16 7/18  7/22 7/25 7/29 8/1 8/7 8/15    Approved - 18 visits Used 1 2 3 4 5 6 7 8 9 10 11     Remaining  17 16 15 14 13 12 11 10 9 8 7        [email or web address]  Access Code: [email or web address]      FOTO       65/69      Manuals 8/15   7/16 7/18 7/22 7/25 7/29 8/1 8/7                                                       Neuro Re-Ed             bridge       3x10   W/ iso add :03  20x   Adductor set              HL hip abd       RTB unilat 2x10 ea RTB x20      Biodex LOS Foam Lv 9 x6    NV Lv 10 floor/foam x 3 ea Lv 9 floor/foam x3 ea  L9 x4 L9 x5  L9 x4 L8  4x   SL clam         3\" x15 3\" 2x10 :03  10x2                             Ther Ex             Pt Edu  & re-eval KS    AF KS IVY AF KS AF LA   PROM    AF KS IVY AF Hip abd, hip flex, HS stretch KS AF LA   SLR     3x10 2# 2x10 ea 2# 2x10 ea 2# 2x10   2#  10x2   S/l hip abd    x5 Home  2x5       Standing hip abd  Green 2x10 ea    YTB 2x10  " " & ext YTB 2x10 & ext red 2x10 ea  & ext RTB 2x10 Flex, ext & abd  RTB   20x each    Seated LAQ      3# 2x10 ea 3# 2x10 ea  3# 2x10      Upright bike  8 min    5 min  6 min 6 min 8 min  8 min 6'   Standing march         2# 2x10  2# 2x10                 Ther Activity             Step up Biodex x10 ea leg fwd/side    6\" x20/fw/lat    Biodex 2x10 ea fwd/side  Biodex 2x10 ea Biodex  Fwd/lat  10x2 each   Step over- fwd/lat      6\" x20 ea  6\" x20 ea Biodex x20      Mini squat 2x10    2x10 2x10  2x10     10x2   Step taps              STS    2x10      Chair w/ foam  10x2   Gait Training             Timed Up and Go  NV                         Modalities                                           "

## 2024-08-22 ENCOUNTER — OFFICE VISIT (OUTPATIENT)
Dept: PHYSICAL THERAPY | Facility: CLINIC | Age: 74
End: 2024-08-22
Payer: COMMERCIAL

## 2024-08-22 DIAGNOSIS — M16.12 PRIMARY OSTEOARTHRITIS OF LEFT HIP: ICD-10-CM

## 2024-08-22 DIAGNOSIS — Z47.1 AFTERCARE FOLLOWING LEFT HIP JOINT REPLACEMENT SURGERY: Primary | ICD-10-CM

## 2024-08-22 DIAGNOSIS — Z96.642 AFTERCARE FOLLOWING LEFT HIP JOINT REPLACEMENT SURGERY: Primary | ICD-10-CM

## 2024-08-22 PROCEDURE — 97112 NEUROMUSCULAR REEDUCATION: CPT

## 2024-08-22 PROCEDURE — 97110 THERAPEUTIC EXERCISES: CPT

## 2024-08-22 NOTE — PROGRESS NOTES
"Daily Note     Today's date: 2024  Patient name: Anisa Rowe  : 1950  MRN: [id number]  Referring provider: Jethro Ray,*  Dx:   Encounter Diagnosis     ICD-10-CM    1. Aftercare following left hip joint replacement surgery  Z47.1     Z96.642       2. Primary osteoarthritis of left hip  M16.12                      Subjective: Pt reports her primary complaint is b/l knee pain.  She reports some sensitivity in lateral L hip still, but otherwise no complaints regarding the hip.      Objective: See treatment diary below      Assessment: Glute med weakness remains.  Pt is able to complete charted interventions despite knee discomfort.  Pt is progressing well overall with charted interventions.  Pt would benefit from continued PT.      Plan: Continue per plan of care.      Precautions: high cholesterol, hypoglycemia, L YONATAN POSTERIOR PRECAUTIONS     POC expires Unit limit Auth Expiration date PT/OT/ST + Visit Limit?   24 6 required BOMN                           Visit/Unit Tracking  AUTH Status:  Date 7/5 7/9 7/11  7/16 7/18  7/22 7/25 7/29 8/1 8/7 8/15 8/22   Approved - 18 visits Used 1 2 3 4 5 6 7 8 9 10 11 12    Remaining  17 16 15 14 13 12 11 10 9 8 7 6       [email or web address]  Access Code: [email or web address]      FOTO       65/69      Manuals 8/15 8/22  7/16 7/18 7/22 7/25 7/29 8/1 8/7                                                       Neuro Re-Ed             bridge       3x10   W/ iso add :03  20x   Adductor set              HL hip abd       RTB unilat 2x10 ea RTB x20      Biodex LOS Foam Lv 9 x6  Foam L9 x5  NV Lv 10 floor/foam x 3 ea Lv 9 floor/foam x3 ea  L9 x4 L9 x5  L9 x4 L8  4x   SL clam         3\" x15 3\" 2x10 :03  10x2   SLS  10\"x5                        Ther Ex             Pt Edu  & re-eval KS  AF  AF KS IVY AF KS AF LA   PROM  AF  AF KS IVY AF Hip abd, hip flex, HS stretch KS AF LA   SLR   2# 2x10  3x10 2# 2x10 ea 2# 2x10 ea 2# 2x10   2#  10x2   S/l hip abd  2x10  x5 Home  2x5    " "   Standing hip abd  Green 2x10 ea  GTB 2x12  YTB 2x10   & ext YTB 2x10 & ext red 2x10 ea  & ext RTB 2x10 Flex, ext & abd  RTB   20x each    Seated LAQ      3# 2x10 ea 3# 2x10 ea  3# 2x10      Upright bike  8 min 6'   5 min  6 min 6 min 8 min  8 min 6'   Standing march         2# 2x10  2# 2x10    Lateral walking  GTB 3 laps           Ther Activity             Step up Biodex x10 ea leg fwd/side  8\" 2x10 fw  6\" x20/fw/lat    Biodex 2x10 ea fwd/side  Biodex 2x10 ea Biodex  Fwd/lat  10x2 each   Step over- fwd/lat      6\" x20 ea  6\" x20 ea Biodex x20      Mini squat 2x10    2x10 2x10  2x10     10x2   Step taps              STS    2x10      Chair w/ foam  10x2   Gait Training               Timed Up and Go   NV                                          Modalities                                                                           "

## 2024-08-28 ENCOUNTER — APPOINTMENT (OUTPATIENT)
Dept: PHYSICAL THERAPY | Facility: CLINIC | Age: 74
End: 2024-08-28
Payer: COMMERCIAL

## 2024-08-28 ENCOUNTER — APPOINTMENT (EMERGENCY)
Dept: CT IMAGING | Facility: HOSPITAL | Age: 74
End: 2024-08-28
Payer: COMMERCIAL

## 2024-08-28 ENCOUNTER — HOSPITAL ENCOUNTER (EMERGENCY)
Facility: HOSPITAL | Age: 74
Discharge: HOME | End: 2024-08-28
Attending: EMERGENCY MEDICINE
Payer: COMMERCIAL

## 2024-08-28 ENCOUNTER — APPOINTMENT (EMERGENCY)
Dept: RADIOLOGY | Facility: HOSPITAL | Age: 74
End: 2024-08-28
Payer: COMMERCIAL

## 2024-08-28 VITALS
DIASTOLIC BLOOD PRESSURE: 57 MMHG | OXYGEN SATURATION: 95 % | HEART RATE: 76 BPM | TEMPERATURE: 98 F | SYSTOLIC BLOOD PRESSURE: 119 MMHG | RESPIRATION RATE: 18 BRPM

## 2024-08-28 DIAGNOSIS — R11.2 NAUSEA VOMITING AND DIARRHEA: ICD-10-CM

## 2024-08-28 DIAGNOSIS — U07.1 COVID-19 VIRUS INFECTION: ICD-10-CM

## 2024-08-28 DIAGNOSIS — R19.7 NAUSEA VOMITING AND DIARRHEA: ICD-10-CM

## 2024-08-28 DIAGNOSIS — R68.89 FLU-LIKE SYMPTOMS: Primary | ICD-10-CM

## 2024-08-28 DIAGNOSIS — J06.9 UPPER RESPIRATORY INFECTION: ICD-10-CM

## 2024-08-28 LAB
ALBUMIN SERPL BCG-MCNC: 3.9 G/DL (ref 3.5–5)
ALP SERPL-CCNC: 70 U/L (ref 34–104)
ALT SERPL W P-5'-P-CCNC: 12 U/L (ref 7–52)
ANION GAP SERPL CALCULATED.3IONS-SCNC: 7 MMOL/L (ref 4–13)
AST SERPL W P-5'-P-CCNC: 16 U/L (ref 13–39)
BACTERIA UR QL AUTO: ABNORMAL /HPF
BASOPHILS # BLD AUTO: 0.02 THOUSANDS/ÂΜL (ref 0–0.1)
BASOPHILS NFR BLD AUTO: 0 % (ref 0–1)
BILIRUB DIRECT SERPL-MCNC: 0.15 MG/DL (ref 0–0.2)
BILIRUB SERPL-MCNC: 1.56 MG/DL (ref 0.2–1)
BILIRUB UR QL STRIP: NEGATIVE
BUN SERPL-MCNC: 11 MG/DL (ref 5–25)
CALCIUM SERPL-MCNC: 8.9 MG/DL (ref 8.4–10.2)
CARDIAC TROPONIN I PNL SERPL HS: 3 NG/L
CHLORIDE SERPL-SCNC: 105 MMOL/L (ref 96–108)
CLARITY UR: CLEAR
CO2 SERPL-SCNC: 25 MMOL/L (ref 21–32)
COLOR UR: ABNORMAL
CREAT SERPL-MCNC: 0.57 MG/DL (ref 0.6–1.3)
EOSINOPHIL # BLD AUTO: 0 THOUSAND/ÂΜL (ref 0–0.61)
EOSINOPHIL NFR BLD AUTO: 0 % (ref 0–6)
ERYTHROCYTE [DISTWIDTH] IN BLOOD BY AUTOMATED COUNT: 12.9 % (ref 11.6–15.1)
FLUAV RNA RESP QL NAA+PROBE: NEGATIVE
FLUBV RNA RESP QL NAA+PROBE: NEGATIVE
GFR SERPL CREATININE-BSD FRML MDRD: 91 ML/MIN/1.73SQ M
GLUCOSE SERPL-MCNC: 106 MG/DL (ref 65–140)
GLUCOSE SERPL-MCNC: 112 MG/DL (ref 65–140)
GLUCOSE UR STRIP-MCNC: NEGATIVE MG/DL
HCT VFR BLD AUTO: 40.1 % (ref 34.8–46.1)
HGB BLD-MCNC: 13.4 G/DL (ref 11.5–15.4)
HGB UR QL STRIP.AUTO: NEGATIVE
IMM GRANULOCYTES # BLD AUTO: 0.01 THOUSAND/UL (ref 0–0.2)
IMM GRANULOCYTES NFR BLD AUTO: 0 % (ref 0–2)
KETONES UR STRIP-MCNC: ABNORMAL MG/DL
LEUKOCYTE ESTERASE UR QL STRIP: NEGATIVE
LIPASE SERPL-CCNC: 24 U/L (ref 11–82)
LYMPHOCYTES # BLD AUTO: 0.64 THOUSANDS/ÂΜL (ref 0.6–4.47)
LYMPHOCYTES NFR BLD AUTO: 14 % (ref 14–44)
MAGNESIUM SERPL-MCNC: 2 MG/DL (ref 1.9–2.7)
MCH RBC QN AUTO: 28.9 PG (ref 26.8–34.3)
MCHC RBC AUTO-ENTMCNC: 33.4 G/DL (ref 31.4–37.4)
MCV RBC AUTO: 86 FL (ref 82–98)
MONOCYTES # BLD AUTO: 0.48 THOUSAND/ÂΜL (ref 0.17–1.22)
MONOCYTES NFR BLD AUTO: 11 % (ref 4–12)
MUCOUS THREADS UR QL AUTO: ABNORMAL
NEUTROPHILS # BLD AUTO: 3.4 THOUSANDS/ÂΜL (ref 1.85–7.62)
NEUTS SEG NFR BLD AUTO: 75 % (ref 43–75)
NITRITE UR QL STRIP: NEGATIVE
NON-SQ EPI CELLS URNS QL MICRO: ABNORMAL /HPF
NRBC BLD AUTO-RTO: 0 /100 WBCS
PH UR STRIP.AUTO: 6 [PH]
PLATELET # BLD AUTO: 186 THOUSANDS/UL (ref 149–390)
PMV BLD AUTO: 8.8 FL (ref 8.9–12.7)
POTASSIUM SERPL-SCNC: 3.8 MMOL/L (ref 3.5–5.3)
PROT SERPL-MCNC: 6.4 G/DL (ref 6.4–8.4)
PROT UR STRIP-MCNC: ABNORMAL MG/DL
RBC # BLD AUTO: 4.64 MILLION/UL (ref 3.81–5.12)
RBC #/AREA URNS AUTO: ABNORMAL /HPF
RSV RNA RESP QL NAA+PROBE: NEGATIVE
SARS-COV-2 RNA RESP QL NAA+PROBE: POSITIVE
SODIUM SERPL-SCNC: 137 MMOL/L (ref 135–147)
SP GR UR STRIP.AUTO: >=1.05 (ref 1–1.03)
UROBILINOGEN UR STRIP-ACNC: <2 MG/DL
WBC # BLD AUTO: 4.55 THOUSAND/UL (ref 4.31–10.16)
WBC #/AREA URNS AUTO: ABNORMAL /HPF

## 2024-08-28 PROCEDURE — 96375 TX/PRO/DX INJ NEW DRUG ADDON: CPT

## 2024-08-28 PROCEDURE — 99285 EMERGENCY DEPT VISIT HI MDM: CPT | Performed by: EMERGENCY MEDICINE

## 2024-08-28 PROCEDURE — 84484 ASSAY OF TROPONIN QUANT: CPT | Performed by: EMERGENCY MEDICINE

## 2024-08-28 PROCEDURE — 87086 URINE CULTURE/COLONY COUNT: CPT | Performed by: EMERGENCY MEDICINE

## 2024-08-28 PROCEDURE — 99284 EMERGENCY DEPT VISIT MOD MDM: CPT

## 2024-08-28 PROCEDURE — 83735 ASSAY OF MAGNESIUM: CPT | Performed by: EMERGENCY MEDICINE

## 2024-08-28 PROCEDURE — 83690 ASSAY OF LIPASE: CPT | Performed by: EMERGENCY MEDICINE

## 2024-08-28 PROCEDURE — 85025 COMPLETE CBC W/AUTO DIFF WBC: CPT | Performed by: EMERGENCY MEDICINE

## 2024-08-28 PROCEDURE — 80048 BASIC METABOLIC PNL TOTAL CA: CPT | Performed by: EMERGENCY MEDICINE

## 2024-08-28 PROCEDURE — 96365 THER/PROPH/DIAG IV INF INIT: CPT

## 2024-08-28 PROCEDURE — 80076 HEPATIC FUNCTION PANEL: CPT | Performed by: EMERGENCY MEDICINE

## 2024-08-28 PROCEDURE — 93005 ELECTROCARDIOGRAM TRACING: CPT

## 2024-08-28 PROCEDURE — 36415 COLL VENOUS BLD VENIPUNCTURE: CPT | Performed by: EMERGENCY MEDICINE

## 2024-08-28 PROCEDURE — 71045 X-RAY EXAM CHEST 1 VIEW: CPT

## 2024-08-28 PROCEDURE — 81001 URINALYSIS AUTO W/SCOPE: CPT | Performed by: EMERGENCY MEDICINE

## 2024-08-28 PROCEDURE — 82948 REAGENT STRIP/BLOOD GLUCOSE: CPT

## 2024-08-28 PROCEDURE — 0241U HB NFCT DS VIR RESP RNA 4 TRGT: CPT | Performed by: EMERGENCY MEDICINE

## 2024-08-28 PROCEDURE — 74177 CT ABD & PELVIS W/CONTRAST: CPT

## 2024-08-28 RX ORDER — DICYCLOMINE HCL 20 MG
20 TABLET ORAL ONCE
Status: COMPLETED | OUTPATIENT
Start: 2024-08-28 | End: 2024-08-28

## 2024-08-28 RX ORDER — ONDANSETRON 4 MG/1
4 TABLET, ORALLY DISINTEGRATING ORAL EVERY 8 HOURS PRN
Qty: 12 TABLET | Refills: 0 | Status: SHIPPED | OUTPATIENT
Start: 2024-08-28

## 2024-08-28 RX ORDER — ACETAMINOPHEN 10 MG/ML
1000 INJECTION, SOLUTION INTRAVENOUS ONCE
Status: COMPLETED | OUTPATIENT
Start: 2024-08-28 | End: 2024-08-28

## 2024-08-28 RX ORDER — DICYCLOMINE HCL 20 MG
20 TABLET ORAL EVERY 8 HOURS PRN
Qty: 12 TABLET | Refills: 0 | Status: SHIPPED | OUTPATIENT
Start: 2024-08-28

## 2024-08-28 RX ORDER — ONDANSETRON 2 MG/ML
4 INJECTION INTRAMUSCULAR; INTRAVENOUS ONCE
Status: COMPLETED | OUTPATIENT
Start: 2024-08-28 | End: 2024-08-28

## 2024-08-28 RX ADMIN — ONDANSETRON 4 MG: 2 INJECTION INTRAMUSCULAR; INTRAVENOUS at 12:07

## 2024-08-28 RX ADMIN — SODIUM CHLORIDE 1000 ML: 0.9 INJECTION, SOLUTION INTRAVENOUS at 12:03

## 2024-08-28 RX ADMIN — IOHEXOL 100 ML: 350 INJECTION, SOLUTION INTRAVENOUS at 12:58

## 2024-08-28 RX ADMIN — DICYCLOMINE HYDROCHLORIDE 20 MG: 20 TABLET ORAL at 12:07

## 2024-08-28 RX ADMIN — ACETAMINOPHEN 1000 MG: 10 INJECTION INTRAVENOUS at 12:07

## 2024-08-28 NOTE — ED PROVIDER NOTES
History  Chief Complaint   Patient presents with    Vomiting - Severe    Abdominal Pain     Pt BIB EMS c/o of N/V,abdominal pain and diarrhea started last night, vomiting x6 diarrhea x6      Patient is a 73-year-old female with past medical history of bladder prolapse status postsurgical repair in 2020, osteoporosis, recent left hip replacement surgery in May 2024, presents to the emergency department for acute nausea, vomiting, diarrhea that started last night early after eating dinner.  She states that no one else in the household is sick with similar symptoms.  She has had at least 5 episodes of nonbloody and eventually bilious vomiting since last night as well as 6 episodes of nonbloody diarrhea.  She reports generalized abdominal pain described as cramping.  She reports feeling mildly lightheaded this morning.  She also states that since last night she started with a sore throat, nasal congestion which is all new.  She denies any fevers or chills, significant headache, vertigo, neck pain or stiffness, significant coughing, chest pain, palpitations, dyspnea, abdominal distention, dysuria, change in frequency, hematuria, flank pain, skin rash or color change, extremity weakness or paresthesia or other focal neurologic deficits.  Denies any recent travel, sick contacts.      History provided by:  Patient and EMS personnel   used: No        Prior to Admission Medications   Prescriptions Last Dose Informant Patient Reported? Taking?   ALPHA LIPOIC ACID PO  Self Yes No   Sig: Take by mouth   Bromelains (BROMELAIN PO)  Self Yes No   Sig: Take by mouth   Coenzyme Q10 (COQ10 PO)  Self Yes No   Sig: Take by mouth   Multiple Vitamins-Minerals (multivitamin with minerals) tablet  Self No No   Sig: Take 1 tablet by mouth daily   QUERCETIN PO  Self Yes No   Sig: Take by mouth   cholecalciferol (VITAMIN D3) 1,000 units tablet  Self Yes No   Sig: Take 1,000 Units by mouth daily   enoxaparin (LOVENOX) 40  mg/0.4 mL   No No   Sig: Inject 0.4 mL (40 mg total) under the skin daily for 28 days To start Post-Op   traMADol (Ultram) 50 mg tablet  Self No No   Sig: Take 1 tablet (50 mg total) by mouth every 6 (six) hours as needed for moderate pain   Patient not taking: Reported on 7/5/2024      Facility-Administered Medications: None       Past Medical History:   Diagnosis Date    Arthritis     stiff knees    Exercises daily     Female bladder prolapse     VEC today 11/16/2020     pelvic repair 3/2020    Hyperlipidemia     borderline    Hypoglycemia     Lyme disease     3 years ago    Migraine     not recent    Osteoporosis     borderline    Urinary incontinence     at times    Urinary urgency     Uterine prolapse     Wears dentures     bridge lower    Wears glasses        Past Surgical History:   Procedure Laterality Date    ME ARTHRP ACETBLR/PROX FEM PROSTC AGRFT/ALGRFT Left 4/29/2024    Procedure: ARTHROPLASTY HIP TOTAL;  Surgeon: Jethro Ray MD;  [facility];  Service: Orthopedics    ME CMBND ANTERPOST COLPORRAPHY W/CYSTO N/A 11/16/2020    Procedure: ANT COLPORRHAPHY;  Surgeon: Jerrell Zhu MD;  [facility];  Service: UroGynecology           ME COLPOPEXY VAGINAL EXTRAPERITONEAL APPROACH N/A 3/9/2020    Procedure: V.E.C.(ENPLACE);  Surgeon: Jerrell Zhu MD;  [facility];  Service: UroGynecology           ME COLPOPEXY VAGINAL EXTRAPERITONEAL APPROACH N/A 11/16/2020    Procedure: V.E.C. w/ enplace;  Surgeon: Jerrell Zhu MD;  [facility];  Service: UroGynecology           ME CYSTOURETHROSCOPY N/A 3/9/2020    Procedure: CYSTOSCOPY;  Surgeon: Jerrell Zhu MD;  [facility];  Service: UroGynecology           ME CYSTOURETHROSCOPY N/A 11/16/2020    Procedure: CYSTOSCOPY;  Surgeon: Jerrell Zhu MD;  [facility];  Service: UroGynecology           ME POST COLPORRHAPHY RECTOCELE W/WO PERINEORRHAPHY N/A 3/9/2020    Procedure: ANTERIOR AND POST  "COLPORRHAPHY;  Surgeon: Jerrell Zhu MD;  [facility];  Service: UroGynecology           NJ SLING OPERATION STRESS INCONTINENCE N/A 3/9/2020    Procedure: SINGLE INCISION SLING;  Surgeon: Jerrell Zhu MD;  [facility];  Service: UroGynecology           VAGINAL DELIVERY      \"46 yrs ago did have sedation\"    WISDOM TOOTH EXTRACTION         Family History   Problem Relation Age of Onset    Osteoporosis Mother     Stroke Father     Diabetes Father     Diabetes Sister      I have reviewed and agree with the history as documented.    E-Cigarette/Vaping    E-Cigarette Use Never User      E-Cigarette/Vaping Substances     Social History     Tobacco Use    Smoking status: Never    Smokeless tobacco: Never   Vaping Use    Vaping status: Never Used   Substance Use Topics    Alcohol use: Never    Drug use: No       Review of Systems   Constitutional:  Negative for chills and fever.   HENT:  Positive for congestion, rhinorrhea and sore throat. Negative for ear pain.    Respiratory:  Negative for cough, chest tightness, shortness of breath and wheezing.    Cardiovascular:  Negative for chest pain and palpitations.   Gastrointestinal:  Positive for abdominal pain, diarrhea, nausea and vomiting. Negative for abdominal distention, blood in stool and constipation.   Genitourinary:  Negative for dysuria, flank pain, frequency and hematuria.   Musculoskeletal:  Negative for back pain, neck pain and neck stiffness.   Skin:  Negative for color change, pallor, rash and wound.   Allergic/Immunologic: Negative for immunocompromised state.   Neurological:  Positive for light-headedness. Negative for dizziness, syncope, weakness, numbness and headaches.   Hematological:  Negative for adenopathy. Does not bruise/bleed easily.   Psychiatric/Behavioral:  Negative for confusion and decreased concentration.    All other systems reviewed and are negative.      Physical Exam  Physical Exam  Vitals and nursing note " reviewed.   Constitutional:       General: She is not in acute distress.     Appearance: Normal appearance. She is well-developed. She is not ill-appearing, toxic-appearing or diaphoretic.   HENT:      Head: Normocephalic and atraumatic.      Right Ear: External ear normal.      Left Ear: External ear normal.      Mouth/Throat:      Mouth: Mucous membranes are moist.      Pharynx: Oropharynx is clear. No oropharyngeal exudate.   Eyes:      Extraocular Movements: Extraocular movements intact.      Conjunctiva/sclera: Conjunctivae normal.   Neck:      Vascular: No JVD.   Cardiovascular:      Rate and Rhythm: Normal rate and regular rhythm.      Pulses: Normal pulses.      Heart sounds: Normal heart sounds. No murmur heard.     No friction rub. No gallop.   Pulmonary:      Effort: Pulmonary effort is normal. No respiratory distress.      Breath sounds: Normal breath sounds. No wheezing, rhonchi or rales.   Abdominal:      General: There is no distension.      Palpations: Abdomen is soft.      Tenderness: There is abdominal tenderness. There is no right CVA tenderness, left CVA tenderness, guarding or rebound.      Comments: +Mild tenderness in LLQ.    Musculoskeletal:         General: No swelling or tenderness. Normal range of motion.      Cervical back: Normal range of motion and neck supple. No rigidity.   Skin:     General: Skin is warm and dry.      Coloration: Skin is not pale.      Findings: No erythema or rash.   Neurological:      General: No focal deficit present.      Mental Status: She is alert and oriented to person, place, and time.      Sensory: No sensory deficit.      Motor: No weakness.   Psychiatric:         Mood and Affect: Mood normal.         Behavior: Behavior normal.         Vital Signs  ED Triage Vitals [08/28/24 1147]   Temperature Pulse Respirations Blood Pressure SpO2   98 °F (36.7 °C) 76 18 119/57 95 %      Temp src Heart Rate Source Patient Position - Orthostatic VS BP Location FiO2 (%)    -- Monitor -- Right arm --      Pain Score       --         Vitals:    08/28/24 1147 08/28/24 1148   BP: 119/57    BP Location: Right arm    Pulse: 76    Resp: 18    Temp: 98 °F (36.7 °C)    SpO2: 95% 95%          Visual Acuity      ED Medications  Medications   sodium chloride 0.9 % bolus 1,000 mL (0 mL Intravenous Stopped 8/28/24 1335)   ondansetron (ZOFRAN) injection 4 mg (4 mg Intravenous Given 8/28/24 1207)   acetaminophen (Ofirmev) injection 1,000 mg (0 mg Intravenous Stopped 8/28/24 1335)   dicyclomine (BENTYL) tablet 20 mg (20 mg Oral Given 8/28/24 1207)   iohexol (OMNIPAQUE) 350 MG/ML injection (MULTI-DOSE) 100 mL (100 mL Intravenous Given 8/28/24 1258)       Diagnostic Studies  Results Reviewed       Procedure Component Value Units Date/Time    Urine Microscopic [[id number]]  (Abnormal) Collected: 08/28/24 1449    Lab Status: Final result Specimen: Urine, Clean Catch Updated: 08/28/24 1554     RBC, UA 2-4 /hpf      WBC, UA 1-2 /hpf      Epithelial Cells None Seen /hpf      Bacteria, UA None Seen /hpf      MUCUS THREADS Occasional    UA (URINE) with reflex to Scope [[id number]]  (Abnormal) Collected: 08/28/24 1449    Lab Status: Final result Specimen: Urine, Clean Catch Updated: 08/28/24 1529     Color, UA Light Yellow     Clarity, UA Clear     Specific Gravity, UA >=1.050     pH, UA 6.0     Leukocytes, UA Negative     Nitrite, UA Negative     Protein, UA Trace mg/dl      Glucose, UA Negative mg/dl      Ketones, UA 40 (2+) mg/dl      Urobilinogen, UA <2.0 mg/dl      Bilirubin, UA Negative     Occult Blood, UA Negative    Urine culture [[id number]] Collected: 08/28/24 1452    Lab Status: In process Specimen: Urine, Clean Catch Updated: 08/28/24 1519    FLU/RSV/COVID - if FLU/RSV clinically relevant [[id number]]  (Abnormal) Collected: 08/28/24 1156    Lab Status: Final result Specimen: Nares from Nose Updated: 08/28/24 1241     SARS-CoV-2 Positive     INFLUENZA A PCR Negative     INFLUENZA B PCR Negative      RSV PCR Negative    Narrative:      This test has been performed using the CoV-2/Flu/RSV plus assay on the [employer] GeneXpert platform. This test has been validated by the  and verified by the performing laboratory.     This test is designed to amplify and detect the following: nucleocapsid (N), envelope (E), and RNA-dependent RNA polymerase (RdRP) genes of the SARS-CoV-2 genome; matrix (M), basic polymerase (PB2), and acidic protein (PA) segments of the influenza A genome; matrix (M) and non-structural protein (NS) segments of the influenza B genome, and the nucleocapsid genes of RSV A and RSV B.     Positive results are indicative of the presence of Flu A, Flu B, RSV, and/or SARS-CoV-2 RNA. Positive results for SARS-CoV-2 or suspected novel influenza should be reported to state, local, or federal health departments according to local reporting requirements.      All results should be assessed in conjunction with clinical presentation and other laboratory markers for clinical management.     FOR PEDIATRIC PATIENTS - copy/paste COVID Guidelines URL to browser: [email or web address]       Basic metabolic panel [[id number]]  (Abnormal) Collected: 08/28/24 1156    Lab Status: Final result Specimen: Blood from Arm, Left Updated: 08/28/24 1239     Sodium 137 mmol/L      Potassium 3.8 mmol/L      Chloride 105 mmol/L      CO2 25 mmol/L      ANION GAP 7 mmol/L      BUN 11 mg/dL      Creatinine 0.57 mg/dL      Glucose 112 mg/dL      Calcium 8.9 mg/dL      eGFR 91 ml/min/1.73sq m     Narrative:      National Kidney Disease Foundation guidelines for Chronic Kidney Disease (CKD):     Stage 1 with normal or high GFR (GFR > 90 mL/min/1.73 square meters)    Stage 2 Mild CKD (GFR = 60-89 mL/min/1.73 square meters)    Stage 3A Moderate CKD (GFR = 45-59 mL/min/1.73 square meters)    Stage 3B Moderate CKD (GFR = 30-44 mL/min/1.73 square meters)    Stage 4 Severe CKD (GFR =  15-29 mL/min/1.73 square meters)    Stage 5 End Stage CKD (GFR <15 mL/min/1.73 square meters)  Note: GFR calculation is accurate only with a steady state creatinine    Hepatic function panel [[id number]]  (Abnormal) Collected: 08/28/24 1156    Lab Status: Final result Specimen: Blood from Arm, Left Updated: 08/28/24 1239     Total Bilirubin 1.56 mg/dL      Bilirubin, Direct 0.15 mg/dL      Alkaline Phosphatase 70 U/L      AST 16 U/L      ALT 12 U/L      Total Protein 6.4 g/dL      Albumin 3.9 g/dL     Magnesium [[id number]]  (Normal) Collected: 08/28/24 1156    Lab Status: Final result Specimen: Blood from Arm, Left Updated: 08/28/24 1239     Magnesium 2.0 mg/dL     Lipase [[id number]]  (Normal) Collected: 08/28/24 1156    Lab Status: Final result Specimen: Blood from Arm, Left Updated: 08/28/24 1239     Lipase 24 u/L     HS Troponin 0hr (reflex protocol) [[id number]]  (Normal) Collected: 08/28/24 1159    Lab Status: Final result Specimen: Blood from Arm, Left Updated: 08/28/24 1230     hs TnI 0hr 3 ng/L     Fingerstick Glucose (POCT) [[id number]]  (Normal) Collected: 08/28/24 1215    Lab Status: Final result Specimen: Blood Updated: 08/28/24 1218     POC Glucose 106 mg/dl     CBC and differential [[id number]]  (Abnormal) Collected: 08/28/24 1156    Lab Status: Final result Specimen: Blood from Arm, Left Updated: 08/28/24 1205     WBC 4.55 Thousand/uL      RBC 4.64 Million/uL      Hemoglobin 13.4 g/dL      Hematocrit 40.1 %      MCV 86 fL      MCH 28.9 pg      MCHC 33.4 g/dL      RDW 12.9 %      MPV 8.8 fL      Platelets 186 Thousands/uL      nRBC 0 /100 WBCs      Segmented % 75 %      Immature Grans % 0 %      Lymphocytes % 14 %      Monocytes % 11 %      Eosinophils Relative 0 %      Basophils Relative 0 %      Absolute Neutrophils 3.40 Thousands/µL      Absolute Immature Grans 0.01 Thousand/uL      Absolute Lymphocytes 0.64 Thousands/µL      Absolute Monocytes 0.48 Thousand/µL      Eosinophils Absolute 0.00 Thousand/µL       Basophils Absolute 0.02 Thousands/µL                    XR chest 1 view portable   ED Interpretation by Irena Tejada DO (08/28 1407)   No acute abnormality in the chest.      Final Result by Surya Matos MD (08/28 1529)      No acute cardiopulmonary disease.            Workstation performed: [id number]         CT abdomen pelvis with contrast   Final Result by Shilpi Taylor MD (08/28 1416)   No acute inflammatory stranding   No bowel obstruction   Small focus of air within the urinary bladder, nonspecific. Correlate with urinary evaluation to exclude UTI                  Workstation performed: [id number]                    Procedures  ECG 12 Lead Documentation Only    Date/Time: 8/28/2024 11:58 AM    Performed by: Irena Tejada DO  Authorized by: Irena Tejada DO    ECG reviewed by me, the ED Provider: yes    Patient location:  ED  Previous ECG:     Previous ECG:  Compared to current    Comparison ECG info:  4-; left axis deviation is new.  Left anterior fascicular block is new. LVH is new.  Rate:     ECG rate:  79    ECG rate assessment: normal    Rhythm:     Rhythm: sinus rhythm    Ectopy:     Ectopy: none    QRS:     QRS axis:  Left    QRS intervals:  Normal  Conduction:     Conduction: abnormal      Abnormal conduction: LAFB    ST segments:     ST segments:  Normal  T waves:     T waves: normal    Other findings:     Other findings: LVH             ED Course  ED Course as of 08/28/24 1808   Wed Aug 28, 2024   1256 SARS-COV-2(!): Positive   1256 hs TnI 0hr: 3   1401 Updated patient about positive COVID test and rest of workup being unremarkable thus far.  Discussed that we are still waiting on CT results.  Patient overall feeling much better.  She states she has had COVID before that resulted in similar symptoms and denies ever requiring hospitalization related to COVID.   1547 Leukocytes, UA: Negative   1547 Nitrite, UA: Negative  She denies UTI symptoms.   Patient states she does use a pessary for bladder prolapse but no recent instrumentation.  I advised patient to follow-up with PCP.  Discussed symptomatic management and when to return to the ER.   1614 Bacteria, UA: None Seen   1615 WBC, UA: 1-2                                 SBIRT 20yo+      Flowsheet Row Most Recent Value   Initial Alcohol Screen: US AUDIT-C     1. How often do you have a drink containing alcohol? 0 Filed at: 08/28/2024 1149   2. How many drinks containing alcohol do you have on a typical day you are drinking?  0 Filed at: 08/28/2024 1149   3b. FEMALE Any Age, or MALE 65+: How often do you have 4 or more drinks on one occassion? 0 Filed at: 08/28/2024 1149   Audit-C Score 0 Filed at: 08/28/2024 1140   GIULIA: How many times in the past year have you...    Used an illegal drug or used a prescription medication for non-medical reasons? Never Filed at: 08/28/2024 1146                      Medical Decision Making  73-year-old female presents to the ED for acute nausea, vomiting, diarrhea, runny nose, congestion and sore throat that started last night.  Suspect viral syndrome, possible flu or COVID however any viral etiology considered.  Given the left lower quadrant abdominal tenderness, other considerations include focal colitis or enteritis, diverticulitis.  Will evaluate with abdominal labs, COVID/flu/RSV swab, CT scan of the abdomen and pelvis.  Will provide IV fluid bolus, Zofran, Tylenol, Bentyl for symptom relief.    Amount and/or Complexity of Data Reviewed  Labs: ordered. Decision-making details documented in ED Course.  Radiology: ordered and independent interpretation performed. Decision-making details documented in ED Course.    Risk  Prescription drug management.                 Disposition  Final diagnoses:   Flu-like symptoms   COVID-19 virus infection   Nausea vomiting and diarrhea   Upper respiratory infection     Time reflects when diagnosis was documented in both MDM as applicable  and the Disposition within this note       Time User Action Codes Description Comment    8/28/2024  3:48 PM Irena Tejada Add [R68.89] Flu-like symptoms     8/28/2024  3:48 PM Irena Tejada Add [U07.1] COVID-19 virus infection     8/28/2024  3:48 PM AuIrena vasquez Add [R11.2,  R19.7] Nausea vomiting and diarrhea     8/28/2024  3:48 PM Irena Tejada Add [J06.9] Upper respiratory infection           ED Disposition       ED Disposition   Discharge    Condition   Stable    Date/Time   Wed Aug 28, 2024 1548    Comment   Anisa Rowe discharge to home/self care.                   Follow-up Information       Follow up With Specialties Details Why Contact Info Additional Information    Samir Pradhan,  General Practice Schedule an appointment as soon as possible for a visit   [address]  [address]  [facility] [address]  [phone]       [facility] Emergency Department Emergency Medicine Go to  If symptoms worsen [address] [facility] [id number] [phone] [facility] Emergency Department, [address], [place], [address]            Discharge Medication List as of 8/28/2024  3:49 PM        START taking these medications    Details   dicyclomine (BENTYL) 20 mg tablet Take 1 tablet (20 mg total) by mouth every 8 (eight) hours as needed (abdominal pain or diarrhea), Starting Wed 8/28/2024, Normal      ondansetron (ZOFRAN-ODT) 4 mg disintegrating tablet Take 1 tablet (4 mg total) by mouth every 8 (eight) hours as needed for vomiting or nausea, Starting Wed 8/28/2024, Normal           CONTINUE these medications which have NOT CHANGED    Details   ALPHA LIPOIC ACID PO Take by mouth, Historical Med      Bromelains (BROMELAIN PO) Take by mouth, Historical Med      cholecalciferol (VITAMIN D3) 1,000 units tablet Take 1,000 Units by mouth daily, Historical Med      Coenzyme Q10 (COQ10 PO) Take by mouth, Historical Med       enoxaparin (LOVENOX) 40 mg/0.4 mL Inject 0.4 mL (40 mg total) under the skin daily for 28 days To start Post-Op, Starting Fri 3/22/2024, Until Fri 4/19/2024, Normal      Multiple Vitamins-Minerals (multivitamin with minerals) tablet Take 1 tablet by mouth daily, Starting Fri 3/22/2024, Normal      QUERCETIN PO Take by mouth, Historical Med      traMADol (Ultram) 50 mg tablet Take 1 tablet (50 mg total) by mouth every 6 (six) hours as needed for moderate pain, Starting Mon 5/6/2024, Normal             No discharge procedures on file.    PDMP Review         Value Time User    PDMP Reviewed  Yes 5/6/2024  3:07 PM Piedad Mena PA-C            ED Provider  Electronically Signed by             Irena Tejada DO  08/28/24 [id number]

## 2024-08-29 LAB
ATRIAL RATE: 73 BPM
ATRIAL RATE: 79 BPM
P AXIS: 61 DEGREES
P AXIS: 66 DEGREES
PR INTERVAL: 182 MS
PR INTERVAL: 192 MS
QRS AXIS: -53 DEGREES
QRS AXIS: -54 DEGREES
QRSD INTERVAL: 112 MS
QRSD INTERVAL: 114 MS
QT INTERVAL: 408 MS
QT INTERVAL: 442 MS
QTC INTERVAL: 467 MS
QTC INTERVAL: 486 MS
T WAVE AXIS: 74 DEGREES
T WAVE AXIS: 88 DEGREES
VENTRICULAR RATE: 73 BPM
VENTRICULAR RATE: 79 BPM

## 2024-08-29 PROCEDURE — 93010 ELECTROCARDIOGRAM REPORT: CPT | Performed by: INTERNAL MEDICINE

## 2024-08-30 LAB — BACTERIA UR CULT: NORMAL

## 2024-09-17 ENCOUNTER — TELEPHONE (OUTPATIENT)
Age: 74
End: 2024-09-17

## 2024-09-17 NOTE — TELEPHONE ENCOUNTER
New Patient    What is the reason for the patient’s appointment?:Patient called stating she would like to schedule appointment with Urology.  She stated she was told by pcp she had kidney stones. She would like to be evaluated. Patient scheduled appt for 10/24/24 with Malena at [place].    What office location does the patient prefer?:[place]     Does patient have Imaging/Lab Results:    Have patient records been requested?:  If No, are the records showing in Epic:       INSURANCE:   Do we accept the patient's insurance or is the patient Self-Pay?:    Insurance Provider:[employer]   Plan Type/Number:   Member ID#:       HISTORY:   Has the patient had any previous Urologist(s)?:no     Was the patient seen in the ED?:    Has the patient had any outside testing done?:    Does the patient have a personal history of cancer?:no

## 2024-10-15 DIAGNOSIS — Z47.1 AFTERCARE FOLLOWING LEFT HIP JOINT REPLACEMENT SURGERY: Primary | ICD-10-CM

## 2024-10-15 DIAGNOSIS — Z96.642 AFTERCARE FOLLOWING LEFT HIP JOINT REPLACEMENT SURGERY: Primary | ICD-10-CM

## 2024-10-23 NOTE — PROGRESS NOTES
10/24/2024      Chief Complaint   Patient presents with    Consult    Nephrolithiasis         Assessment and Plan    74 y.o. female -- New patient    1. Microscopic hematuria  - UA today negative for infection or blood  - UA micro (8/28/24) positive for 2-4 RBC/hpf. Negative for infection at that time  - CT abdomen pelvis w contrast (8/28/24) negative from  standpoint other than small focus of air in bladder   - US abdomen complete (9/6/24) at an outside facility showing 6 mm lower pole stone on right  - Recommend CT renal protocol and return for cystoscopy   - Call with any questions or concerns in the meantime  - All questions answered; patient understands and agrees with plan       History of Present Illness  Anisa Rowe is a 74 y.o. female new patient here for evaluation of    Previously saw urogyn. Patient had recent ultrasound abdomen showing 6 mm lower pole stone on right.  Patient has CT prior negative for stones.  Urine micro positive for 2-4 red blood cells per high-powered field.  Denies gross hematuria.  Bladder prolapse s/p sling in 2020. Now using pessary. Denies family history of  malignancies.     Review of Systems   Constitutional:  Negative for activity change, appetite change, chills and fever.   HENT:  Negative for congestion and trouble swallowing.    Respiratory:  Negative for cough and shortness of breath.    Cardiovascular:  Negative for chest pain, palpitations and leg swelling.   Gastrointestinal:  Negative for abdominal pain, constipation, diarrhea, nausea and vomiting.   Genitourinary:  Negative for difficulty urinating, dysuria, flank pain, frequency, hematuria and urgency.   Musculoskeletal:  Negative for back pain and gait problem.   Skin:  Negative for wound.   Allergic/Immunologic: Negative for immunocompromised state.   Neurological:  Negative for dizziness and syncope.   Hematological:  Does not bruise/bleed easily.   Psychiatric/Behavioral:  Negative for confusion.    All  "other systems reviewed and are negative.      Vitals  Vitals:    10/24/24 1319   BP: 140/84   BP Location: Left arm   Patient Position: Sitting   Cuff Size: Standard   Pulse: 77   Temp: 97.6 °F (36.4 °C)   TempSrc: Temporal   SpO2: 98%   Weight: 75.3 kg (166 lb)   Height: 5' 8\" (1.727 m)       Physical Exam  Constitutional:       General: She is not in acute distress.     Appearance: Normal appearance. She is not ill-appearing, toxic-appearing or diaphoretic.   HENT:      Head: Normocephalic.      Nose: No congestion.   Eyes:      General: No scleral icterus.        Right eye: No discharge.         Left eye: No discharge.      Conjunctiva/sclera: Conjunctivae normal.      Pupils: Pupils are equal, round, and reactive to light.   Pulmonary:      Effort: Pulmonary effort is normal.   Musculoskeletal:      Cervical back: Normal range of motion.   Skin:     General: Skin is warm and dry.      Coloration: Skin is not jaundiced or pale.      Findings: No bruising, erythema, lesion or rash.   Neurological:      General: No focal deficit present.      Mental Status: She is alert and oriented to person, place, and time. Mental status is at baseline.      Gait: Gait normal.   Psychiatric:         Mood and Affect: Mood normal.         Behavior: Behavior normal.         Thought Content: Thought content normal.         Judgment: Judgment normal.           Past History  Past Medical History:   Diagnosis Date    Arthritis     stiff knees    Exercises daily     Female bladder prolapse     VEC today 11/16/2020     pelvic repair 3/2020    Hyperlipidemia     borderline    Hypoglycemia     Kidney stone     Lyme disease     3 years ago    Migraine     not recent    Osteoporosis     borderline    Urinary incontinence     at times    Urinary urgency     Uterine prolapse     Wears dentures     bridge lower    Wears glasses      Social History     Socioeconomic History    Marital status: Registered Domestic Partner     Spouse name: None    " Number of children: None    Years of education: None    Highest education level: None   Occupational History    Occupation:      Comment: retired   Tobacco Use    Smoking status: Never    Smokeless tobacco: Never   Vaping Use    Vaping status: Never Used   Substance and Sexual Activity    Alcohol use: Never    Drug use: No    Sexual activity: Not Currently   Other Topics Concern    None   Social History Narrative    None     Social Determinants of Health     Financial Resource Strain: Low Risk  (2/14/2024)    Received from [facility]    Overall Financial Resource Strain (CARDIA)     Difficulty of Paying Living Expenses: Not very hard   Food Insecurity: No Food Insecurity (5/1/2024)    Nursing - Inadequate Food Risk Classification     Worried About Running Out of Food in the Last Year: Never true     Ran Out of Food in the Last Year: Never true     Ran Out of Food in the Last Year: Not on file   Transportation Needs: No Transportation Needs (5/1/2024)    PRAPARE - Transportation     Lack of Transportation (Medical): No     Lack of Transportation (Non-Medical): No   Physical Activity: Sufficiently Active (2/14/2024)    Received from [facility]    Exercise Vital Sign     Days of Exercise per Week: 4 days     Minutes of Exercise per Session: 60 min   Stress: No Stress Concern Present (2/14/2024)    Received from [facility]    [employer] - Occupational Stress Questionnaire     Feeling of Stress : Not at all   Social Connections: Unknown (6/18/2024)    Received from [facility]    Social Connections     How often do you feel lonely or isolated from those around you? (Adult - for ages 18 years and over): Not on file   Intimate Partner Violence: Not At Risk (2/14/2024)    Received from [facility]    Humiliation, Afraid, Rape, and Kick questionnaire     Fear of Current or Ex-Partner: No     Emotionally Abused: No     Physically Abused: No      "Sexually Abused: No   Housing Stability: Low Risk  (5/1/2024)    Housing Stability Vital Sign     Unable to Pay for Housing in the Last Year: No     Number of Times Moved in the Last Year: 1     Homeless in the Last Year: No     Social History     Tobacco Use   Smoking Status Never   Smokeless Tobacco Never     Family History   Problem Relation Age of Onset    Osteoporosis Mother     Stroke Father     Diabetes Father     Diabetes Sister        The following portions of the patient's history were reviewed and updated as appropriate: allergies, current medications, past medical history, past social history, past surgical history and problem list.    Results  Recent Results (from the past 1 hour(s))   POCT urine dip    Collection Time: 10/24/24  1:21 PM   Result Value Ref Range    LEUKOCYTE ESTERASE,UA +     NITRITE,UA -     SL AMB POCT UROBILINOGEN 0.2     POCT URINE PROTEIN 0.15      PH,UA 5.0     BLOOD,UA -     SPECIFIC GRAVITY,UA 1.010     KETONES,UA -     BILIRUBIN,UA -     GLUCOSE, UA -      COLOR,UA yellow     CLARITY,UA cler    ]  No results found for: \"PSA\"  Lab Results   Component Value Date    CALCIUM 9.4 09/06/2024    K 4.0 09/06/2024    CO2 27 09/06/2024     09/06/2024    BUN 11 09/06/2024    CREATININE 0.65 09/06/2024     Lab Results   Component Value Date    WBC 4.55 08/28/2024    HGB 13.4 08/28/2024    HCT 40.1 08/28/2024    MCV 86 08/28/2024     08/28/2024       Malena Bee PA-C  "

## 2024-10-24 ENCOUNTER — OFFICE VISIT (OUTPATIENT)
Dept: UROLOGY | Facility: CLINIC | Age: 74
End: 2024-10-24
Payer: COMMERCIAL

## 2024-10-24 VITALS
DIASTOLIC BLOOD PRESSURE: 84 MMHG | TEMPERATURE: 97.6 F | HEART RATE: 77 BPM | OXYGEN SATURATION: 98 % | SYSTOLIC BLOOD PRESSURE: 140 MMHG | HEIGHT: 68 IN | BODY MASS INDEX: 25.16 KG/M2 | WEIGHT: 166 LBS

## 2024-10-24 DIAGNOSIS — R31.29 MICROHEMATURIA: Primary | ICD-10-CM

## 2024-10-24 LAB
SL AMB  POCT GLUCOSE, UA: NORMAL
SL AMB LEUKOCYTE ESTERASE,UA: NORMAL
SL AMB POCT BILIRUBIN,UA: NORMAL
SL AMB POCT BLOOD,UA: NORMAL
SL AMB POCT CLARITY,UA: NORMAL
SL AMB POCT COLOR,UA: YELLOW
SL AMB POCT KETONES,UA: NORMAL
SL AMB POCT NITRITE,UA: NORMAL
SL AMB POCT PH,UA: 5
SL AMB POCT SPECIFIC GRAVITY,UA: 1.01
SL AMB POCT URINE PROTEIN: 0.15
SL AMB POCT UROBILINOGEN: 0.2

## 2024-10-24 PROCEDURE — 81002 URINALYSIS NONAUTO W/O SCOPE: CPT | Performed by: PHYSICIAN ASSISTANT

## 2024-10-24 PROCEDURE — 99204 OFFICE O/P NEW MOD 45 MIN: CPT | Performed by: PHYSICIAN ASSISTANT

## 2024-10-24 RX ORDER — CEPHALEXIN 500 MG/1
CAPSULE ORAL
COMMUNITY
Start: 2024-09-06

## 2024-10-29 ENCOUNTER — HOSPITAL ENCOUNTER (OUTPATIENT)
Dept: RADIOLOGY | Facility: HOSPITAL | Age: 74
Discharge: HOME | End: 2024-10-29
Attending: ORTHOPAEDIC SURGERY
Payer: COMMERCIAL

## 2024-10-29 ENCOUNTER — OFFICE VISIT (OUTPATIENT)
Dept: OBGYN CLINIC | Facility: HOSPITAL | Age: 74
End: 2024-10-29
Payer: COMMERCIAL

## 2024-10-29 VITALS
HEART RATE: 83 BPM | BODY MASS INDEX: 25.24 KG/M2 | SYSTOLIC BLOOD PRESSURE: 145 MMHG | DIASTOLIC BLOOD PRESSURE: 73 MMHG | HEIGHT: 68 IN

## 2024-10-29 DIAGNOSIS — Z01.812 PRE-OPERATIVE LABORATORY EXAMINATION: ICD-10-CM

## 2024-10-29 DIAGNOSIS — Z96.642 AFTERCARE FOLLOWING LEFT HIP JOINT REPLACEMENT SURGERY: ICD-10-CM

## 2024-10-29 DIAGNOSIS — Z96.651 AFTERCARE FOLLOWING RIGHT KNEE JOINT REPLACEMENT SURGERY: ICD-10-CM

## 2024-10-29 DIAGNOSIS — M17.11 PRIMARY OSTEOARTHRITIS OF RIGHT KNEE: ICD-10-CM

## 2024-10-29 DIAGNOSIS — Z79.01 ANTICOAGULATION MANAGEMENT ENCOUNTER: ICD-10-CM

## 2024-10-29 DIAGNOSIS — Z47.1 AFTERCARE FOLLOWING RIGHT KNEE JOINT REPLACEMENT SURGERY: ICD-10-CM

## 2024-10-29 DIAGNOSIS — G89.29 CHRONIC PAIN OF RIGHT KNEE: ICD-10-CM

## 2024-10-29 DIAGNOSIS — Z47.1 AFTERCARE FOLLOWING LEFT HIP JOINT REPLACEMENT SURGERY: Primary | ICD-10-CM

## 2024-10-29 DIAGNOSIS — Z01.810 PRE-OPERATIVE CARDIOVASCULAR EXAMINATION: ICD-10-CM

## 2024-10-29 DIAGNOSIS — Z47.1 AFTERCARE FOLLOWING LEFT HIP JOINT REPLACEMENT SURGERY: ICD-10-CM

## 2024-10-29 DIAGNOSIS — Z96.642 AFTERCARE FOLLOWING LEFT HIP JOINT REPLACEMENT SURGERY: Primary | ICD-10-CM

## 2024-10-29 DIAGNOSIS — Z51.81 ANTICOAGULATION MANAGEMENT ENCOUNTER: ICD-10-CM

## 2024-10-29 DIAGNOSIS — M25.561 CHRONIC PAIN OF RIGHT KNEE: ICD-10-CM

## 2024-10-29 PROCEDURE — 73502 X-RAY EXAM HIP UNI 2-3 VIEWS: CPT

## 2024-10-29 PROCEDURE — 99214 OFFICE O/P EST MOD 30 MIN: CPT | Performed by: ORTHOPAEDIC SURGERY

## 2024-10-29 RX ORDER — ENOXAPARIN SODIUM 100 MG/ML
40 INJECTION SUBCUTANEOUS DAILY
Qty: 11.2 ML | Refills: 0 | Status: SHIPPED | OUTPATIENT
Start: 2024-10-29 | End: 2024-11-26

## 2024-10-29 RX ORDER — FOLIC ACID 1 MG/1
1 TABLET ORAL DAILY
Qty: 30 TABLET | Refills: 0 | Status: SHIPPED | OUTPATIENT
Start: 2024-10-29

## 2024-10-29 RX ORDER — CEFAZOLIN SODIUM 2 G/50ML
2000 SOLUTION INTRAVENOUS ONCE
OUTPATIENT
Start: 2024-10-29 | End: 2024-10-29

## 2024-10-29 RX ORDER — ASCORBIC ACID 500 MG
500 TABLET ORAL 2 TIMES DAILY
Qty: 60 TABLET | Refills: 0 | Status: SHIPPED | OUTPATIENT
Start: 2024-10-29

## 2024-10-29 RX ORDER — TRANEXAMIC ACID 10 MG/ML
1000 INJECTION, SOLUTION INTRAVENOUS ONCE
OUTPATIENT
Start: 2024-10-29 | End: 2024-10-29

## 2024-10-29 RX ORDER — FERROUS SULFATE 324(65)MG
TABLET, DELAYED RELEASE (ENTERIC COATED) ORAL
Qty: 30 TABLET | Refills: 0 | Status: SHIPPED | OUTPATIENT
Start: 2024-10-29

## 2024-10-29 RX ORDER — MUPIROCIN 20 MG/G
OINTMENT TOPICAL 2 TIMES DAILY
Qty: 15 G | Refills: 0 | Status: SHIPPED | OUTPATIENT
Start: 2024-10-29

## 2024-10-29 RX ORDER — CHLORHEXIDINE GLUCONATE ORAL RINSE 1.2 MG/ML
15 SOLUTION DENTAL ONCE
OUTPATIENT
Start: 2024-10-29 | End: 2024-10-29

## 2024-10-29 NOTE — PROGRESS NOTES
Assessment:   Diagnosis ICD-10-CM Associated Orders   1. Aftercare following left hip joint replacement surgery  Z47.1     Z96.642       2. Primary osteoarthritis of right knee  M17.11 Diet NPO; Sips with meds     Nursing Communication Warmimg Interventions Implemented     Nursing Communication CHG bath, have staff wash entire body (neck down) per pre-op bathing protocol. Routine, evening prior to, and day of surgery.     Nursing Communication Swab both nares with Povidone-Iodine solution, EXCLUDE if patient has shellfish/Iodine allergy, and replace with nasal alcohol swabstick. Routine, day of surgery, on call to OR     Void on call to OR     Insert peripheral IV     Ambulatory referral to Physical Therapy     Place sequential compression device     Case request operating room: robotically assisted right total knee arthroplasty, all associated procedures as indicated     Case request operating room: robotically assisted right total knee arthroplasty, all associated procedures as indicated      3. Chronic pain of right knee  M25.561 Diet NPO; Sips with meds    G89.29 Nursing Communication Warmimg Interventions Implemented     Nursing Communication CHG bath, have staff wash entire body (neck down) per pre-op bathing protocol. Routine, evening prior to, and day of surgery.     Nursing Communication Swab both nares with Povidone-Iodine solution, EXCLUDE if patient has shellfish/Iodine allergy, and replace with nasal alcohol swabstick. Routine, day of surgery, on call to OR     Void on call to OR     Insert peripheral IV     Ambulatory referral to Physical Therapy     Place sequential compression device     Case request operating room: robotically assisted right total knee arthroplasty, all associated procedures as indicated     Case request operating room: robotically assisted right total knee arthroplasty, all associated procedures as indicated      4. Pre-operative laboratory examination  Z01.812 Comprehensive  metabolic panel     Hemoglobin A1C W/EAG Estimation     CBC and differential     Anemia Panel w/Reflex     Protime-INR     APTT     Type and screen      5. Pre-operative cardiovascular examination  Z01.810       6. Aftercare following right knee joint replacement surgery  Z47.1 Ambulatory referral to Physical Therapy    Z96.651       7. Anticoagulation management encounter  Z51.81 CBC and differential    Z79.01 Protime-INR     APTT          Plan:  Diagnostics reviewed and physical exam performed.  Diagnosis, treatment options and associated risks were discussed with the patient including no treatment, nonsurgical treatment and potential for surgical intervention.  The patient was given the opportunity to ask questions regarding each.  Doing well 6 months status post left total hip arthroplasty. Educated that her left thigh numbness should continue to improve if it's stemming from her hip replacement surgery however it could also be from her lumbar spine.   Quality of life decision to pursue elective right total knee arthroplasty.  Risks and benefits of a robotically assisted right total knee arthroplasty were discussed.  Consents obtained.  Preoperative vitamins and postoperative Lovenox sent to her pharmacy record.    To do next visit:  Return for post-op with x-rays upon arrival right knee, 6 months for left YONATAN XR.    The above stated was discussed in layman's terms and the patient expressed understanding.  All questions were answered to the patient's satisfaction.       Scribe Attestation      I,:  Rick Soria am acting as a scribe while in the presence of the attending physician.:       I,:  Jethro Ray MD personally performed the services described in this documentation    as scribed in my presence.:               Subjective:   Anisa Rowe is a 74 y.o. female who presents today for repeat evaluation 6 months status post left total hip arthroplasty.  Overall she is doing well.  She still has  improving/residual numbness laterally at her left hip but does not radiate distal to her knee.  She presents using a walking stick for ambulatory assistance as she fears falling due to her right knee buckling underneath her.  She has advanced right knee osteoarthritis.  She had injections of cortisone previously with minimal lasting relief.  She finds that her right knee symptoms do limit her day-to-day activities as well as impedes on her quality of life.  Pain scale 7/10 or greater at times.  She wishes to schedule an elective right total knee arthroplasty.      Review of systems negative unless otherwise specified in HPI  Review of Systems    Past Medical History:   Diagnosis Date    Arthritis     stiff knees    Exercises daily     Female bladder prolapse     VEC today 11/16/2020     pelvic repair 3/2020    Hyperlipidemia     borderline    Hypoglycemia     Kidney stone     Lyme disease     3 years ago    Migraine     not recent    Osteoporosis     borderline    Urinary incontinence     at times    Urinary urgency     Uterine prolapse     Wears dentures     bridge lower    Wears glasses        Past Surgical History:   Procedure Laterality Date    HI ARTHRP ACETBLR/PROX FEM PROSTC AGRFT/ALGRFT Left 4/29/2024    Procedure: ARTHROPLASTY HIP TOTAL;  Surgeon: Jethro Ray MD;  [facility];  Service: Orthopedics    HI CMBND ANTERPOST COLPORRAPHY W/CYSTO N/A 11/16/2020    Procedure: ANT COLPORRHAPHY;  Surgeon: Jerrell Zhu MD;  [facility];  Service: UroGynecology           HI COLPOPEXY VAGINAL EXTRAPERITONEAL APPROACH N/A 3/9/2020    Procedure: V.E.C.(ENPLACE);  Surgeon: Jerrell Zhu MD;  [facility];  Service: UroGynecology           HI COLPOPEXY VAGINAL EXTRAPERITONEAL APPROACH N/A 11/16/2020    Procedure: V.E.C. w/ enplace;  Surgeon: Jerrell Zhu MD;  [facility];  Service: UroGynecology           HI CYSTOURETHROSCOPY N/A 3/9/2020    Procedure: CYSTOSCOPY;   "Surgeon: Jerrell Zhu MD;  [facility];  Service: UroGynecology           UT CYSTOURETHROSCOPY N/A 11/16/2020    Procedure: CYSTOSCOPY;  Surgeon: Jerrell Zhu MD;  [facility];  Service: UroGynecology           UT POST COLPORRHAPHY RECTOCELE W/WO PERINEORRHAPHY N/A 3/9/2020    Procedure: ANTERIOR AND POST COLPORRHAPHY;  Surgeon: Jerrell Zhu MD;  [facility];  Service: UroGynecology           UT SLING OPERATION STRESS INCONTINENCE N/A 3/9/2020    Procedure: SINGLE INCISION SLING;  Surgeon: Jerrell Zhu MD;  [facility];  Service: UroGynecology           VAGINAL DELIVERY      \"47 yrs ago did have sedation\"    WISDOM TOOTH EXTRACTION         Family History   Problem Relation Age of Onset    Osteoporosis Mother     Stroke Father     Diabetes Father     Diabetes Sister        Social History     Occupational History    Occupation:      Comment: retired   Tobacco Use    Smoking status: Never    Smokeless tobacco: Never   Vaping Use    Vaping status: Never Used   Substance and Sexual Activity    Alcohol use: Never    Drug use: No    Sexual activity: Not Currently         Current Outpatient Medications:     ALPHA LIPOIC ACID PO, Take by mouth, Disp: , Rfl:     Bromelains (BROMELAIN PO), Take by mouth, Disp: , Rfl:     cephalexin (KEFLEX) 500 mg capsule, TAKE ONE (1) CAPSULE BY MOUTH 3 TIMES A DAY WITH FOOD (Patient not taking: Reported on 10/24/2024), Disp: , Rfl:     cholecalciferol (VITAMIN D3) 1,000 units tablet, Take 1,000 Units by mouth daily, Disp: , Rfl:     Coenzyme Q10 (COQ10 PO), Take by mouth, Disp: , Rfl:     dicyclomine (BENTYL) 20 mg tablet, Take 1 tablet (20 mg total) by mouth every 8 (eight) hours as needed (abdominal pain or diarrhea) (Patient not taking: Reported on 10/24/2024), Disp: 12 tablet, Rfl: 0    enoxaparin (LOVENOX) 40 mg/0.4 mL, Inject 0.4 mL (40 mg total) under the skin daily for 28 days To start Post-Op, Disp: 11.2 mL, Rfl: 0    " "Multiple Vitamins-Minerals (multivitamin with minerals) tablet, Take 1 tablet by mouth daily, Disp: 30 tablet, Rfl: 2    ondansetron (ZOFRAN-ODT) 4 mg disintegrating tablet, Take 1 tablet (4 mg total) by mouth every 8 (eight) hours as needed for vomiting or nausea (Patient not taking: Reported on 10/24/2024), Disp: 12 tablet, Rfl: 0    QUERCETIN PO, Take by mouth, Disp: , Rfl:     traMADol (Ultram) 50 mg tablet, Take 1 tablet (50 mg total) by mouth every 6 (six) hours as needed for moderate pain (Patient not taking: Reported on 7/5/2024), Disp: 20 tablet, Rfl: 0    Allergies   Allergen Reactions    Other Nausea Only and Headache     Chemicals and perfumes    Wheat Bran - Food Allergy Diarrhea     \"just wheat\"            Vitals:    10/29/24 0811   BP: 145/73   Pulse: 83       Body mass index is 25.24 kg/m².  Wt Readings from Last 3 Encounters:   10/24/24 75.3 kg (166 lb)   07/26/24 78.7 kg (173 lb 6.4 oz)   06/07/24 76.6 kg (168 lb 14.4 oz)       Objective:                    Right Knee Exam     Muscle Strength   The patient has normal right knee strength.    Tenderness   The patient is experiencing tenderness in the medial joint line.    Range of Motion   Extension:  0   Flexion:  110 (With crepitation and stiffness of full flexion)     Other   Erythema: absent  Sensation: normal  Swelling: mild  Effusion: no effusion present    Comments:    Varus alignment      Left Hip Exam     Other   Erythema: absent  Sensation: normal    Comments:    Well-healed posterior lateral incision.  No appreciable warmth.  Minimal swelling.  No drainage.  No signs of infection.  Painless arc of gentle passive range of motion all planes.  Fairly decent strength.    Rather essentially equally leg lengths            Diagnostics, reviewed and taken today if performed as documented:    The attending physician has personally reviewed the pertinent films in PACS and interpretation is as follows:    Left hip and pelvis x-rays taken and reviewed " "in the office today and show: Well aligned, position, bonded left total hip prosthesis without signs of loosening or stress shielding.  Rather well-preserved right femoral acetabular joint space.       Procedures, if performed today:    Procedures    None performed      Portions of the record may have been created with voice recognition software.  Occasional wrong word or \"sound a like\" substitutions may have occurred due to the inherent limitations of voice recognition software.  Read the chart carefully and recognize, using context, where substitutions have occurred.  "

## 2024-11-12 ENCOUNTER — APPOINTMENT (OUTPATIENT)
Dept: LAB | Facility: HOSPITAL | Age: 74
End: 2024-11-12
Payer: COMMERCIAL

## 2024-11-12 DIAGNOSIS — G89.29 CHRONIC PAIN OF RIGHT KNEE: ICD-10-CM

## 2024-11-12 DIAGNOSIS — R31.29 MICROHEMATURIA: ICD-10-CM

## 2024-11-12 DIAGNOSIS — M25.561 CHRONIC PAIN OF RIGHT KNEE: ICD-10-CM

## 2024-11-12 DIAGNOSIS — M17.11 PRIMARY OSTEOARTHRITIS OF RIGHT KNEE: ICD-10-CM

## 2024-11-12 LAB
ANION GAP SERPL CALCULATED.3IONS-SCNC: 7 MMOL/L (ref 4–13)
BUN SERPL-MCNC: 15 MG/DL (ref 5–25)
CALCIUM SERPL-MCNC: 9.3 MG/DL (ref 8.4–10.2)
CHLORIDE SERPL-SCNC: 105 MMOL/L (ref 96–108)
CO2 SERPL-SCNC: 29 MMOL/L (ref 21–32)
CREAT SERPL-MCNC: 0.6 MG/DL (ref 0.6–1.3)
GFR SERPL CREATININE-BSD FRML MDRD: 89 ML/MIN/1.73SQ M
GLUCOSE P FAST SERPL-MCNC: 80 MG/DL (ref 65–99)
POTASSIUM SERPL-SCNC: 3.8 MMOL/L (ref 3.5–5.3)
SODIUM SERPL-SCNC: 141 MMOL/L (ref 135–147)

## 2024-11-12 PROCEDURE — 80048 BASIC METABOLIC PNL TOTAL CA: CPT

## 2024-11-12 PROCEDURE — 36415 COLL VENOUS BLD VENIPUNCTURE: CPT

## 2024-11-13 RX ORDER — FOLIC ACID 1 MG/1
1000 TABLET ORAL DAILY
Qty: 90 TABLET | Refills: 1 | Status: SHIPPED | OUTPATIENT
Start: 2024-11-13

## 2024-12-02 ENCOUNTER — TELEPHONE (OUTPATIENT)
Dept: OBGYN CLINIC | Facility: HOSPITAL | Age: 74
End: 2024-12-02

## 2024-12-02 NOTE — TELEPHONE ENCOUNTER
"Preoperative Elective Admission Assessment: spoke to patient to confirm     PAT lab work/testin/9-     Living Situation:    Who does pt live with: spouse  What kind of home: multi-level  How do they enter the home: front  How many levels in home: 2   # of steps to enter home: 3-4 w/ handrail  # of steps to second floor: 12  Are there handrails: Yes  Are there landings: No  Sleeping arrangement: first/entry floor  Where is Bathroom: entry level full bath  Where is the tub or shower: Step in bath tub w/ grab bar  Dogs or ther pets: n/a     First Floor Setup:   Is there a bathroom: Yes  Where would pt sleep: bed     DME: rolling walker- instructed to bring DOS.   We discussed clearing pathways in the home and making sure there is accessibly to use the walker, for example, removing throw rugs.      Patient's Current Level of Function: Ambulates: Independently and Independent w ADLs     Post-op Caregiver: Tasneem (friend)Cynthia (friend, Corrina (friend)- she states \" a bunch of lady friends\"   Currently receive any HHC/aides/community supports: No     Post-op Transport: relative  To/from hospital: Friend, Tasneem   To/from PT 2-3x/week: Relatives -- previously had HHC, educated on how home PT vs. outpt PT is determined (while inpt).   Uses community transport now: No     Outpatient Physical Therapy Site:  Site: [facility]   pre and post-op appts scheduled? Yes     Medication Management: self and out of bottle  Preferred Pharmacy for Post-op Medications: [id number] - [place] - [phone]]   Blood Management Vitamin Regimen: Pt has at home to start 30 days before surgery   Post-op anticoagulant: to be determined by surgical team postoperatively     DC Plan: Pt plans to be discharged home        Barriers to DC identified preoperatively: none identified     BMI: 25.24     Patient Education:  Pt educated on post-op pain, early mobilization (POD0), LOS goals, OP PT goals, and preoperative bathing. Patient " educated that our goal is to appropriately discharge patient based off their post-op function while striving to maintain maximal independence. The goal is to discharge patient to home and for them to attend outpatient physical therapy.     Assigned to care team? Yes    Bactroban and MRSA educated.   -----------------------------------------------------  [facility] Education/Preoperative Bathing   If you have a packet of Sebastian Wipes, Throw Out - not using at this time    Mupirocin/Bactroban will be at pharmacy as a script from surgeon- use in Nose 2x day for 5 days preop    Preoperative Bathing is now using a CHG 8oz bottle (or two 4oz bottles,  at office or OTC Pharmacy) for 5 days before surgery.

## 2024-12-09 PROBLEM — M17.11 PRIMARY OSTEOARTHRITIS OF RIGHT KNEE: Status: ACTIVE | Noted: 2024-12-09

## 2024-12-09 PROBLEM — Z46.89 PESSARY MAINTENANCE: Status: ACTIVE | Noted: 2021-10-29

## 2024-12-09 NOTE — PATIENT INSTRUCTIONS
BEFORE SURGERY    Contact your surgical nurse  navigator with any questions regarding preoperative plan or schedule.  Stop all over the counter supplements, herbal, naturopathic  medications for 1 week prior to surgery UNLESS prescribed by your surgeon  Hold NSAIDS (i.e. advil, alleve, motrin, ibuprofen, celebrex) minimum 5 days prior to surgery  Follow presurgical medication instructions provided by preadmission nursing team reviewed during your presurgery phone call  Strategies for optimizing your surgery through breathing exercises, nutrition and physical activity can be found at [email or web address]  Call [phone] with any presurgical concerns or medications questions or use the messaging feature in your [employer] vikram to contact your provider    AFTER SURGERY    Recommend using Tylenol ( acetaminophen ) 1000 mg every eight hours during the first week post discharge along with icing the area for 20 mins every 3-4 hours while awake can be helpful in reducing your need for post operative opioid use. This opioid sparing plan can be used along side your surgeons pain plan.  Use stool softener over the counter (colace) daily after surgery during the first 1-2 weeks to avoid post operative constipation issues  If no bowel movement within 3 days after surgery then use over the counter Miralax in addition to your stool softener   If cleared by your surgical team for activity then early and often walking is encouraged and can be important in prevention of post surgical blood clots. Additionally spend as much time out of bed as possible and allowed by your surgical team  Use your incentive spirometer twice per hour in the first seven days after surgery to help prevent post surgery lung complications and infections  It is very important you follow the instructions from your surgeon regarding any medications for after surgery blood clot prevention. Compliance with these medications is very important.  Call [phone] with  any post discharge concerns or medical issues or use the messaging feature in your [employer] vikram to contact your provider

## 2024-12-09 NOTE — PROGRESS NOTES
Internal Medicine Pre-Operative Evaluation:     Reason for Visit: Pre-operative Evaluation for Risk Stratification and Optimization    Patient ID: Anisa Rowe is a 74 y.o. female.     Case: [id number] Date/Time: 01/06/25 0745   Procedure: robotically assisted right total knee arthroplasty, all associated procedures as indicated (Right: Knee)   Anesthesia type: Choice   Diagnosis:      Primary osteoarthritis of right knee [M17.11]      Chronic pain of right knee [M25.561, G89.29]   Pre-op diagnosis:      Primary osteoarthritis of right knee [M17.11]      Chronic pain of right knee [M25.561, G89.29]   Location:  OR ROOM 04 / [facility]   Surgeons: Jethro Ray MD         Recommendations to Proceed withSurgery    Patient is considered to be Low risk for Medium risk procedure.     After evaluation and discussion with patient with emphasis that all surgery has some degree of inherent risk it is acknowledged by patient this risk is Acceptable.    Patient is optimized and may proceed with planned procedure.     Assessment    Pre-operative Medical Evaluation for planned surgery  Recommendations as listed in PLAN section below  Contact surgical nurse  navigator with any questions regarding preoperative plan or schedule.      Assessment & Plan  Primary osteoarthritis of right knee  Failed outpatient conservative measures  Electing to undergo arthroplasty    Preoperative clearance             Plan:     1. Further preoperative workup as follows:   - none no further testing required may proceed with surgery    2. Preoperative Medication Management Review performed by [facility] nursing  NO 12.18.2024    3. Patient requires further consultation with:   No Consults Required    4. Discharge Planning / Barriers to Discharge  none identified - patients has post discharge therapy plan in place, transportation arranged for discharge day, adequate family support at home to assist with  discharge to home.        Subjective:           History of Present Illness:     Anisa Rowe is a 74 y.o. female who presents to the office today for a preoperative consultation at the request of surgeon. The patient understands this is an elective procedure and not emergent. They are electing to undergo planned procedure with an understanding that all surgery has inherent risk. They have worked with their surgeon and failed conservative treatment measures. Today they present for preoperative risk assessment and recommendations for optimization in preparation for surgery.    Pt seen in surgical optimization center for upcoming proposed surgery. They have failed previous conservative measures and have elected surgical intervention.     Pt meets presurgical lab and BMI optimization goals.      Anisa Rowe has an IN HOSPITAL cardiac risk of RCI RISK CLASS I (0 risk factors, risk of major cardiac compl. appr. 0.5%) based on RCRI calculator    Cardiac Risk Estimation: per the Revised Cardiac Risk Index (Circ. 100:1043, 1999),         Pre-op Exam    Previous history of bleeding disorders or clots?: No  Previous Anesthesia reaction?: No  Prolonged steroid use in the last 6 months?: No    Assessment of Cardiac Risk:   - Unstable or severe angina or MI in the last 6 weeks or history of stent placement in the last year?: No   - Decompensated heart failure (e.g. New onset heart failure, NYHA  Class IV heart failure, or worsening existing heart failure)?: No  - Significant arrhythmias such as high grade AV block, symptomatic ventricular arrhythmia, newly recognized ventricular tachycardia, supraventricular tachycardia with resting heart rate >100, or symptomatic bradycardia?: No  - Severe heart valve disease including aortic stenosis or symptomatic mitral stenosis?: No      Pre-operative Risk Factors:  Elevated-risk surgery: No    History of cerebrovascular disease: No    History of ischemic heart disease: No  Pre-operative  "treatment with insulin: No  Pre-operative creatinine >2 mg/dL: No    History of congestive heart failure: No    Duke Activity Status Index (DASI):   DASI Total Score: 23.45  METs: 5.6        ROS: No TIA's or unusual headaches, no dysphagia.  No prolonged cough. No dyspnea or chest pain on exertion.  No abdominal pain, change in bowel habits, black or bloody stools.  No urinary tract or BPH symptoms.  Positive reported pain in arthritic joint. Positive difficulty with gait. No skin rashes or issues.      Objective:    /96   Pulse 98   Ht 5' 7\" (1.702 m)   Wt 79 kg (174 lb 3.2 oz)   SpO2 98%   BMI 27.28 kg/m²       General Appearance: no distress, conversive  HEENT: PERRLA, conjuctiva normal; oropharynx clear; mucous membranes moist;   Neck:  Supple, no lymphadenopathy or thyromegaly  Lungs: breath sounds normal, normal respiratory effort, no retractions, expiratory effort normal  CV: normal heart sounds S1/S2, PMI normal   ABD: soft non tender, no masses , no hepatic or splenomegaly  EXT: DP pulses intact, no lymphadenopathy, no edema  Skin: normal turgor, normal texture, no rash  Psych: affect normal, mood normal  Neuro: AAOx3        The following portions of the patient's history were reviewed and updated as appropriate: allergies, current medications, past family history, past medical history, past social history, past surgical history and problem list.     Past History:       Past Medical History:   Diagnosis Date    Arthritis     stiff knees    Exercises daily     Female bladder prolapse     VEC today 11/16/2020     pelvic repair 3/2020    Hyperlipidemia     borderline    Hypoglycemia     Kidney stone     Lyme disease     3 years ago    Migraine     not recent    Osteoporosis     borderline    Urinary incontinence     at times    Urinary urgency     Uterine prolapse     Wears dentures     bridge lower    Wears glasses     Past Surgical History:   Procedure Laterality Date    ND ARTHRP ACETBLR/PROX FEM " "PROSTC AGRFT/ALGRFT Left 4/29/2024    Procedure: ARTHROPLASTY HIP TOTAL;  Surgeon: Jethro Ray MD;  [facility];  Service: Orthopedics    NV CMBND ANTERPOST COLPORRAPHY W/CYSTO N/A 11/16/2020    Procedure: ANT COLPORRHAPHY;  Surgeon: Jerrell Zhu MD;  [facility];  Service: UroGynecology           NV COLPOPEXY VAGINAL EXTRAPERITONEAL APPROACH N/A 3/9/2020    Procedure: V.E.C.(ENPLACE);  Surgeon: Jerrell Zhu MD;  [facility];  Service: UroGynecology           NV COLPOPEXY VAGINAL EXTRAPERITONEAL APPROACH N/A 11/16/2020    Procedure: V.E.C. w/ enplace;  Surgeon: Jerrell Zhu MD;  [facility];  Service: UroGynecology           NV CYSTOURETHROSCOPY N/A 3/9/2020    Procedure: CYSTOSCOPY;  Surgeon: Jerrell Zhu MD;  [facility];  Service: UroGynecology           NV CYSTOURETHROSCOPY N/A 11/16/2020    Procedure: CYSTOSCOPY;  Surgeon: Jerrell Zhu MD;  [facility];  Service: UroGynecology           NV POST COLPORRHAPHY RECTOCELE W/WO PERINEORRHAPHY N/A 3/9/2020    Procedure: ANTERIOR AND POST COLPORRHAPHY;  Surgeon: Jerrell Zhu MD;  [facility];  Service: UroGynecology           NV SLING OPERATION STRESS INCONTINENCE N/A 3/9/2020    Procedure: SINGLE INCISION SLING;  Surgeon: Jerrell Zhu MD;  [facility];  Service: UroGynecology           VAGINAL DELIVERY      \"47 yrs ago did have sedation\"    WISDOM TOOTH EXTRACTION            Social History     Tobacco Use    Smoking status: Never    Smokeless tobacco: Never   Vaping Use    Vaping status: Never Used   Substance Use Topics    Alcohol use: Never    Drug use: No     Family History   Problem Relation Age of Onset    Osteoporosis Mother     Stroke Father     Diabetes Father     Diabetes Sister           Allergies:     Allergies   Allergen Reactions    Other Nausea Only and Headache     Chemicals and perfumes    Wheat Bran - Food Allergy Diarrhea     \"just wheat\" " "       Current Medications:     Current Outpatient Medications   Medication Instructions    ALPHA LIPOIC ACID PO Oral    ascorbic acid (VITAMIN C) 500 mg, Oral, 2 times daily    Bromelains (BROMELAIN PO) Oral    cephalexin (KEFLEX) 500 mg capsule TAKE ONE (1) CAPSULE BY MOUTH 3 TIMES A DAY WITH FOOD    cholecalciferol (VITAMIN D3) 1,000 Units, Oral, Daily    Coenzyme Q10 (COQ10 PO) Oral    dicyclomine (BENTYL) 20 mg, Oral, Every 8 hours PRN    enoxaparin (LOVENOX) 40 mg, Subcutaneous, Daily, To start Post-Op    enoxaparin (LOVENOX) 40 mg, Subcutaneous, Daily, To start postoperatively    ferrous sulfate 324 (65 Fe) mg Take one tablet daily.    folic acid (FOLVITE) 1,000 mcg, Oral, Daily    Multiple Vitamins-Minerals (multivitamin with minerals) tablet 1 tablet, Oral, Daily    mupirocin (BACTROBAN) 2 % ointment Topical, 2 times daily, Apply ointment in each nostril 2 times a day for 5 days including the morning of surgery.    ondansetron (ZOFRAN-ODT) 4 mg, Oral, Every 8 hours PRN    QUERCETIN PO Oral    traMADol (ULTRAM) 50 mg, Oral, Every 6 hours PRN           PRE-OP WORKSHEET DATA    Assessment of Pre-Operative Risks     MLJ Quality Hard Stops:    BMI (<40) : Estimated body mass index is 27.28 kg/m² as calculated from the following:    Height as of this encounter: 5' 7\" (1.702 m).    Weight as of this encounter: 79 kg (174 lb 3.2 oz).    Hgb ( >11):   Lab Results   Component Value Date    HGB 13.6 12/10/2024    HGB 13.4 08/28/2024    HGB 11.8 04/30/2024       HbA1c (<7.5) :   Lab Results   Component Value Date    HGBA1C 5.6 12/10/2024       GFR (>60) (Less then 45 = Nephrology consult):    Lab Results   Component Value Date    EGFR 86 12/10/2024    EGFR 89 11/12/2024    EGFR 92 09/06/2024            Pre-Op Data Reviewed:       Laboratory Results: I have personally reviewed the pertinent laboratory results/reports     EKG:I have personally reviewed pertinent reports.  . I personally reviewed and interpreted available " tracings in the electronic medical record    Encounter Date: 08/28/24   ECG 12 lead   Result Value    Ventricular Rate 79    Atrial Rate 79    [place] Interval 192    QRSD Interval 112    QT Interval 408    QTC Interval 467    P Axis 66    QRS Axis -53    T Wave Axis 74    Narrative    Normal sinus rhythm  Left axis deviation  Left anterior fascicular block  Moderate voltage criteria for LVH, may be normal variant  Abnormal ECG  When compared with ECG of 28-AUG-2024 11:53, (unconfirmed)  No significant change was found  Confirmed by Sheldon Stephenson ([id number]) on 8/29/2024 3:38:39 PM       OLD RECORDS: reviewed old records in the chart review section if EHR on day of visit.    Previous cardiopulmonary studies within the past year:  Echocardiogram: no   Cardiac Catheterization: no  Stress Test: no      Time of visit including pre-visit chart review, visit and post-visit coordination of plan and care , review of pre-surgical lab work, preparation and time spent documenting note in electronic medical record, time spent face-to-face in physical examination answering patient questions by care team 35 minutes             [facility]

## 2024-12-10 ENCOUNTER — APPOINTMENT (OUTPATIENT)
Dept: LAB | Facility: HOSPITAL | Age: 74
End: 2024-12-10
Payer: COMMERCIAL

## 2024-12-10 DIAGNOSIS — Z01.818 PREOP TESTING: ICD-10-CM

## 2024-12-10 DIAGNOSIS — Z01.812 PRE-OPERATIVE LABORATORY EXAMINATION: Primary | ICD-10-CM

## 2024-12-10 DIAGNOSIS — Z79.01 ANTICOAGULATION MANAGEMENT ENCOUNTER: ICD-10-CM

## 2024-12-10 DIAGNOSIS — Z01.810 PRE-OPERATIVE CARDIOVASCULAR EXAMINATION: ICD-10-CM

## 2024-12-10 DIAGNOSIS — Z51.81 ANTICOAGULATION MANAGEMENT ENCOUNTER: ICD-10-CM

## 2024-12-10 LAB
ALBUMIN SERPL BCG-MCNC: 4.1 G/DL (ref 3.5–5)
ALP SERPL-CCNC: 75 U/L (ref 34–104)
ALT SERPL W P-5'-P-CCNC: 13 U/L (ref 7–52)
ANION GAP SERPL CALCULATED.3IONS-SCNC: 5 MMOL/L (ref 4–13)
APTT PPP: 33 SECONDS (ref 23–34)
AST SERPL W P-5'-P-CCNC: 15 U/L (ref 13–39)
BASOPHILS # BLD AUTO: 0.03 THOUSANDS/ÂΜL (ref 0–0.1)
BASOPHILS NFR BLD AUTO: 1 % (ref 0–1)
BILIRUB SERPL-MCNC: 1.09 MG/DL (ref 0.2–1)
BUN SERPL-MCNC: 17 MG/DL (ref 5–25)
CALCIUM SERPL-MCNC: 9.4 MG/DL (ref 8.4–10.2)
CHLORIDE SERPL-SCNC: 106 MMOL/L (ref 96–108)
CO2 SERPL-SCNC: 30 MMOL/L (ref 21–32)
CREAT SERPL-MCNC: 0.66 MG/DL (ref 0.6–1.3)
EOSINOPHIL # BLD AUTO: 0.09 THOUSAND/ÂΜL (ref 0–0.61)
EOSINOPHIL NFR BLD AUTO: 2 % (ref 0–6)
ERYTHROCYTE [DISTWIDTH] IN BLOOD BY AUTOMATED COUNT: 13.1 % (ref 11.6–15.1)
EST. AVERAGE GLUCOSE BLD GHB EST-MCNC: 114 MG/DL
GFR SERPL CREATININE-BSD FRML MDRD: 86 ML/MIN/1.73SQ M
GLUCOSE P FAST SERPL-MCNC: 90 MG/DL (ref 65–99)
HBA1C MFR BLD: 5.6 %
HCT VFR BLD AUTO: 41.9 % (ref 34.8–46.1)
HGB BLD-MCNC: 13.6 G/DL (ref 11.5–15.4)
IMM GRANULOCYTES # BLD AUTO: 0.01 THOUSAND/UL (ref 0–0.2)
IMM GRANULOCYTES NFR BLD AUTO: 0 % (ref 0–2)
INR PPP: 0.96 (ref 0.85–1.19)
LYMPHOCYTES # BLD AUTO: 1.6 THOUSANDS/ÂΜL (ref 0.6–4.47)
LYMPHOCYTES NFR BLD AUTO: 35 % (ref 14–44)
MCH RBC QN AUTO: 29 PG (ref 26.8–34.3)
MCHC RBC AUTO-ENTMCNC: 32.5 G/DL (ref 31.4–37.4)
MCV RBC AUTO: 89 FL (ref 82–98)
MONOCYTES # BLD AUTO: 0.49 THOUSAND/ÂΜL (ref 0.17–1.22)
MONOCYTES NFR BLD AUTO: 11 % (ref 4–12)
NEUTROPHILS # BLD AUTO: 2.32 THOUSANDS/ÂΜL (ref 1.85–7.62)
NEUTS SEG NFR BLD AUTO: 51 % (ref 43–75)
NRBC BLD AUTO-RTO: 0 /100 WBCS
PLATELET # BLD AUTO: 225 THOUSANDS/UL (ref 149–390)
PMV BLD AUTO: 9.1 FL (ref 8.9–12.7)
POTASSIUM SERPL-SCNC: 4 MMOL/L (ref 3.5–5.3)
PROT SERPL-MCNC: 6.7 G/DL (ref 6.4–8.4)
PROTHROMBIN TIME: 13.5 SECONDS (ref 12.3–15)
RBC # BLD AUTO: 4.69 MILLION/UL (ref 3.81–5.12)
SODIUM SERPL-SCNC: 141 MMOL/L (ref 135–147)
WBC # BLD AUTO: 4.54 THOUSAND/UL (ref 4.31–10.16)

## 2024-12-10 PROCEDURE — 86900 BLOOD TYPING SEROLOGIC ABO: CPT | Performed by: ORTHOPAEDIC SURGERY

## 2024-12-10 PROCEDURE — 80053 COMPREHEN METABOLIC PANEL: CPT

## 2024-12-10 PROCEDURE — 83036 HEMOGLOBIN GLYCOSYLATED A1C: CPT

## 2024-12-10 PROCEDURE — 85730 THROMBOPLASTIN TIME PARTIAL: CPT

## 2024-12-10 PROCEDURE — 36415 COLL VENOUS BLD VENIPUNCTURE: CPT

## 2024-12-10 PROCEDURE — 85025 COMPLETE CBC W/AUTO DIFF WBC: CPT

## 2024-12-10 PROCEDURE — 86850 RBC ANTIBODY SCREEN: CPT | Performed by: ORTHOPAEDIC SURGERY

## 2024-12-10 PROCEDURE — 86901 BLOOD TYPING SEROLOGIC RH(D): CPT | Performed by: ORTHOPAEDIC SURGERY

## 2024-12-10 PROCEDURE — 85610 PROTHROMBIN TIME: CPT

## 2024-12-10 PROCEDURE — 87081 CULTURE SCREEN ONLY: CPT

## 2024-12-11 ENCOUNTER — LAB REQUISITION (OUTPATIENT)
Dept: LAB | Facility: HOSPITAL | Age: 74
End: 2024-12-11
Payer: COMMERCIAL

## 2024-12-11 DIAGNOSIS — Z01.818 ENCOUNTER FOR OTHER PREPROCEDURAL EXAMINATION: ICD-10-CM

## 2024-12-11 LAB
ABO GROUP BLD: NORMAL
BLD GP AB SCN SERPL QL: NEGATIVE
MRSA NOSE QL CULT: NORMAL
RH BLD: NEGATIVE
SPECIMEN EXPIRATION DATE: NORMAL

## 2024-12-17 ENCOUNTER — OFFICE VISIT (OUTPATIENT)
Age: 74
End: 2024-12-17
Payer: COMMERCIAL

## 2024-12-17 VITALS
OXYGEN SATURATION: 98 % | WEIGHT: 174.2 LBS | SYSTOLIC BLOOD PRESSURE: 151 MMHG | HEART RATE: 98 BPM | BODY MASS INDEX: 27.34 KG/M2 | HEIGHT: 67 IN | DIASTOLIC BLOOD PRESSURE: 96 MMHG

## 2024-12-17 DIAGNOSIS — M17.11 PRIMARY OSTEOARTHRITIS OF RIGHT KNEE: ICD-10-CM

## 2024-12-17 DIAGNOSIS — Z01.818 PREOPERATIVE CLEARANCE: Primary | ICD-10-CM

## 2024-12-17 PROCEDURE — 99204 OFFICE O/P NEW MOD 45 MIN: CPT | Performed by: NURSE PRACTITIONER

## 2024-12-18 RX ORDER — CALCIUM CARBONATE/VITAMIN D3 600 MG-10
TABLET ORAL DAILY
COMMUNITY

## 2024-12-18 NOTE — PRE-PROCEDURE INSTRUCTIONS
Pre-Surgery Instructions:   Medication Instructions    ascorbic acid (VITAMIN C) 500 MG tablet Hold day of surgery.    Bromelains (BROMELAIN PO) Stop taking 7 days prior to surgery.    cholecalciferol (VITAMIN D3) 1,000 units tablet Stop taking 7 days prior to surgery.    ferrous sulfate 324 (65 Fe) mg Hold day of surgery.    folic acid (FOLVITE) 1 mg tablet Hold day of surgery.    Multiple Vitamins-Minerals (multivitamin with minerals) tablet Hold day of surgery.    Omega 3 1200 MG CAPS Stop taking 7 days prior to surgery.    Medication instructions for day surgery reviewed. Please use only a sip of water to take your instructed medications. Avoid all over the counter vitamins, supplements and NSAIDS for one week prior to surgery per anesthesia guidelines. Tylenol is ok to take as needed.     You will receive a call one business day prior to surgery with an arrival time and hospital directions. If your surgery is scheduled on a Monday, the hospital will be calling you on the Friday prior to your surgery. If you have not heard from anyone by 8pm, please call the hospital supervisor through the hospital  at [phone]. ([place] [phone] or [place] [phone]).    Do not eat or drink anything after midnight the night before your surgery, including candy, mints, lifesavers, or chewing gum. Do not drink alcohol 24hrs before your surgery. Try not to smoke at least 24hrs before your surgery.       Follow the pre surgery showering instructions as listed in the “My Surgical Experience Booklet” or otherwise provided by your surgeon's office. Do not use a blade to shave the surgical area 1 week before surgery. It is okay to use a clean electric clippers up to 24 hours before surgery. Do not apply any lotions, creams, including makeup, cologne, deodorant, or perfumes after showering on the day of your surgery. Do not use dry shampoo, hair spray, hair gel, or any type of hair products.     No contact lenses,  "eye make-up, or artificial eyelashes. Remove nail polish, including gel polish, and any artificial, gel, or acrylic nails if possible. Remove all jewelry including rings and body piercing jewelry.     Wear causal clothing that is easy to take on and off. Consider your type of surgery.    Keep any valuables, jewelry, piercings at home. Please bring any specially ordered equipment (sling, braces) if indicated.    Arrange for a responsible person to drive you to and from the hospital on the day of your surgery. Please confirm the visitor policy for the day of your procedure when you receive your phone call with an arrival time.     Call the surgeon's office with any new illnesses, exposures, or additional questions prior to surgery.    Please reference your “My Surgical Experience Booklet” for additional information to prepare for your upcoming surgery.    Pt instructed to stop nsaids one week prior to surgery. Pt verbalized understanding of med, mupirocin and shower instructions.     See Geriatric Assessment below...  Cognitive Assessment: phone assessment appointment   CAM:   TUG <15 sec:  Falls (last 6 months): none   Hand  score:  -Attempt 1:  -Attempt 2:  -Attempt 3:  Sha Total Score: 22  PHQ- 9 Depression Scale:4  Nutrition Assessment Score:14  METS:8.91  Incentive Spirometry Level:   Health goals:  -What are your overall health goals? (quit smoking, wt. loss, rest, decrease stress)\"to walk in the woods again\"     -What brings you strength? (family, friends, Hindu, health) \"family\"     -What activities are important to you? (exercise, reading, travel, work)\"gardening\"                                                                                          Arrange for a responsible person to drive you to and from the hospital on the day of your surgery. Please confirm   "

## 2024-12-23 ENCOUNTER — ANESTHESIA EVENT (OUTPATIENT)
Age: 74
End: 2024-12-23

## 2024-12-23 ENCOUNTER — ANESTHESIA (OUTPATIENT)
Age: 74
End: 2024-12-23

## 2024-12-30 ENCOUNTER — NURSE TRIAGE (OUTPATIENT)
Age: 74
End: 2024-12-30

## 2024-12-30 NOTE — TELEPHONE ENCOUNTER
Patient called again looking for an update. I advised to the patient the providers have 24 to 48 hours to respond back to messages and the office will reach out to her once they review the information. Patient verbalized understanding.

## 2024-12-30 NOTE — TELEPHONE ENCOUNTER
Patient called in with her nurse friend on the line as well who said they spoke with patient's PCP who states they do not believe patient will need to have kidney stone surgery prior to knee replacement procedure. They were calling to let provider know she will be going forward with knee replacement next week. No further assistance needed.

## 2024-12-30 NOTE — TELEPHONE ENCOUNTER
"Answer Assessment - Initial Assessment Questions  1. REASON FOR CALL: \"What is the main reason for your call?\" or \"How can I best help you?\"    Patient called in stating she is scheduled for knee replacement surgery on 1/6/25 and PCP is unsure if they are going to perform procedure on patient since she has kidney stones. States her PCP needs to know what the treatment is for kidney stones going forward. Patient did not have a recent CT although it was ordered in October. She had an US in September. Please advise as patient needs to inform her PCP asap.    Protocols used: Information Only Call - No Triage-Adult-[place]    "

## 2025-01-02 ENCOUNTER — EVALUATION (OUTPATIENT)
Dept: PHYSICAL THERAPY | Facility: CLINIC | Age: 75
End: 2025-01-02
Payer: COMMERCIAL

## 2025-01-02 DIAGNOSIS — G89.29 CHRONIC PAIN OF RIGHT KNEE: ICD-10-CM

## 2025-01-02 DIAGNOSIS — Z96.651 AFTERCARE FOLLOWING RIGHT KNEE JOINT REPLACEMENT SURGERY: ICD-10-CM

## 2025-01-02 DIAGNOSIS — M17.11 PRIMARY OSTEOARTHRITIS OF RIGHT KNEE: ICD-10-CM

## 2025-01-02 DIAGNOSIS — Z47.1 AFTERCARE FOLLOWING RIGHT KNEE JOINT REPLACEMENT SURGERY: ICD-10-CM

## 2025-01-02 DIAGNOSIS — M25.561 CHRONIC PAIN OF RIGHT KNEE: ICD-10-CM

## 2025-01-02 PROCEDURE — 97110 THERAPEUTIC EXERCISES: CPT | Performed by: PHYSICAL THERAPIST

## 2025-01-02 PROCEDURE — 97161 PT EVAL LOW COMPLEX 20 MIN: CPT | Performed by: PHYSICAL THERAPIST

## 2025-01-02 NOTE — DISCHARGE INSTR - AVS FIRST PAGE
Discharge Instructions: Knee replacement with Dr. Ray    Weight Bearing Status:                                           Weight Bearing as tolerated to the right lower extremity with assistive devices.     Pain Management/Medications  You may resume your usual medications.  You may stop pre-operative vitamins (Folic acid, Ferrous sulfate, and Vitamin C).   Please take the following medications:  Anti-coagulation (blood clot prevention) - Complete Lovenox injections for 28 days.   Pain medication:  Norco 5-325 m tablet every 6 hours as needed for severe pain  Robaxin (Muscle relaxer) 500 m tablet up to 3 times a day as needed for mild pain and muscle discomfort  Tylenol 1000 mg: up to three times daily as needed for mild to moderate pain. Do not exceed 3000mg daily   Continue applying ice to your knee on and off for about 20 minutes as needed.  Continue to elevate your operative leg, with ankle above the level of your heart when possible.    If you have questions or pain concerns, please contact the office.   If you need refills of your medications prior to your next office visit, please contact the office.     Showering/Dressing Instructions:   Do not shower until first follow up appointment.  Keep surgical dressing clean and dry until follow up appointment.  You may adjust the ACE bandage as it slides down   No baths, swimming, or submerging your leg until cleared to do so.      Driving Instructions:  No driving until cleared by Orthopaedic Surgery.    PT/OT:  Continue PT/OT on outpatient basis as directed    Follow up instructions:   Follow up as scheduled on 2025 in [place]  If you need to change or cancel your appointment for any reason, please call the clinic at [phone]    Please contact the office if you experience any of the following:  Excessive bleeding (bleeding through your dressing)  Fever greater than 101 degrees F after 48 hours (low grade fevers the day or two after surgery  are normal)  Persistent nausea or vomiting  Decreased sensation or discoloration of the operative limb  Pain or swelling that is getting worse and not better with medication    Miscellaneous:  Advise against any dental cleanings or procedures for 3 months after surgery.   - If there is a dental emergency, please contact the office for further instructions.

## 2025-01-02 NOTE — PROGRESS NOTES
PT Evaluation     Today's date: 2025  Patient name: Anisa Rowe  : 1950  MRN: [id number]  Referring provider: Jethro Ray,*  Dx:   Encounter Diagnosis     ICD-10-CM    1. Primary osteoarthritis of right knee  M17.11 Ambulatory referral to Physical Therapy      2. Chronic pain of right knee  M25.561 Ambulatory referral to Physical Therapy    G89.29       3. Aftercare following right knee joint replacement surgery  Z47.1 Ambulatory referral to Physical Therapy    Z96.651                      Assessment    Assessment details: Patient is a 74-year-old female presenting to physical therapy for preoperative evaluation prior to undergoing a right total knee replacement.  Patient does demonstrate weakness in the knee extensors and knee flexors.  She also has limitations in her range of motion particularly with extension.  Education was provided to the patient regarding the rehab process following a knee replacement.  She did note that she is the primary caretaker for her  and and has a preference to start with home health versus starting with outpatient PT.  She is currently doing exercises at home which I reviewed with her along with providing updated exercises to prepare herself for surgery.  All questions were answered to the liking of the patient.  Functionally, she has difficulty walking, standing, rising from a chair, going up and down a flight of stairs, and performing squat based activities.  Patient would benefit from skilled physical therapy services to address impairments and maximize function.    Goals  STG - 4 weeks  Decrease subjective pain by 2 points  ROM 0-90*  LTG - 16 weeks  Able to go up and down a flight of stairs in a step through pattern without functional limitation  Able to rise from a chair without the use of upper extremities without functional limitation  Able to complete community ambulation without functional limitation  Able to manage symptoms independently.       Plan  Patient would benefit from: skilled physical therapy  Planned modality interventions: cryotherapy    Planned therapy interventions: manual therapy, neuromuscular re-education, strengthening, stretching, therapeutic activities, therapeutic exercise, IADL retraining, home exercise program, abdominal trunk stabilization, activity modification and balance    Frequency: 1-2x week  Duration in weeks: 16        Subjective Evaluation    History of Present Illness  Mechanism of injury: Patient is scheduled to have a R TKR on 25 with Dr Ray.  Patient does have stairs that she needs to use in the house but is capable of having a first floor set up.  She currently ambulates with a walking stick and notes that she does not feel strong in the leg.  She also reports that her balance feels off, noting having prior falls.  Patient goals are to return to walking in the woods and with being able to complete gardening activities.    Patient Goals  Patient goals for therapy: decreased pain, return to sport/leisure activities and increased strength    Pain  Current pain ratin  At worst pain rating: 10          Objective     Tenderness     Additional Tenderness Details  Denies tenderness to palpation     Active Range of Motion     Right Knee   Flexion: 110 degrees   Extension: 15 degrees     Passive Range of Motion     Right Knee   Flexion: 115 degrees   Extension: 10 degrees     Strength/Myotome Testing     Right Knee   Flexion: 4  Extension: 4  Quadriceps contraction: fair    Additional Strength Details  Able to complete 10 SLR             Precautions: L hip THR, R TKR 25, osteopenia    Access Code: [email or web address]  URL: [email or web address]  Date: 2025  Prepared by: Guido Cartwright      POC expires Unit limit Auth Expiration date PT/OT/ST + Visit Limit?   25 4 10/29/25 BOMN                           Visit/Unit Tracking  AUTH Status:  Date               Pending Used                Remaining                       Manuals                                                                 Neuro Re-Ed             Quad setting 10x                                                                                          Ther Ex             LAQ 20X            Heel slides 10x                                                                                          Ther Activity                                       Gait Training                                       Modalities

## 2025-01-06 ENCOUNTER — ANESTHESIA (OUTPATIENT)
Age: 75
DRG: 470 | End: 2025-01-06
Payer: COMMERCIAL

## 2025-01-06 ENCOUNTER — HOSPITAL ENCOUNTER (INPATIENT)
Age: 75
LOS: 2 days | Discharge: HOME HEALTH | DRG: 470 | End: 2025-01-08
Attending: ORTHOPAEDIC SURGERY | Admitting: ORTHOPAEDIC SURGERY
Payer: COMMERCIAL

## 2025-01-06 ENCOUNTER — ANESTHESIA EVENT (OUTPATIENT)
Age: 75
DRG: 470 | End: 2025-01-06
Payer: COMMERCIAL

## 2025-01-06 DIAGNOSIS — G89.29 CHRONIC PAIN OF RIGHT KNEE: ICD-10-CM

## 2025-01-06 DIAGNOSIS — M17.11 PRIMARY OSTEOARTHRITIS OF RIGHT KNEE: Primary | ICD-10-CM

## 2025-01-06 DIAGNOSIS — M25.561 CHRONIC PAIN OF RIGHT KNEE: ICD-10-CM

## 2025-01-06 LAB
GLUCOSE SERPL-MCNC: 110 MG/DL (ref 65–140)
GLUCOSE SERPL-MCNC: 83 MG/DL (ref 65–140)

## 2025-01-06 PROCEDURE — 27447 TOTAL KNEE ARTHROPLASTY: CPT

## 2025-01-06 PROCEDURE — C1776 JOINT DEVICE (IMPLANTABLE): HCPCS | Performed by: ORTHOPAEDIC SURGERY

## 2025-01-06 PROCEDURE — 97163 PT EVAL HIGH COMPLEX 45 MIN: CPT | Performed by: PHYSICAL THERAPIST

## 2025-01-06 PROCEDURE — 82948 REAGENT STRIP/BLOOD GLUCOSE: CPT

## 2025-01-06 PROCEDURE — 27447 TOTAL KNEE ARTHROPLASTY: CPT | Performed by: ORTHOPAEDIC SURGERY

## 2025-01-06 PROCEDURE — 99222 1ST HOSP IP/OBS MODERATE 55: CPT | Performed by: INTERNAL MEDICINE

## 2025-01-06 PROCEDURE — C1713 ANCHOR/SCREW BN/BN,TIS/BN: HCPCS | Performed by: ORTHOPAEDIC SURGERY

## 2025-01-06 PROCEDURE — 97167 OT EVAL HIGH COMPLEX 60 MIN: CPT

## 2025-01-06 PROCEDURE — S2900 ROBOTIC SURGICAL SYSTEM: HCPCS | Performed by: ORTHOPAEDIC SURGERY

## 2025-01-06 PROCEDURE — 0SRC0J9 REPLACEMENT OF RIGHT KNEE JOINT WITH SYNTHETIC SUBSTITUTE, CEMENTED, OPEN APPROACH: ICD-10-PCS | Performed by: ORTHOPAEDIC SURGERY

## 2025-01-06 PROCEDURE — 8E0Y0CZ ROBOTIC ASSISTED PROCEDURE OF LOWER EXTREMITY, OPEN APPROACH: ICD-10-PCS | Performed by: ORTHOPAEDIC SURGERY

## 2025-01-06 PROCEDURE — NC001 PR NO CHARGE: Performed by: ORTHOPAEDIC SURGERY

## 2025-01-06 DEVICE — ATTUNE KNEE SYSTEM FEMORAL POSTERIOR STABILIZED NARROW SIZE 6N RIGHT CEMENTED
Type: IMPLANTABLE DEVICE | Site: KNEE | Status: FUNCTIONAL
Brand: ATTUNE

## 2025-01-06 DEVICE — ATTUNE KNEE SYSTEM TIBIAL INSERT FIXED BEARING POSTERIOR STABILIZED 6 7MM AOX
Type: IMPLANTABLE DEVICE | Site: KNEE | Status: FUNCTIONAL
Brand: ATTUNE

## 2025-01-06 DEVICE — SMARTSET HV HIGH VISCOSITY BONE CEMENT 40G
Type: IMPLANTABLE DEVICE | Status: FUNCTIONAL
Brand: SMARTSET

## 2025-01-06 DEVICE — ATTUNE KNEE SYSTEM TIBIAL BASE FIXED BEARING SIZE 6 CEMENTED
Type: IMPLANTABLE DEVICE | Site: KNEE | Status: FUNCTIONAL
Brand: ATTUNE

## 2025-01-06 DEVICE — ATTUNE PATELLA MEDIALIZED DOME 35MM CEMENTED AOX
Type: IMPLANTABLE DEVICE | Site: KNEE | Status: FUNCTIONAL
Brand: ATTUNE

## 2025-01-06 RX ORDER — SODIUM CHLORIDE, SODIUM LACTATE, POTASSIUM CHLORIDE, CALCIUM CHLORIDE 600; 310; 30; 20 MG/100ML; MG/100ML; MG/100ML; MG/100ML
125 INJECTION, SOLUTION INTRAVENOUS CONTINUOUS
Status: DISCONTINUED | OUTPATIENT
Start: 2025-01-06 | End: 2025-01-08 | Stop reason: HOSPADM

## 2025-01-06 RX ORDER — HYDROMORPHONE HCL IN WATER/PF 6 MG/30 ML
0.2 PATIENT CONTROLLED ANALGESIA SYRINGE INTRAVENOUS
Status: DISCONTINUED | OUTPATIENT
Start: 2025-01-06 | End: 2025-01-06 | Stop reason: HOSPADM

## 2025-01-06 RX ORDER — ONDANSETRON 2 MG/ML
4 INJECTION INTRAMUSCULAR; INTRAVENOUS ONCE AS NEEDED
Status: DISCONTINUED | OUTPATIENT
Start: 2025-01-06 | End: 2025-01-06 | Stop reason: HOSPADM

## 2025-01-06 RX ORDER — TRANEXAMIC ACID 10 MG/ML
1000 INJECTION, SOLUTION INTRAVENOUS ONCE
Status: COMPLETED | OUTPATIENT
Start: 2025-01-06 | End: 2025-01-06

## 2025-01-06 RX ORDER — FENTANYL CITRATE/PF 50 MCG/ML
25 SYRINGE (ML) INJECTION
Status: DISCONTINUED | OUTPATIENT
Start: 2025-01-06 | End: 2025-01-06 | Stop reason: HOSPADM

## 2025-01-06 RX ORDER — ENOXAPARIN SODIUM 100 MG/ML
40 INJECTION SUBCUTANEOUS DAILY
Status: DISCONTINUED | OUTPATIENT
Start: 2025-01-06 | End: 2025-01-08 | Stop reason: HOSPADM

## 2025-01-06 RX ORDER — OXYCODONE HYDROCHLORIDE 5 MG/1
5 TABLET ORAL EVERY 6 HOURS PRN
Qty: 30 TABLET | Refills: 0 | Status: SHIPPED | OUTPATIENT
Start: 2025-01-06 | End: 2025-01-08

## 2025-01-06 RX ORDER — GABAPENTIN 100 MG/1
100 CAPSULE ORAL EVERY 8 HOURS
Status: DISCONTINUED | OUTPATIENT
Start: 2025-01-06 | End: 2025-01-08 | Stop reason: HOSPADM

## 2025-01-06 RX ORDER — ACETAMINOPHEN 325 MG/1
975 TABLET ORAL EVERY 6 HOURS PRN
Status: DISCONTINUED | OUTPATIENT
Start: 2025-01-06 | End: 2025-01-07

## 2025-01-06 RX ORDER — OXYCODONE HYDROCHLORIDE 10 MG/1
10 TABLET ORAL EVERY 4 HOURS PRN
Status: DISCONTINUED | OUTPATIENT
Start: 2025-01-06 | End: 2025-01-07

## 2025-01-06 RX ORDER — BUPIVACAINE HYDROCHLORIDE 5 MG/ML
INJECTION, SOLUTION EPIDURAL; INTRACAUDAL
Status: COMPLETED | OUTPATIENT
Start: 2025-01-06 | End: 2025-01-06

## 2025-01-06 RX ORDER — ONDANSETRON 2 MG/ML
INJECTION INTRAMUSCULAR; INTRAVENOUS AS NEEDED
Status: DISCONTINUED | OUTPATIENT
Start: 2025-01-06 | End: 2025-01-06

## 2025-01-06 RX ORDER — ONDANSETRON 2 MG/ML
4 INJECTION INTRAMUSCULAR; INTRAVENOUS EVERY 6 HOURS PRN
Status: DISCONTINUED | OUTPATIENT
Start: 2025-01-06 | End: 2025-01-08 | Stop reason: HOSPADM

## 2025-01-06 RX ORDER — SODIUM CHLORIDE, SODIUM LACTATE, POTASSIUM CHLORIDE, CALCIUM CHLORIDE 600; 310; 30; 20 MG/100ML; MG/100ML; MG/100ML; MG/100ML
75 INJECTION, SOLUTION INTRAVENOUS CONTINUOUS
Status: DISCONTINUED | OUTPATIENT
Start: 2025-01-06 | End: 2025-01-08 | Stop reason: HOSPADM

## 2025-01-06 RX ORDER — BUPIVACAINE HYDROCHLORIDE 2.5 MG/ML
INJECTION, SOLUTION EPIDURAL; INFILTRATION; INTRACAUDAL
Status: COMPLETED | OUTPATIENT
Start: 2025-01-06 | End: 2025-01-06

## 2025-01-06 RX ORDER — DEXAMETHASONE SODIUM PHOSPHATE 10 MG/ML
INJECTION, SOLUTION INTRAMUSCULAR; INTRAVENOUS AS NEEDED
Status: DISCONTINUED | OUTPATIENT
Start: 2025-01-06 | End: 2025-01-06

## 2025-01-06 RX ORDER — ACETAMINOPHEN 500 MG
1000 TABLET ORAL EVERY 6 HOURS PRN
Qty: 90 TABLET | Refills: 0 | Status: SHIPPED | OUTPATIENT
Start: 2025-01-06

## 2025-01-06 RX ORDER — OXYCODONE HYDROCHLORIDE 5 MG/1
5 TABLET ORAL EVERY 4 HOURS PRN
Status: DISCONTINUED | OUTPATIENT
Start: 2025-01-06 | End: 2025-01-07

## 2025-01-06 RX ORDER — HYDROMORPHONE HCL/PF 1 MG/ML
0.5 SYRINGE (ML) INJECTION EVERY 2 HOUR PRN
Status: ACTIVE | OUTPATIENT
Start: 2025-01-06 | End: 2025-01-08

## 2025-01-06 RX ORDER — MAGNESIUM HYDROXIDE 1200 MG/15ML
LIQUID ORAL AS NEEDED
Status: DISCONTINUED | OUTPATIENT
Start: 2025-01-06 | End: 2025-01-06 | Stop reason: HOSPADM

## 2025-01-06 RX ORDER — CEFAZOLIN SODIUM 2 G/50ML
2000 SOLUTION INTRAVENOUS ONCE
Status: COMPLETED | OUTPATIENT
Start: 2025-01-06 | End: 2025-01-06

## 2025-01-06 RX ORDER — SODIUM CHLORIDE, SODIUM LACTATE, POTASSIUM CHLORIDE, CALCIUM CHLORIDE 600; 310; 30; 20 MG/100ML; MG/100ML; MG/100ML; MG/100ML
INJECTION, SOLUTION INTRAVENOUS CONTINUOUS PRN
Status: DISCONTINUED | OUTPATIENT
Start: 2025-01-06 | End: 2025-01-06

## 2025-01-06 RX ORDER — CHLORHEXIDINE GLUCONATE ORAL RINSE 1.2 MG/ML
15 SOLUTION DENTAL ONCE
Status: COMPLETED | OUTPATIENT
Start: 2025-01-06 | End: 2025-01-06

## 2025-01-06 RX ORDER — METHOCARBAMOL 500 MG/1
500 TABLET, FILM COATED ORAL 3 TIMES DAILY PRN
Qty: 60 TABLET | Refills: 0 | Status: SHIPPED | OUTPATIENT
Start: 2025-01-06

## 2025-01-06 RX ORDER — DOCUSATE SODIUM 100 MG/1
100 CAPSULE, LIQUID FILLED ORAL 2 TIMES DAILY
Status: DISCONTINUED | OUTPATIENT
Start: 2025-01-06 | End: 2025-01-08 | Stop reason: HOSPADM

## 2025-01-06 RX ORDER — PROPOFOL 10 MG/ML
INJECTION, EMULSION INTRAVENOUS CONTINUOUS PRN
Status: DISCONTINUED | OUTPATIENT
Start: 2025-01-06 | End: 2025-01-06

## 2025-01-06 RX ORDER — METHOCARBAMOL 500 MG/1
500 TABLET, FILM COATED ORAL EVERY 6 HOURS SCHEDULED
Status: DISCONTINUED | OUTPATIENT
Start: 2025-01-06 | End: 2025-01-08 | Stop reason: HOSPADM

## 2025-01-06 RX ORDER — CALCIUM CARBONATE 500 MG/1
1000 TABLET, CHEWABLE ORAL DAILY PRN
Status: DISCONTINUED | OUTPATIENT
Start: 2025-01-06 | End: 2025-01-08 | Stop reason: HOSPADM

## 2025-01-06 RX ORDER — MIDAZOLAM HYDROCHLORIDE 2 MG/2ML
2 INJECTION, SOLUTION INTRAMUSCULAR; INTRAVENOUS ONCE
Status: COMPLETED | OUTPATIENT
Start: 2025-01-06 | End: 2025-01-06

## 2025-01-06 RX ORDER — CEFAZOLIN SODIUM 1 G/50ML
1000 SOLUTION INTRAVENOUS EVERY 8 HOURS
Status: COMPLETED | OUTPATIENT
Start: 2025-01-06 | End: 2025-01-07

## 2025-01-06 RX ADMIN — BUPIVACAINE 20 ML: 13.3 INJECTION, SUSPENSION, LIPOSOMAL INFILTRATION at 07:55

## 2025-01-06 RX ADMIN — ACETAMINOPHEN 325MG 975 MG: 325 TABLET ORAL at 12:15

## 2025-01-06 RX ADMIN — ACETAMINOPHEN 325MG 975 MG: 325 TABLET ORAL at 20:31

## 2025-01-06 RX ADMIN — MIDAZOLAM 2 MG: 1 INJECTION INTRAMUSCULAR; INTRAVENOUS at 07:55

## 2025-01-06 RX ADMIN — SODIUM CHLORIDE, SODIUM LACTATE, POTASSIUM CHLORIDE, AND CALCIUM CHLORIDE 75 ML/HR: .6; .31; .03; .02 INJECTION, SOLUTION INTRAVENOUS at 06:22

## 2025-01-06 RX ADMIN — DOCUSATE SODIUM 100 MG: 100 CAPSULE, LIQUID FILLED ORAL at 18:50

## 2025-01-06 RX ADMIN — BUPIVACAINE HYDROCHLORIDE 20 ML: 2.5 INJECTION, SOLUTION EPIDURAL; INFILTRATION; INTRACAUDAL; PERINEURAL at 08:00

## 2025-01-06 RX ADMIN — METHOCARBAMOL TABLETS 500 MG: 500 TABLET, COATED ORAL at 18:50

## 2025-01-06 RX ADMIN — TRANEXAMIC ACID 1000 MG: 10 INJECTION, SOLUTION INTRAVENOUS at 08:16

## 2025-01-06 RX ADMIN — ONDANSETRON 4 MG: 2 INJECTION INTRAMUSCULAR; INTRAVENOUS at 08:22

## 2025-01-06 RX ADMIN — MEPIVACAINE HYDROCHLORIDE 3 ML: 15 INJECTION, SOLUTION EPIDURAL; INFILTRATION at 08:15

## 2025-01-06 RX ADMIN — DEXAMETHASONE SODIUM PHOSPHATE 10 MG: 10 INJECTION INTRAMUSCULAR; INTRAVENOUS at 08:22

## 2025-01-06 RX ADMIN — BUPIVACAINE HYDROCHLORIDE 10 ML: 5 INJECTION, SOLUTION EPIDURAL; INTRACAUDAL; PERINEURAL at 07:55

## 2025-01-06 RX ADMIN — PHENYLEPHRINE HYDROCHLORIDE 30 MCG/MIN: 10 INJECTION INTRAVENOUS at 08:16

## 2025-01-06 RX ADMIN — DOCUSATE SODIUM 100 MG: 100 CAPSULE, LIQUID FILLED ORAL at 12:16

## 2025-01-06 RX ADMIN — CHLORHEXIDINE GLUCONATE 15 ML: 1.2 SOLUTION ORAL at 06:22

## 2025-01-06 RX ADMIN — CEFAZOLIN SODIUM 2000 MG: 2 SOLUTION INTRAVENOUS at 08:14

## 2025-01-06 RX ADMIN — SODIUM CHLORIDE, SODIUM LACTATE, POTASSIUM CHLORIDE, AND CALCIUM CHLORIDE: .6; .31; .03; .02 INJECTION, SOLUTION INTRAVENOUS at 07:44

## 2025-01-06 RX ADMIN — PROPOFOL 100 MCG/KG/MIN: 10 INJECTION, EMULSION INTRAVENOUS at 08:16

## 2025-01-06 RX ADMIN — METHOCARBAMOL TABLETS 500 MG: 500 TABLET, COATED ORAL at 12:16

## 2025-01-06 RX ADMIN — SODIUM CHLORIDE, SODIUM LACTATE, POTASSIUM CHLORIDE, AND CALCIUM CHLORIDE 125 ML/HR: .6; .31; .03; .02 INJECTION, SOLUTION INTRAVENOUS at 10:59

## 2025-01-06 RX ADMIN — GABAPENTIN 100 MG: 100 CAPSULE ORAL at 12:16

## 2025-01-06 RX ADMIN — ENOXAPARIN SODIUM 40 MG: 40 INJECTION SUBCUTANEOUS at 20:30

## 2025-01-06 RX ADMIN — GABAPENTIN 100 MG: 100 CAPSULE ORAL at 20:31

## 2025-01-06 RX ADMIN — CEFAZOLIN SODIUM 1000 MG: 1 SOLUTION INTRAVENOUS at 16:35

## 2025-01-06 NOTE — ANESTHESIA PROCEDURE NOTES
Peripheral Block    Patient location during procedure: holding area  Start time: 1/6/2025 8:00 AM  Reason for block: at surgeon's request and post-op pain management  Staffing  Performed by: Dean Mejia MD  Authorized by: Dean Mejia MD    Preanesthetic Checklist  Completed: patient identified, IV checked, site marked, risks and benefits discussed, surgical consent, monitors and equipment checked, pre-op evaluation and timeout performed  Peripheral Block  Patient position: supine  Prep: ChloraPrep  Patient monitoring: frequent blood pressure checks, continuous pulse oximetry and heart rate  Block type: IPACK  Laterality: right  Injection technique: single-shot  Procedures: ultrasound guided, Ultrasound guidance required for the procedure to increase accuracy and safety of medication placement and decrease risk of complications.  Ultrasound permanent image saved  bupivacaine (PF) (MARCAINE) 0.25 % injection 20 mL - Perineural   20 mL - 1/6/2025 8:00:00 AM  Needle  Needle type: Stimuplex   Needle gauge: 20 G  Needle length: 4 in  Needle localization: anatomical landmarks and ultrasound guidance  Assessment  Injection assessment: incremental injection, frequent aspiration, injected with ease, negative aspiration, negative for heart rate change, no paresthesia on injection, no symptoms of intraneural/intravenous injection and needle tip visualized at all times  Paresthesia pain: none  Post-procedure:  site cleaned  patient tolerated the procedure well with no immediate complications

## 2025-01-06 NOTE — PLAN OF CARE
Problem: PAIN - ADULT  Goal: Verbalizes/displays adequate comfort level or baseline comfort level  Description: Interventions:  - Encourage patient to monitor pain and request assistance  - Assess pain using appropriate pain scale  - Administer analgesics based on type and severity of pain and evaluate response  - Implement non-pharmacological measures as appropriate and evaluate response  - Consider cultural and social influences on pain and pain management  - Notify physician/advanced practitioner if interventions unsuccessful or patient reports new pain  Outcome: Progressing     Problem: INFECTION - ADULT  Goal: Absence or prevention of progression during hospitalization  Description: INTERVENTIONS:  - Assess and monitor for signs and symptoms of infection  - Monitor lab/diagnostic results  - Monitor all insertion sites, i.e. indwelling lines, tubes, and drains  - Monitor endotracheal if appropriate and nasal secretions for changes in amount and color  - [place] appropriate cooling/warming therapies per order  - Administer medications as ordered  - Instruct and encourage patient and family to use good hand hygiene technique  - Identify and instruct in appropriate isolation precautions for identified infection/condition  Outcome: Progressing  Goal: Absence of fever/infection during neutropenic period  Description: INTERVENTIONS:  - Monitor WBC    Outcome: Progressing     Problem: SAFETY ADULT  Goal: Patient will remain free of falls  Description: INTERVENTIONS:  - Educate patient/family on patient safety including physical limitations  - Instruct patient to call for assistance with activity   - Consult OT/PT to assist with strengthening/mobility   - Keep Call bell within reach  - Keep bed low and locked with side rails adjusted as appropriate  - Keep care items and personal belongings within reach  - Initiate and maintain comfort rounds  - Make Fall Risk Sign visible to staff  - Offer Toileting every 2 Hours,  in advance of need  - Initiate/Maintain bed alarm  - Obtain necessary fall risk management equipment:   - Apply yellow socks and bracelet for high fall risk patients  - Consider moving patient to room near nurses station  Outcome: Progressing  Goal: Maintain or return to baseline ADL function  Description: INTERVENTIONS:  -  Assess patient's ability to carry out ADLs; assess patient's baseline for ADL function and identify physical deficits which impact ability to perform ADLs (bathing, care of mouth/teeth, toileting, grooming, dressing, etc.)  - Assess/evaluate cause of self-care deficits   - Assess range of motion  - Assess patient's mobility; develop plan if impaired  - Assess patient's need for assistive devices and provide as appropriate  - Encourage maximum independence but intervene and supervise when necessary  - Involve family in performance of ADLs  - Assess for home care needs following discharge   - Consider OT consult to assist with ADL evaluation and planning for discharge  - Provide patient education as appropriate  Outcome: Progressing  Goal: Maintains/Returns to pre admission functional level  Description: INTERVENTIONS:  - Perform AM-PAC 6 Click Basic Mobility/ Daily Activity assessment daily.  - Set and communicate daily mobility goal to care team and patient/family/caregiver.   - Collaborate with rehabilitation services on mobility goals if consulted  - Perform Range of Motion 3 times a day.  - Reposition patient every 3 hours.  - Dangle patient 3 times a day  - Stand patient 3 times a day  - Ambulate [id number] 3 times a day  - Out of bed to chair 3 times a day   - Out of bed for meals 3 times a day  - Out of bed for toileting  - Record patient progress and toleration of activity level   Outcome: Progressing     Problem: DISCHARGE PLANNING  Goal: Discharge to home or other facility with appropriate resources  Description: INTERVENTIONS:  - Identify barriers to discharge w/patient and caregiver  -  Arrange for needed discharge resources and transportation as appropriate  - Identify discharge learning needs (meds, wound care, etc.)  - Arrange for interpretive services to assist at discharge as needed  - Refer to Case Management Department for coordinating discharge planning if the patient needs post-hospital services based on physician/advanced practitioner order or complex needs related to functional status, cognitive ability, or social support system  Outcome: Progressing

## 2025-01-06 NOTE — PLAN OF CARE
Problem: PHYSICAL THERAPY ADULT  Goal: Performs mobility at highest level of function for planned discharge setting.  See evaluation for individualized goals.  Description: Treatment/Interventions: Functional transfer training, LE strengthening/ROM, Elevations, Therapeutic exercise, Endurance training, Patient/family training, Bed mobility, Gait training, Spoke to nursing, OT  Equipment Recommended: Walker (pt owns)       See flowsheet documentation for full assessment, interventions and recommendations.  Note: Prognosis: Fair  Problem List: Decreased strength, Decreased range of motion, Decreased endurance, Impaired balance, Decreased mobility, Pain  Assessment: Pt. 74 y.o.female presents for elective surgery. Past medical hx includes hx of L YNOATAN, hx of lyme disease, urinary incontinence. Pt admitted for Primary osteoarthritis of right knee w/ Primary osteoarthritis of right knee (M17.11)  Chronic pain of right knee (M25.561, G89.29). S/p R elective TKR POD #0. Pt referred to PT for functional mobility evaluation & D/C planning w/ orders of  ambulate pt with RW . WBAT RLE. PTA, pt reports being I w/o AD and (+) hx of falls . Personal factors affecting pt at time of IE include: dec caregiver support, decreased independence in ADLs/IADLs, steps to enter, and two story home. During evaluation, deficits included dec mobility, balance, ambulation. Required minAx1 for supine to sit and sit to stand, minAx2 for toilet transfers, modAx1 for ambulation and stand to sit. Use of BSC next to bed 2* R knee buckling. Pt able to ambulate 4'x2 with RW from bed to BSC. Antalgic step to gait with poor RW management and R knee buckling requiring inc assistance. Educated pt on importance of performing quad set to improve R knee activation. Pt demonstrated dec endurance and tolerance to activity. Denies reports of dizziness or SOB t/o session. Pt was educated on fall precautions and reinforced w/ good understanding. Pt would benefit  from continued PT to address deficits as defined above and maximize level of independence with functional mobility and safety. Based on pt presentation and impaired function, pt would benefit from level II, (moderate resource intensity) at D/C pending progress. The patient's AM-PAC Basic Mobility Inpatient Short Form Raw Score is 14. A Raw score of less than or equal to 16 suggests the patient may benefit from discharge to post-acute rehabilitation services. Please also refer to the recommendation of the Physical Therapist for safe discharge planning. Nsg staff to continue to mobilized pt (OOB in chair for all meals & ambulate in room/unit) as tolerated to prevent further decline in function. Nsg notified. Co-eval performed to complete this PT evaluation for the pts best interest given pts medical complexity and functional level.  Barriers to Discharge: Inaccessible home environment  Barriers to Discharge Comments: COLT  Rehab Resource Intensity Level, PT: II (Moderate Resource Intensity) (pending progress)    See flowsheet documentation for full assessment.

## 2025-01-06 NOTE — ANESTHESIA PREPROCEDURE EVALUATION
Procedure:  robotically assisted right total knee arthroplasty, all associated procedures as indicated (Right: Knee)     - denies any chest pain, palpitations, shortness of breath, syncope, lightheadedness, seizures   - denies any recent infectious symptoms such as fevers, chills, cough   - denies taking any anticoagulation medications or any issues with bleeding, bruising, clotting    Relevant Problems   ANESTHESIA (within normal limits)      CARDIO   (+) Mixed hyperlipidemia      ENDO (within normal limits)      GI/HEPATIC (within normal limits)      /RENAL (within normal limits)      GYN (within normal limits)      HEMATOLOGY (within normal limits)      MUSCULOSKELETAL   (+) Pelvic muscle wasting   (+) Primary osteoarthritis of one hip, left   (+) Primary osteoarthritis of right knee      NEURO/PSYCH (within normal limits)      PULMONARY (within normal limits)      Lab Results   Component Value Date    WBC 4.54 12/10/2024    HGB 13.6 12/10/2024    HCT 41.9 12/10/2024    MCV 89 12/10/2024     12/10/2024     Lab Results   Component Value Date    SODIUM 141 12/10/2024    K 4.0 12/10/2024     12/10/2024    CO2 30 12/10/2024    AGAP 5 12/10/2024    BUN 17 12/10/2024    CREATININE 0.66 12/10/2024    GLUC 85 09/06/2024    GLUF 90 12/10/2024    CALCIUM 9.4 12/10/2024    AST 15 12/10/2024    ALT 13 12/10/2024    ALKPHOS 75 12/10/2024    TP 6.7 12/10/2024    TBILI 1.09 (H) 12/10/2024    EGFR 86 12/10/2024     Lab Results   Component Value Date    PTT 33 12/10/2024     Lab Results   Component Value Date    INR 0.96 12/10/2024    INR 0.99 04/10/2024    INR 3.40 (A) 12/18/2023    PROTIME 13.5 12/10/2024    PROTIME 13.7 04/10/2024       Physical Exam    Airway    Mallampati score: II  TM Distance: >3 FB  Neck ROM: full     Dental       Cardiovascular  Rhythm: regular, Rate: normal, Cardiovascular exam normal    Pulmonary  Pulmonary exam normal Breath sounds clear to auscultation    Other  Findings  post-pubertal.      Anesthesia Plan  ASA Score- 2     Anesthesia Type- spinal with ASA Monitors.         Additional Monitors:     Airway Plan:     Comment: Spinal with IV sedation.       Plan Factors-Exercise tolerance (METS): >4 METS.    Chart reviewed. EKG reviewed. Imaging results reviewed. Existing labs reviewed. Patient summary reviewed.    Patient is not a current smoker.  Patient did not smoke on day of surgery.    Obstructive sleep apnea risk education given perioperatively.        Induction- intravenous.    Postoperative Plan- Plan for postoperative opioid use.         Informed Consent- Anesthetic plan and risks discussed with patient.  I personally reviewed this patient with the CRNA. Discussed and agreed on the Anesthesia Plan with the CRNA..

## 2025-01-06 NOTE — PHYSICAL THERAPY NOTE
PT EVALUATION    Pt. Name: Anisa Rowe  Pt. Age: 74 y.o.  MRN: [id number]  LENGTH OF STAY: 0    Patient Active Problem List   Diagnosis    Female bladder prolapse    Mixed hyperlipidemia    Primary insomnia    Osteopenia    Overweight (BMI 25.0-29.9)    Incomplete uterovaginal prolapse    Cystocele, midline    Pelvic muscle wasting    Other female genital prolapse    Primary osteoarthritis of one hip, left    Primary osteoarthritis of right knee    Pessary maintenance       Admitting Diagnoses:   Primary osteoarthritis of right knee [M17.11]  Chronic pain of right knee [M25.561, G89.29]    Past Medical History:   Diagnosis Date    Arthritis     stiff knees    Exercises daily     Female bladder prolapse     VEC today 11/16/2020     pelvic repair 3/2020    Hyperlipidemia     borderline    Hypoglycemia     Kidney stone     Lyme disease     3 years ago    Migraine     not recent    Osteoporosis     borderline    Urinary incontinence     at times    Urinary urgency     Uterine prolapse     Wears dentures     bridge lower    Wears glasses        Past Surgical History:   Procedure Laterality Date    IL ARTHRP ACETBLR/PROX FEM PROSTC AGRFT/ALGRFT Left 4/29/2024    Procedure: ARTHROPLASTY HIP TOTAL;  Surgeon: Jethro Ray MD;  [facility];  Service: Orthopedics    IL CMBND ANTERPOST COLPORRAPHY W/CYSTO N/A 11/16/2020    Procedure: ANT COLPORRHAPHY;  Surgeon: Jerrell Zhu MD;  [facility];  Service: UroGynecology           IL COLPOPEXY VAGINAL EXTRAPERITONEAL APPROACH N/A 3/9/2020    Procedure: V.E.C.(ENPLACE);  Surgeon: Jerrell Zhu MD;  [facility];  Service: UroGynecology           IL COLPOPEXY VAGINAL EXTRAPERITONEAL APPROACH N/A 11/16/2020    Procedure: V.E.C. w/ enplace;  Surgeon: Jerrell Zhu MD;  [facility];  Service: UroGynecology           IL CYSTOURETHROSCOPY N/A 3/9/2020    Procedure: CYSTOSCOPY;  Surgeon: Jerrell Zhu MD;  [facility];  Service: UroGynecology           [place] CYSTOURETHROSCOPY N/A 11/16/2020    Procedure: CYSTOSCOPY;  Surgeon: Jerrell Zhu MD;  [facility];  Service: UroGynecology           [place] POST COLPORRHAPHY RECTOCELE W/WO PERINEORRHAPHY N/A 3/9/2020    Procedure: ANTERIOR AND POST COLPORRHAPHY;  Surgeon: Jerrell Zhu MD;  [facility];  Service: UroGynecology           [place] SLING OPERATION STRESS INCONTINENCE N/A 3/9/2020    Procedure: SINGLE INCISION SLING;  Surgeon: Jerrell Zhu MD;  [facility];  Service: UroGynecology           VAGINAL DELIVERY      \"47 yrs ago did have sedation\"    WISDOM TOOTH EXTRACTION         Imaging Studies:  No orders to display        01/06/25 1331   PT Last Visit   PT Visit Date 01/06/25   Note Type   Note type Evaluation   Pain Assessment   Pain Assessment Tool 0-10   Pain Score 8   Pain Location/Orientation Orientation: Right;Location: [facility]   Hospital Pain Intervention(s) Cold applied;Ambulation/increased activity;Elevated;Emotional support;Rest   Restrictions/Precautions   Weight Bearing Precautions Per Order Yes   RLE Weight Bearing Per Order WBAT   Other Precautions Chair Alarm;Bed Alarm;WBS;Multiple lines;Fall Risk;Pain  (hemovac)   Home Living   Type of Home House   Home Layout Multi-level;Performs ADLs on one level;Able to live on main level with bedroom/bathroom;Bed/bath upstairs;Stairs to enter with rails  (4 COLT w/ L hand rail)   Bathroom Shower/Tub Tub/shower unit   Bathroom Toilet Standard  (has toilet raiser and BSC)   Bathroom Equipment Commode;Toilet raiser;Shower chair   Home Equipment Walker;Cane   Additional Comments Pt able to stay on main level with full bath if needed but bed/bath are upstairs. Pt is primary caregiver for  with parkinson's, reports he can not be home alone for long periods of time and needs assistance with dressing and meals.   Prior Function   Level of [place] Independent with ADLs;Independent with functional " mobility;Independent with IADLS  (w/o AD)   Lives With Spouse   Receives Help From Family;Friend(s)   IADLs Independent with driving;Independent with meal prep;Independent with medication management   Falls in the last 6 months 1 to 4   Vocational Full time employment   Comments (+) ; Spouse Freddy (has parkinson's), friends and family are able to help post op   General   Family/Caregiver Present Yes   Cognition   Overall Cognitive Status WFL   Arousal/Participation Alert   Attention Within functional limits   Orientation Level Oriented X4   Following Commands Follows all commands and directions without difficulty   Subjective   Subjective Pt agreeable to PT/OT evaluations.   RUE Assessment   RUE Assessment   (refer to OT)   LUE Assessment   LUE Assessment   (refer to OT)   RLE Assessment   RLE Assessment X   Strength RLE   R Knee Flexion 2+/5   R Knee Extension 2+/5   R Ankle Dorsiflexion 4-/5   R Ankle Plantar Flexion 4-/5   LLE Assessment   LLE Assessment WFL  (4/5 grossly)   Light Touch   RLE Light Touch Impaired   RLE Light Touch Comments foot feels heavy   LLE Light Touch Grossly intact   Bed Mobility   Supine to Sit 4  Minimal assistance   Additional items Assist x 1;HOB elevated;Bedrails;Increased time required;Verbal cues;LE management   Additional Comments Pt greeted in supine.   Transfers   Sit to Stand 4  Minimal assistance   Additional items Assist x 1;Bedrails;Increased time required;Verbal cues  (w/ RW)   Stand to Sit 3  Moderate assistance   Additional items Assist x 1;Increased time required;Verbal cues  (w/ RW)   Toilet transfer 4  Minimal assistance   Additional items Assist x 2;Armrests;Increased time required;Verbal cues;Commode  (w/ RW)   Additional Comments inc R knee buckling when going stand to sit therefore required inc assistance   Ambulation/Elevation   Gait pattern R Knee Vianney;Improper Weight shift;Antalgic;Decreased foot clearance;Decreased R stance;Short stride;Step  to;Excessively slow;Knees flexed   Gait Assistance 3  Moderate assist   Additional items Assist x 1;Verbal cues   Assistive Device Rolling walker   Distance 4'x2   Ambulation/Elevation Additional Comments cues to improve quad activation; reinforcement required   Balance   Static Sitting Fair +   Dynamic Sitting Fair   Static Standing Fair -  (w/ RW)   Dynamic Standing Poor  (w/ RW)   Ambulatory Poor  (w/ RW)   Endurance Deficit   Endurance Deficit Yes   Endurance Deficit Description dec R foot sensation, knee buckling   Activity Tolerance   Activity Tolerance Other (Comment);Patient limited by pain;Patient limited by fatigue  (knee buckling)   Medical Staff Made Aware OT Hamida   Nurse Made Aware RN Domitila   Assessment   Prognosis Fair   Problem List Decreased strength;Decreased range of motion;Decreased endurance;Impaired balance;Decreased mobility;Pain   Assessment Pt. 74 y.o.female presents for elective surgery. Past medical hx includes hx of L YONATAN, hx of lyme disease, urinary incontinence. Pt admitted for Primary osteoarthritis of right knee w/ Primary osteoarthritis of right knee (M17.11)  Chronic pain of right knee (M25.561, G89.29). S/p R elective TKR POD #0. Pt referred to PT for functional mobility evaluation & D/C planning w/ orders of  ambulate pt with RW . WBAT RLE. PTA, pt reports being I w/o AD and (+) hx of falls . Personal factors affecting pt at time of IE include: dec caregiver support, decreased independence in ADLs/IADLs, steps to enter, and two story home. During evaluation, deficits included dec mobility, balance, ambulation. Required minAx1 for supine to sit and sit to stand, minAx2 for toilet transfers, modAx1 for ambulation and stand to sit. Use of BSC next to bed 2* R knee buckling. Pt able to ambulate 4'x2 with RW from bed to BSC. Antalgic step to gait with poor RW management and R knee buckling requiring inc assistance. Educated pt on importance of performing quad set to improve R knee  activation. Pt demonstrated dec endurance and tolerance to activity. Denies reports of dizziness or SOB t/o session. Pt was educated on fall precautions and reinforced w/ good understanding. Pt would benefit from continued PT to address deficits as defined above and maximize level of independence with functional mobility and safety. Based on pt presentation and impaired function, pt would benefit from level II, (moderate resource intensity) at D/C pending progress. The patient's AM-PAC Basic Mobility Inpatient Short Form Raw Score is 14. A Raw score of less than or equal to 16 suggests the patient may benefit from discharge to post-acute rehabilitation services. Please also refer to the recommendation of the Physical Therapist for safe discharge planning. Nsg staff to continue to mobilized pt (OOB in chair for all meals & ambulate in room/unit) as tolerated to prevent further decline in function. Nsg notified. Co-eval performed to complete this PT evaluation for the pts best interest given pts medical complexity and functional level.   Barriers to Discharge Inaccessible home environment   Barriers to Discharge Comments COLT   Goals   Patient Goals to use bathroom   STG Expiration Date 01/13/25   Short Term Goal #1 1) Inc overall LE strength by 1/2 MMT grade to improve functional mobility; 2) Pt will demonstrate improved bed mobility with mod I to dec caregiver burden; 3) Pt will demonstrate improved transfers w/ mod I for inc safety; 4) Pt will be able to amb w/ mod I >150' w/ RW for household distances to inc safety and dec caregiver burden; 5) Pt will be able to navigate stairs with mod I to dec caregiver burden and inc safety with functional mobility; 6) Improve general balance by 1 grade to inc safety; 7) PT for ongoing patient and caregiver education   PT Treatment Day 0   Plan   Treatment/Interventions Functional transfer training;LE strengthening/ROM;Elevations;Therapeutic exercise;Endurance  training;Patient/family training;Bed mobility;Gait training;Spoke to nursing;OT   PT Frequency Twice a day  (PRN)   Discharge Recommendation   Rehab Resource Intensity Level, PT II (Moderate Resource Intensity)  (pending progress)   Equipment Recommended Walker  (pt owns)   AM-PAC Basic Mobility Inpatient   Turning in Flat Bed Without Bedrails 3   Lying on Back to Sitting on Edge of Flat Bed Without Bedrails 3   Moving Bed to Chair 2   Standing Up From Chair Using Arms 2   Walk in Room 2   Climb 3-5 Stairs With Railing 2   Basic Mobility Inpatient Raw Score 14   Basic Mobility Standardized Score 35.55   [facility] Highest Level Of Mobility   -[facility] Goal 4: Move to chair/commode   -HL Achieved 4: Move to chair/commode   End of Consult   Patient Position at End of Consult Bedside chair;Bed/Chair alarm activated;All needs within reach       Hx/personal factors: co-morbidities, inaccessible home, dec caregiver support, advanced age, mutliple lines, use of AD, pain, h/o of falls, and fall risk, coping styles, social background, past experience, behavior pattern  Examination: dec mobility, dec balance, dec endurance, dec amb, risk for falls, pain, assessed body system, balance, endurance, amb, D/C disposition & fall risk, impairements in locomotion, musculoskeletal, balance, endurance, posture, coordination, assessed cognition, impairments in systems including multiple body structures involved; musculoskeletal (ROM, strength, posture, BMI), neuromuscular (balance,locomotion, gait, transfers, motor control and learning, sensation), joint integrity, integumentary (skin integrity, presence of scars or wounds), cardiopulmonary (vitals, edema); activity limitations (difficulties executing an action); participation restrictions (problems associated w involvement in life situations)  Clinical: unpredictable (ongoing medical status, risk for falls, POD #0, and pain mgt)  Complexity: high      Allison Guthrie, PT

## 2025-01-06 NOTE — ANESTHESIA POSTPROCEDURE EVALUATION
Post-Op Assessment Note    CV Status:  Stable  Pain Score: 0    Pain management: adequate       Mental Status:  Alert and awake   Hydration Status:  Euvolemic   PONV Controlled:  Controlled   Airway Patency:  Patent     Post Op Vitals Reviewed: Yes    No anethesia notable event occurred.    Staff: Anesthesiologist, CRNA           Last Filed PACU Vitals:  Vitals Value Taken Time   Temp 97.5 °F (36.4 °C) 01/06/25 1000   Pulse 71 01/06/25 1038   /64 01/06/25 1030   Resp 35 01/06/25 1038   SpO2 97 % 01/06/25 1038   Vitals shown include unfiled device data.    Modified Krishna:     Vitals Value Taken Time   Activity 1 01/06/25 1000   Respiration 2 01/06/25 1000   Circulation 2 01/06/25 1000   Consciousness 2 01/06/25 1000   Oxygen Saturation 2 01/06/25 1000     Modified Krishna Score: 9

## 2025-01-06 NOTE — PLAN OF CARE
Problem: OCCUPATIONAL THERAPY ADULT  Goal: Performs self-care activities at highest level of function for planned discharge setting.  See evaluation for individualized goals.  Description: Treatment Interventions: ADL retraining, Functional transfer training, Endurance training, Patient/family training, Equipment evaluation/education, Compensatory technique education, Continued evaluation, Energy conservation, Activityengagement          See flowsheet documentation for full assessment, interventions and recommendations.   Note: Limitation: Decreased ADL status, Decreased endurance, Decreased self-care trans, Decreased high-level ADLs  Prognosis: Good  Assessment: Pt is a 74 y.o. female seen for OT evaluation s/p adm to [facility] on 1/6/2025 w/ Primary osteoarthritis of right knee . Comorbidities affecting pt’s functional performance include a significant PMH of arthritis, hyperlipidemia, hypoglycemia, migraines, urinary incontinence. Pt with active OT orders and activity orders for OOB to chair. Pt lives in a multilevel house with 4 COLT w/HR. Pt has tub/shower and standard toilets with BSC and toilet raiser. At baseline, pt was independent with all ADLs/IADLs. 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 Pt completed supine to sit with Nancy. Sit to stand with Nancy and functional mobility from EOB to BSC with modA and use of RW. Pt completed toileting on BSC with maxA for toileting. Pt completed functional mobility from BSC to chair with modA and increased knee buckling. Upon evaluation, pt currently requires Nancy for UB ADLs, modA for LB ADLs, maxA for toileting, Nancy for bed mobility, modA for functional mobility, and Nancy for transfers 2* the following deficits impacting occupational performance: weakness, decreased strength , decreased balance, decreased activity tolerance, increased pain, and orthopedic restrictions. These impairments, as well at pt’s personal factors of: COTL home environment, steps within home  environment, difficulty performing ADLs, difficulty performing IADLs, difficulty performing transfers/mobility, WBS, fall risk , and new use of AD for functional transfers/mobility limit pt’s ability to safely engage in all baseline areas of occupation. Based on the aforementioned OT evaluation, functional performance deficits, and assessments, pt has been identified as a high complexity evaluation. Pt to continue to benefit from continued acute OT services during hospital stay to address defined deficits and to maximize level of functional independence in the following Occupational Performance areas: grooming, bathing/shower, toilet hygiene, dressing, health maintenance, functional mobility, community mobility, clothing management, cleaning, household maintenance, job performance/volunteering, and caregiver duties . From OT standpoint, recommend II; Moderate Resource Intensity(pending progress) upon D/C. OT will continue to follow pt 3-5x/wk.     Rehab Resource Intensity Level, OT: II (Moderate Resource Intensity) (pending progress)

## 2025-01-06 NOTE — ANESTHESIA PROCEDURE NOTES
Peripheral Block    Patient location during procedure: holding area  Start time: 1/6/2025 7:55 AM  Reason for block: at surgeon's request and post-op pain management  Staffing  Performed by: Dean Mejia MD  Authorized by: Dean Mejia MD    Preanesthetic Checklist  Completed: patient identified, IV checked, site marked, risks and benefits discussed, surgical consent, monitors and equipment checked, pre-op evaluation and timeout performed  Peripheral Block  Patient position: supine  Prep: ChloraPrep  Patient monitoring: frequent blood pressure checks, continuous pulse oximetry and heart rate  Block type: Adductor Canal  Laterality: right  Injection technique: single-shot  Procedures: ultrasound guided, Ultrasound guidance required for the procedure to increase accuracy and safety of medication placement and decrease risk of complications.  Ultrasound permanent image saved  bupivacaine (PF) (MARCAINE) 0.5 % injection 20 mL - Perineural   10 mL - 1/6/2025 7:55:00 AM  bupivacaine liposomal (EXPAREL) 1.3 % injection 20 mL - Perineural   20 mL - 1/6/2025 7:55:00 AM  Needle  Needle type: Stimuplex   Needle gauge: 20 G  Needle length: 4 in  Needle localization: anatomical landmarks and ultrasound guidance  Assessment  Injection assessment: incremental injection, frequent aspiration, injected with ease, negative aspiration, negative for heart rate change, no paresthesia on injection, no symptoms of intraneural/intravenous injection and needle tip visualized at all times  Paresthesia pain: none  Post-procedure:  site cleaned  patient tolerated the procedure well with no immediate complications

## 2025-01-06 NOTE — OCCUPATIONAL THERAPY NOTE
Occupational Therapy Evaluation     Patient Name: Anisa Rowe  Today's Date: 1/6/2025  Problem List  Principal Problem:    Primary osteoarthritis of right knee  Active Problems:    Mixed hyperlipidemia    Past Medical History  Past Medical History:   Diagnosis Date    Arthritis     stiff knees    Exercises daily     Female bladder prolapse     VEC today 11/16/2020     pelvic repair 3/2020    Hyperlipidemia     borderline    Hypoglycemia     Kidney stone     Lyme disease     3 years ago    Migraine     not recent    Osteoporosis     borderline    Urinary incontinence     at times    Urinary urgency     Uterine prolapse     Wears dentures     bridge lower    Wears glasses      Past Surgical History  Past Surgical History:   Procedure Laterality Date    ME ARTHRP ACETBLR/PROX FEM PROSTC AGRFT/ALGRFT Left 4/29/2024    Procedure: ARTHROPLASTY HIP TOTAL;  Surgeon: Jethro Ray MD;  [facility];  Service: Orthopedics    ME CMBND ANTERPOST COLPORRAPHY W/CYSTO N/A 11/16/2020    Procedure: ANT COLPORRHAPHY;  Surgeon: Jerrell Zhu MD;  [facility];  Service: UroGynecology           ME COLPOPEXY VAGINAL EXTRAPERITONEAL APPROACH N/A 3/9/2020    Procedure: V.E.C.(ENPLACE);  Surgeon: Jerrell Zhu MD;  [facility];  Service: UroGynecology           ME COLPOPEXY VAGINAL EXTRAPERITONEAL APPROACH N/A 11/16/2020    Procedure: V.E.C. w/ enplace;  Surgeon: Jerrell Zhu MD;  [facility];  Service: UroGynecology           ME CYSTOURETHROSCOPY N/A 3/9/2020    Procedure: CYSTOSCOPY;  Surgeon: Jerrell Zhu MD;  [facility];  Service: UroGynecology           ME CYSTOURETHROSCOPY N/A 11/16/2020    Procedure: CYSTOSCOPY;  Surgeon: Jerrell Zhu MD;  [facility];  Service: UroGynecology           ME POST COLPORRHAPHY RECTOCELE W/WO PERINEORRHAPHY N/A 3/9/2020    Procedure: ANTERIOR AND POST COLPORRHAPHY;  Surgeon: Jerrell Zhu MD;  [facility];   "Service: UroGynecology           VA SLING OPERATION STRESS INCONTINENCE N/A 3/9/2020    Procedure: SINGLE INCISION SLING;  Surgeon: Jerrell Zhu MD;  [facility];  Service: UroGynecology           VAGINAL DELIVERY      \"47 yrs ago did have sedation\"    WISDOM TOOTH EXTRACTION          01/06/25 1317   OT Last Visit   OT Visit Date 01/06/25   Note Type   Note type Evaluation   Additional Comments Pt greeted in supine, agreeable to OT evaluation   Pain Assessment   Pain Assessment Tool 0-10   Pain Score 8   Pain Location/Orientation Orientation: Right;Location: [facility]   Hospital Pain Intervention(s) Repositioned;Ambulation/increased activity;Elevated;Emotional support;Rest   Restrictions/Precautions   Weight Bearing Precautions Per Order Yes   RLE Weight Bearing Per Order WBAT   Other Precautions Chair Alarm;Bed Alarm;WBS;Multiple lines;Pain;Fall Risk  (hemovac)   Home Living   Type of Home House   Home Layout Multi-level;Performs ADLs on one level;Able to live on main level with bedroom/bathroom;Bed/bath upstairs;Stairs to enter with rails  (4 COLT w L handrail)   Bathroom Shower/Tub Tub/shower unit   Bathroom Toilet Standard  (has toilet raiser and BSC)   Bathroom Equipment Commode;Toilet raiser;Shower chair   Bathroom Accessibility Accessible   Home Equipment Walker;Cane   Additional Comments Pt able to stay on main level with full bath if needed but bed/bath are upstairs. Pt is primary caregiver for  with parkinson's, reports he can not be home alone for long periods of time and needs assistance with dressing and meals.   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) "   Comments (+) ; Spouse Freddy (has parkinson's), friends and family are able to help post op   Lifestyle   Autonomy Independent with all ADLs/IADLs, no AD use at baseline   Reciprocal Relationships Family/friends   Service to Others Retired   Intrinsic Gratification gardening   General   Family/Caregiver Present Yes   ADL   Where Assessed Edge of bed   Eating Assistance 5  Supervision/Setup   Grooming Assistance 4  Minimal Assistance   UB Bathing Assistance 4  Minimal Assistance   LB Bathing Assistance 3  Moderate Assistance   UB Dressing Assistance 4  Minimal Assistance   LB Dressing Assistance 3  Moderate Assistance   Toileting Assistance  2  Maximal Assistance   Bed Mobility   Supine to Sit 4  Minimal assistance   Additional items Assist x 1;Bedrails;HOB elevated;Increased time required;Verbal cues;LE management   Sit to Supine Unable to assess   Additional Comments Pt greeted in supine, ended session in chair   Transfers   Sit to Stand 4  Minimal assistance   Additional items Assist x 1;Bedrails;Increased time required;Verbal cues  (RW)   Stand to Sit 3  Moderate assistance   Additional items Assist x 1;Armrests;Increased time required;Verbal cues  (RW)   Toilet transfer 4  Minimal assistance   Additional items Assist x 2;Increased time required;Verbal cues;Commode;Other  (BSC setup near bed, use of RW)   Additional Comments verbal cues for hand placement and safety with RW. Pt with signifigant RLE knee buckling, verbal cues required to straighten RLE with functional mobility. MaxA for toilet transfer and toileting due to instability and knee buckling.   Functional Mobility   Functional Mobility 3  Moderate assistance   Additional Comments Pt completed short functional mobility from EOB to BSC then to chair with modAx1 and use of RW. Knee buckling in RLE noted. Significant verbal cues required to maintain safety with RW   Additional items Rolling walker   Balance   Static Sitting Fair +   Dynamic Sitting  Fair   Static Standing Fair -   Dynamic Standing Poor   Ambulatory Poor   Activity Tolerance   Activity Tolerance Patient limited by pain;Treatment limited secondary to medical complications (Comment)  (knee buckling)   Medical Staff Made Aware PT Allison, Pt seen for co-evaluation/treatment with skilled Physical Therapy due to pt's medical complexity, decreased endurance, overall functional level, overall safety, and post surgical day #0.   Nurse Made Aware HERNANDO Ramesh   RUE Assessment   RUE Assessment WFL   LUE Assessment   LUE Assessment WFL   Hand Function   Gross Motor Coordination Functional   Fine Motor Coordination Functional   Cognition   Overall Cognitive Status WFL   Arousal/Participation Alert;Cooperative   Attention Within functional limits   Orientation Level Oriented X4   Memory Within functional limits   Following Commands Follows all commands and directions without difficulty   Comments Cooperative   Assessment   Limitation Decreased ADL status;Decreased endurance;Decreased self-care trans;Decreased high-level ADLs   Prognosis Good   Assessment Pt is a 74 y.o. female seen for OT evaluation s/p adm to [facility] on 1/6/2025 w/ Primary osteoarthritis of right knee . Comorbidities affecting pt’s functional performance include a significant PMH of arthritis, hyperlipidemia, hypoglycemia, migraines, urinary incontinence. Pt with active OT orders and activity orders for OOB to chair. Pt lives in a multilevel house with 4 COLT w/HR. Pt has tub/shower and standard toilets with BSC and toilet raiser. At baseline, pt was independent with all ADLs/IADLs. 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 Pt completed supine to sit with Nancy. Sit to stand with Nancy and functional mobility from EOB to BSC with modA and use of RW. Pt completed toileting on BSC with maxA for toileting. Pt completed functional mobility from BSC to chair with modA and increased knee buckling. Upon evaluation, pt currently requires Nancy for UB ADLs,  modA for LB ADLs, maxA for toileting, Nancy for bed mobility, modA for functional mobility, and Nancy for transfers 2* the following deficits impacting occupational performance: weakness, decreased strength , decreased balance, decreased activity tolerance, increased pain, and orthopedic restrictions. These impairments, as well at pt’s personal factors of: COLT home environment, steps within home environment, difficulty performing ADLs, difficulty performing IADLs, difficulty performing transfers/mobility, WBS, fall risk , and new use of AD for functional transfers/mobility limit pt’s ability to safely engage in all baseline areas of occupation. Based on the aforementioned OT evaluation, functional performance deficits, and assessments, pt has been identified as a high complexity evaluation. Pt to continue to benefit from continued acute OT services during hospital stay to address defined deficits and to maximize level of functional independence in the following Occupational Performance areas: grooming, bathing/shower, toilet hygiene, dressing, health maintenance, functional mobility, community mobility, clothing management, cleaning, household maintenance, job performance/volunteering, and caregiver duties . From OT standpoint, recommend II; Moderate Resource Intensity(pending progress) upon D/C. OT will continue to follow pt 3-5x/wk.   Goals   Patient Goals to use the bathroom   STG Time Frame 1-3   Short Term Goal #1 Pt will improve activity tolerance to G for min 30 min treatment sessions for increase engagement in functional tasks   Short Term Goal #2 Pt will complete bed mobility at a mod I level w/ G balance/safety demonstrated to decrease caregiver assistance required   Short Term Goal  Pt will complete LB dressing/self care w/ mod I using adaptive device and DME as needed   LTG Time Frame 3-7   Long Term Goal #1 Pt will complete toileting w/ mod I w/ G hygiene/thoroughness using DME as needed   Long Term Goal  #2 Pt will improve functional transfers to mod I on/off all surfaces using DME as needed w/ G balance/safety   Long Term Goal Pt will improve functional mobility during ADL/IADL/leisure tasks to mod I using DME as needed w/ G balance/safety   Plan   Treatment Interventions ADL retraining;Functional transfer training;Endurance training;Patient/family training;Equipment evaluation/education;Compensatory technique education;Continued evaluation;Energy conservation;Activityengagement   Goal Expiration Date 01/13/25   OT Treatment Day 0   OT Frequency 3-5x/wk   Discharge Recommendation   Rehab Resource Intensity Level, OT II (Moderate Resource Intensity)  (pending progress)   Additional Comments  The patient's raw score on the AM-PAC Daily Activity Inpatient Short Form is 16 . A raw score of less than 19 suggests the patient may benefit from discharge to post-acute rehabilitation services. Please refer to the recommendation of the Occupational Therapist for safe discharge planning.   AM-PAC Daily Activity Inpatient   Lower Body Dressing 2   Bathing 2   Toileting 2   Upper Body Dressing 3   Grooming 3   Eating 4   Daily Activity Raw Score 16   Daily Activity Standardized Score (Calc for Raw Score >=11) 35.96   -PAC Applied Cognition Inpatient   Following a Speech/Presentation 4   Understanding Ordinary Conversation 4   Taking Medications 4   Remembering Where Things Are Placed or Put Away 4   Remembering List of 4-5 Errands 4   Taking Care of Complicated Tasks 4   Applied Cognition Raw Score 24   Applied Cognition Standardized Score 62.21   End of Consult   Education Provided Yes;Family or social support of family present for education by provider   Patient Position at End of Consult Bedside chair;Bed/Chair alarm activated;All needs within reach   Nurse Communication Nurse aware of consult   End of Consult Comments Pt seated OOB in chair with chair alarm activated at end of session. Call bell and phone within reach. All  needs met and pt reports no further questions for OT at this time.   Hamida Myrick, OT

## 2025-01-06 NOTE — ANESTHESIA PROCEDURE NOTES
Spinal Block    Patient location during procedure: OR  Start time: 1/6/2025 8:15 AM  Reason for block: procedure for pain and at surgeon's request  Staffing  Performed by: Alphonse Kang CRNA  Authorized by: Dean Mejia MD    Preanesthetic Checklist  Completed: patient identified, IV checked, site marked, risks and benefits discussed, surgical consent, monitors and equipment checked, pre-op evaluation and timeout performed  Spinal Block  Patient position: sitting  Prep: ChloraPrep and site prepped and draped  Patient monitoring: frequent blood pressure checks, continuous pulse ox and heart rate  Approach: midline  Location: L3-4  Needle  Needle type: Pencan   Needle gauge: 24 G  Needle length: 4 in  Assessment  Sensory level: T4  Injection Assessment:  negative aspiration for heme, no paresthesia on injection and positive aspiration for clear CSF.  Post-procedure:  site cleaned

## 2025-01-06 NOTE — ASSESSMENT & PLAN NOTE
Post operative pain management per ortho  Encourage incentive spirometry use  Bowel regimen to avoid post op narcotic induced constipation  Hypotensive and venofer protocols as required  DVT prophylaxis in place  Monitor cbc and bmp post op

## 2025-01-06 NOTE — CONSULTS
Assessment & Plan  Primary osteoarthritis of right knee  Post operative pain management per ortho  Encourage incentive spirometry use  Bowel regimen to avoid post op narcotic induced constipation  Hypotensive and venofer protocols as required  DVT prophylaxis in place  Monitor cbc and bmp post op  Mixed hyperlipidemia  Continue low cholesterol diet and statin therapy      PRE-OP HGB LEVEL:   Lab Results   Component Value Date    HGB 13.6 12/10/2024         Subjective/ HPI: Anisa Rowe was seen and examined. Hx of KNEE pain failed out patient conservative measures. Elected to undergo total KNEE arthroplasty We are asked to see patient for post op management of underlying medical co-morbidities as outlined above.           ROS:   A 10 point ROS was performed; negative except as noted above.       Past medical history    Past Medical History:   Diagnosis Date    Arthritis     stiff knees    Exercises daily     Female bladder prolapse     VEC today 11/16/2020     pelvic repair 3/2020    Hyperlipidemia     borderline    Hypoglycemia     Kidney stone     Lyme disease     3 years ago    Migraine     not recent    Osteoporosis     borderline    Urinary incontinence     at times    Urinary urgency     Uterine prolapse     Wears dentures     bridge lower    Wears glasses        Social History:    Substance Use History:   Social History     Substance and Sexual Activity   Alcohol Use Never     Social History     Tobacco Use   Smoking Status Never   Smokeless Tobacco Never     Social History     Substance and Sexual Activity   Drug Use Never       Family History:    Family history non-contributory      Review of Scheduled Meds:  Current Facility-Administered Medications   Medication Dose Route Frequency Provider Last Rate    acetaminophen  975 mg Oral Q6H PRN Piedad Mena PA-C      calcium carbonate  1,000 mg Oral Daily PRN Piedad Mena PA-C      cefazolin  1,000 mg Intravenous Q8H Piedad Mena PA-C      docusate sodium  100 mg  "Oral BID Piedad Ciano, PA-C      enoxaparin  40 mg Subcutaneous Daily Piedad Ciano, PA-C      gabapentin  100 mg Oral Q8H Piedad Ciano, PA-C      HYDROmorphone  0.5 mg Intravenous Q2H PRN Piedad Ciano, PA-C      lactated ringers  1,000 mL Intravenous Once PRN Piedad Ciano, PA-C      And    lactated ringers  1,000 mL Intravenous Once PRN Piedad Ciano, PA-C      lactated ringers  125 mL/hr Intravenous Continuous Piedad Ciano, PA-C 125 mL/hr (25 1059)    lactated ringers  75 mL/hr Intravenous Continuous Piedad Ciano, PA-C 75 mL/hr (25 0942)    methocarbamol  500 mg Oral Q6H JAYSHREE Piedad Ciano, PA-C      ondansetron  4 mg Intravenous Q6H PRN Piedad Ciano, PA-C      oxyCODONE  10 mg Oral Q4H PRN Piedad Ciano, PA-C      oxyCODONE  5 mg Oral Q4H PRN Piedad Ciano, PA-C      sodium chloride  1,000 mL Intravenous Once PRN Piedad Ciano, PA-C      And    sodium chloride  1,000 mL Intravenous Once PRN Piedad Ciano, PA-C         Labs:               Invalid input(s): \"LABGLOM\", \"CMP\"               Results from last 7 days   Lab Units 25  0945 25  0611   POC GLUCOSE mg/dl 110 83       Lab Results   Component Value Date    URINECX >100,000 cfu/ml 2024       Input and Output Summary (last 24 hours):       Intake/Output Summary (Last 24 hours) at 2025 1343  Last data filed at 2025 1201  Gross per 24 hour   Intake 2200 ml   Output 329 ml   Net 1871 ml       Imaging:     No orders to display       *Labs /Radiology studiesLabs reviewed  *Medications reviewed and reconciled as needed  *Please refer to order section for additional ordered labs studies  *Case discussed with primary attending during morning huddle case rounds    Vitals:   Temp (24hrs), Av.9 °F (36.6 °C), Min:97.4 °F (36.3 °C), Max:98.8 °F (37.1 °C)    Temp:  [97.4 °F (36.3 °C)-98.8 °F (37.1 °C)] 97.5 °F (36.4 °C)  HR:  [63-86] 86  Resp:  [14-30] 20  BP: (113-144)/(54-70) 133/70  SpO2:  [95 %-98 %] 98 %  Body mass index is 26.3 kg/m².     Physical " Exam:   General Appearance: no distress, conversive  HEENT: PERRLA, conjuctiva normal; oropharynx clear; mucous membranes moist;   Neck:  Supple, no lymphadenopathy or thyromegaly  Lungs: clear bilaterally, normal respiratory effort, no retractions, expiratory effort normal  CV: S1 S2, no murmurs rubs or gallops, rate is regular   ABD: soft non tender, +BS x4  EXT: DP pulses intact, no lymphadenopathy, no edema ;  right KNEE dressing in place  Skin: normal turgor, normal texture, no rash  Psych: affect normal, mood normal  Neuro: AAOx3          Invasive Devices       Peripheral Intravenous Line  Duration             Peripheral IV 01/06/25 Dorsal (posterior);Left Hand <1 day              Drain  Duration             Urethral Catheter Non-latex 16 Fr. 1763 days    Closed/Suction Drain Anterior;Right;Lateral Knee Accordion 18 Fr. <1 day                       Code Status: Level 1 - Full Code  Current Length of Stay: 0 day(s)    Total floor / unit time spent today 30 minutes  Coordination of patient's care was performed in conjunction with primary service. Time invested included review of patient's labs, vitals, and management of their comorbidities with continued monitoring, examination of patient as well as answering patient questions, documenting her findings and creating progress note in electronic medical record,  ordering appropriate diagnostic testing. Medical decision making for the day was made by supervising physician unless otherwise noted in their attestation statement.    ** Please Note: Fluency Direct voice to text software may have been used in the creation of this document. Audio transcription errors may occur**

## 2025-01-06 NOTE — OP NOTE
OPERATIVE REPORT  PATIENT NAME: Anisa Rowe  : 1950  MRN: [id number]  Pt Location:  [facility]OM 04    Surgery Date: 2025    Surgeons and Role:     * Jethro Ray MD - Primary     * Piedad Mena PA-C - Assisting     Preop Diagnosis:  Primary osteoarthritis of right knee [M17.11]  Chronic pain of right knee [M25.561, G89.29]    Post-Op Diagnosis Codes:     * Primary osteoarthritis of right knee [M17.11]     * Chronic pain of right knee [M25.561, G89.29]    Procedure(s):  Right - robotically assisted right total knee arthroplasty. all associated procedures as indicated    Specimens:  * No specimens in log *    Estimated Blood Loss:   Minimal    Drains:  Urethral Catheter Non-latex 16 Fr. (Active)   Number of days: 1764       Urethral Catheter Latex 16 Fr. (Active)   Number of days: 0       Anesthesia Type:   Choice     Operative Indications:  Primary osteoarthritis of right knee [M17.11]  Chronic pain of right knee [M25.561, G89.29]    Operative Findings:  Depuy attune   Femur-6N   Poly-7   Tibia-6   Patella-35    Complications:   None    Knee Technique: Suture (direct) Repair  Knee Approach: Medial Parapatellar    Chronic Narcotic Use:  No      Procedure and Technique:  Following the induction of adequate level of spinal anesthesia, a Abernathy catheter was then sterilely introduced into this patient's bladder.  Antibiotics were ministered.  The right thigh was not fitted with a thigh-high tourniquet.  The right lower extremity then underwent sterile prep and drape.  The right lower extremity was exsanguinated to gravity, and the tourniquet was inflated to 300 mmHg.  A midline knee incision was greater than in flexion.  Full-thickness flaps were raised in order to access the extensor mechanism.  A medial arthrotomy was created to open up the knee joint.  2 half pins were placed from the proximal tibia, 2 half pins in place with a distal femur.  In doing so, modules were created.  The arrays were  then attached to the modules.  Checkpoints made the proximal tibia distal femur.  The knee was then registered.  The surgery was planned out on the computer, the plan was finalized.  The robot was docked.  The first maneuver involve the distal femoral cut followed by the anterior posterior cuts.  The chamfer cuts completed the process.  The proximal tibia cut was made next.  Care was taken preserve the integrity of the medial collateral, lateral collateral, posterior structures during these maneuvers.  The box cut was then made for the posterior stabilized unit, and remnant medial and lateral meniscectomies then performed.  Trials were inserted, the knee was taken through a range of motion, and found to be capable full extension, good flexion, stable throughout.  The patella was then resurfaced will utilize the manufactures equipment, is found to be a size 35 mm button.  The trial components removed and the knee was prepared for insertion of cemented components.  The cemented tibia, cemented femur, trial poly-, cemented patella were then placed.  Excess cement was removed, the knee was brought to extension.  The cement was allowed to cure.  The trial poly was taken out, the knee was packed up.  The tourniquet was deflated, and hemostasis was secured.  The insert polyethylene was then snapped into position.  The knee was taken through a final range of motion, found to be capable full extension, good flexion, stable throughout, and excellent patella tracking.  Satisfied with the extent of surgery, the wounds then flushed with saline and closed.  A Betadine soak initiated.  A drain is placed deep and brought via separate lateral stab incision.  The arthrotomy was closed with number Vicryl suture.  The subcu tissue was closed with 2-0 Vicryl suture.  The skin was then closed with staples.  Sterile dressings were applied.  She was then transferred to recovery room in stable condition with plans include physical therapy  for weightbearing to tolerance.  She will require DVT prophylaxis with Lovenox   I was present for the entire procedure.  Please note, there is no qualified orthopedic resident was available assist, the assistance of Steve benavides PA-C was instrumental in the safe execution of this patient surgery.  Started patient position, patient prepped draped, IntraOp assistance, wound closure, dressing application, patient transfers, all of these were performed under my direct supervision    Patient Disposition:  PACU             SIGNATURE: Jethro Ray MD  DATE: January 6, 2025  TIME: 9:22 AM

## 2025-01-06 NOTE — H&P
H&P Exam - Orthopedics   Anisa Rowe 74 y.o. female MRN: [id number]      Assessment/Plan   Assessment:  right Knee Osteoarthritis in this adult female who continues to have both pain and dysfunction despite appropriate nonsurgical treatments    Plan:  right  Total Knee Arthroplasty.  Patient is made aware the risks, benefits, alternatives    TOTAL KNEE REPLACEMENT INDICATIONS AND RISKS    We had a lengthy discussion with the patient regarding the potential options for treatment. The patient has had an extensive conservative management course up until this time and therefore I do not feel that any additional conservative management will provide additional relief. The patient's symptoms have progressively worsened to the point where they now limit the patient's daily activities and quality of life.     At this time, I feel that this patient would be an excellent candidate for a total knee replacement. The patient has failed non-operative care and continues to have unacceptable symptoms. We discussed the treatment options and alternatives and the risks and benefits of surgery in great detail.    While no guarantees can be made, total knee replacement has a very high success rate in terms of relieving a patient's knee pain and returning them to a more active, independent lifestyle for 10-15 years or more. All surgery carries some risk; for knee replacement, the complication rate is low but may include: death (very rare), infection, bleeding requiring transfusion, blood clots in legs traveling to lungs, nerve and/or blood vessel damage, bone fracture, persistent knee pain and/or stiffness, and repeat surgery(ies). The risk of a major complication is about 1-2 per 1000 cases. Total knee replacement should only be done if conservative treatment has failed. The revision rate for total knee replacements is about 1-2% per year; in other words, 85-95% of knee replacements may last 10 years; 75-85% last 20 years; and so on,  assuming no injury to the knee. Finally we discussed anesthesia related complications which will be discussed in greater detail with the anesthesia team before surgery.     The patient was shown total knee booklets, diagrams and/or models and all of their questions have been answered at the present time. The patient may call or come in if they have any other concerns or questions. The patient also understands the post-operative rehabilitation process and the need for their cooperation and participation, and that their results may be compromised by their lack of compliance. The patient would like to proceed. Patient is encouraged to seek additional opinions if they so desire. The patient voiced their understanding of the surgical plan and potential complications and wishes to proceed with surgery.      History of Present Illness   HPI:  Anisa Rowe is a 74 y.o. female who presents with pain in the right knee. Patient is no longer getting adequate relief from non-operative modalities. Patient is continuing to have debilitating pain from their knee, interfering with daily activities and sleep. Patient denies any concerns with infections, new neuropathies, or any acute injuries.     Historical Information  Review Of Systems:   Skin: Normal  Neuro: See HPI  Musculoskeletal: See HPI  14 point review of systems negative except as stated above     Past Medical History:   Past Medical History:   Diagnosis Date    Arthritis     stiff knees    Exercises daily     Female bladder prolapse     VEC today 11/16/2020     pelvic repair 3/2020    Hyperlipidemia     borderline    Hypoglycemia     Kidney stone     Lyme disease     3 years ago    Migraine     not recent    Osteoporosis     borderline    Urinary incontinence     at times    Urinary urgency     Uterine prolapse     Wears dentures     bridge lower    Wears glasses        Past Surgical History:   Past Surgical History:   Procedure Laterality Date    IA ARTHRP ACETBLR/PROX  "FEM PROSTC AGRFT/ALGRFT Left 4/29/2024    Procedure: ARTHROPLASTY HIP TOTAL;  Surgeon: Jethro Ray MD;  [facility];  Service: Orthopedics    WV CMBND ANTERPOST COLPORRAPHY W/CYSTO N/A 11/16/2020    Procedure: ANT COLPORRHAPHY;  Surgeon: Jerrell Zhu MD;  [facility];  Service: UroGynecology           WV COLPOPEXY VAGINAL EXTRAPERITONEAL APPROACH N/A 3/9/2020    Procedure: V.E.C.(ENPLACE);  Surgeon: Jerrell Zhu MD;  [facility];  Service: UroGynecology           WV COLPOPEXY VAGINAL EXTRAPERITONEAL APPROACH N/A 11/16/2020    Procedure: V.E.C. w/ enplace;  Surgeon: Jerrell hZu MD;  [facility];  Service: UroGynecology           WV CYSTOURETHROSCOPY N/A 3/9/2020    Procedure: CYSTOSCOPY;  Surgeon: Jerrell Zhu MD;  [facility];  Service: UroGynecology           WV CYSTOURETHROSCOPY N/A 11/16/2020    Procedure: CYSTOSCOPY;  Surgeon: Jerrell Zhu MD;  [facility];  Service: UroGynecology           WV POST COLPORRHAPHY RECTOCELE W/WO PERINEORRHAPHY N/A 3/9/2020    Procedure: ANTERIOR AND POST COLPORRHAPHY;  Surgeon: Jerrell Zhu MD;  [facility];  Service: UroGynecology           WV SLING OPERATION STRESS INCONTINENCE N/A 3/9/2020    Procedure: SINGLE INCISION SLING;  Surgeon: Jerrell Zhu MD;  [facility];  Service: UroGynecology           VAGINAL DELIVERY      \"47 yrs ago did have sedation\"    WISDOM TOOTH EXTRACTION         Family History:  Family history reviewed and non-contributory  Family History   Problem Relation Age of Onset    Osteoporosis Mother     Stroke Father     Diabetes Father     Diabetes Sister        Social History:  Social History     Socioeconomic History    Marital status: Registered Domestic Partner     Spouse name: None    Number of children: None    Years of education: None    Highest education level: None   Occupational History    Occupation:      Comment: retired   Tobacco " Use    Smoking status: Never    Smokeless tobacco: Never   Vaping Use    Vaping status: Never Used   Substance and Sexual Activity    Alcohol use: Never    Drug use: Never    Sexual activity: Not Currently   Other Topics Concern    None   Social History Narrative    None     Social Drivers of Health     Financial Resource Strain: Low Risk  (2/14/2024)    Received from [facility]    Overall Financial Resource Strain (CARDIA)     Difficulty of Paying Living Expenses: Not very hard   Food Insecurity: No Food Insecurity (5/1/2024)    Nursing - Inadequate Food Risk Classification     Worried About Running Out of Food in the Last Year: Never true     Ran Out of Food in the Last Year: Never true     Ran Out of Food in the Last Year: Not on file   Transportation Needs: No Transportation Needs (5/1/2024)    PRAPARE - Transportation     Lack of Transportation (Medical): No     Lack of Transportation (Non-Medical): No   Physical Activity: Sufficiently Active (2/14/2024)    Received from [facility]    Exercise Vital Sign     Days of Exercise per Week: 4 days     Minutes of Exercise per Session: 60 min   Stress: No Stress Concern Present (2/14/2024)    Received from [facility]    [employer] - Occupational Stress Questionnaire     Feeling of Stress : Not at all   Social Connections: Unknown (6/18/2024)    Received from [facility]    Social Connections     How often do you feel lonely or isolated from those around you? (Adult - for ages 18 years and over): Not on file   Intimate Partner Violence: Not At Risk (2/14/2024)    Received from [facility]    Humiliation, Afraid, Rape, and Kick questionnaire     Fear of Current or Ex-Partner: No     Emotionally Abused: No     Physically Abused: No     Sexually Abused: No   Housing Stability: Low Risk  (5/1/2024)    Housing Stability Vital Sign     Unable to Pay for Housing in the Last Year: No     Number of Times Moved in  "the Last Year: 1     Homeless in the Last Year: No       Allergies:   Allergies   Allergen Reactions    Other Nausea Only and Headache     Chemicals and perfumes    Wheat Bran - Food Allergy Diarrhea     \"just wheat\"           Labs:  0   Lab Value Date/Time    HCT 41.9 12/10/2024 0914    HCT 40.1 08/28/2024 1156    HCT 35.6 04/30/2024 0552    HGB 13.6 12/10/2024 0914    HGB 13.4 08/28/2024 1156    HGB 11.8 04/30/2024 0552    INR 0.96 12/10/2024 0914    WBC 4.54 12/10/2024 0914    WBC 4.55 08/28/2024 1156    WBC 5.23 04/30/2024 0552       Meds:    Current Facility-Administered Medications:     bupivacaine liposomal (EXPAREL) 1.3 % injection 20 mL, 20 mL, Infiltration, Once, Dean Mejia MD    ceFAZolin (ANCEF) IVPB (premix in dextrose) 2,000 mg 50 mL, 2,000 mg, Intravenous, Once, Jethro Ray MD    lactated ringers infusion, 75 mL/hr, Intravenous, Continuous, Jethro Ray MD, Last Rate: 75 mL/hr at 01/06/25 0622, 75 mL/hr at 01/06/25 0622    tranexamic acid (CYKLOKAPRON) 1000-0.7 MG/100ML-% injection 1,000 mg, 1,000 mg, Intravenous, Once, Jethro Ray MD    Blood Culture:   No results found for: \"BLOODCX\"    Wound Culture:   No results found for: \"WOUNDCULT\"    Ins and Outs:  No intake/output data recorded.          Physical Exam  /60   Pulse 65   Temp 98.8 °F (37.1 °C) (Temporal)   Resp 16   Ht 5' 8\" (1.727 m)   Wt 78.5 kg (173 lb)   SpO2 97%   BMI 26.30 kg/m²   /60   Pulse 65   Temp 98.8 °F (37.1 °C) (Temporal)   Resp 16   Ht 5' 8\" (1.727 m)   Wt 78.5 kg (173 lb)   SpO2 97%   BMI 26.30 kg/m²   Gen: No acute distress, resting comfortably in bed  HEENT: Eyes clear, moist mucus membranes, hearing intact  Respiratory: No audible wheezing or stridor  Cardiovascular: Well Perfused peripherally, 2+ distal pulse  Abdomen: nondistended, no peritoneal signs  Ortho Exam: limited ROM due to pain and mechanical blocking  Neuro Exam: intact    Lab Results: " Reviewed  Imaging: Reviewed

## 2025-01-07 ENCOUNTER — TELEPHONE (OUTPATIENT)
Age: 75
End: 2025-01-07

## 2025-01-07 LAB
ALBUMIN SERPL BCG-MCNC: 3.5 G/DL (ref 3.5–5)
ALP SERPL-CCNC: 59 U/L (ref 34–104)
ALT SERPL W P-5'-P-CCNC: 12 U/L (ref 7–52)
ANION GAP SERPL CALCULATED.3IONS-SCNC: 5 MMOL/L (ref 4–13)
AST SERPL W P-5'-P-CCNC: 13 U/L (ref 13–39)
ATRIAL RATE: 108 BPM
BILIRUB SERPL-MCNC: 0.77 MG/DL (ref 0.2–1)
BUN SERPL-MCNC: 14 MG/DL (ref 5–25)
CALCIUM SERPL-MCNC: 8.6 MG/DL (ref 8.4–10.2)
CHLORIDE SERPL-SCNC: 108 MMOL/L (ref 96–108)
CO2 SERPL-SCNC: 26 MMOL/L (ref 21–32)
CREAT SERPL-MCNC: 0.57 MG/DL (ref 0.6–1.3)
ERYTHROCYTE [DISTWIDTH] IN BLOOD BY AUTOMATED COUNT: 12.4 % (ref 11.6–15.1)
GFR SERPL CREATININE-BSD FRML MDRD: 90 ML/MIN/1.73SQ M
GLUCOSE SERPL-MCNC: 112 MG/DL (ref 65–140)
HCT VFR BLD AUTO: 36.5 % (ref 34.8–46.1)
HGB BLD-MCNC: 12.4 G/DL (ref 11.5–15.4)
MCH RBC QN AUTO: 30.1 PG (ref 26.8–34.3)
MCHC RBC AUTO-ENTMCNC: 34 G/DL (ref 31.4–37.4)
MCV RBC AUTO: 89 FL (ref 82–98)
P AXIS: 47 DEGREES
PLATELET # BLD AUTO: 216 THOUSANDS/UL (ref 149–390)
PMV BLD AUTO: 9.6 FL (ref 8.9–12.7)
POTASSIUM SERPL-SCNC: 3.7 MMOL/L (ref 3.5–5.3)
PR INTERVAL: 168 MS
PROT SERPL-MCNC: 5.7 G/DL (ref 6.4–8.4)
QRS AXIS: -44 DEGREES
QRSD INTERVAL: 114 MS
QT INTERVAL: 344 MS
QTC INTERVAL: 460 MS
RBC # BLD AUTO: 4.12 MILLION/UL (ref 3.81–5.12)
SODIUM SERPL-SCNC: 139 MMOL/L (ref 135–147)
T WAVE AXIS: 92 DEGREES
VENTRICULAR RATE: 108 BPM
WBC # BLD AUTO: 11.45 THOUSAND/UL (ref 4.31–10.16)

## 2025-01-07 PROCEDURE — 93010 ELECTROCARDIOGRAM REPORT: CPT | Performed by: INTERNAL MEDICINE

## 2025-01-07 PROCEDURE — 97116 GAIT TRAINING THERAPY: CPT | Performed by: PHYSICAL THERAPIST

## 2025-01-07 PROCEDURE — 99232 SBSQ HOSP IP/OBS MODERATE 35: CPT | Performed by: INTERNAL MEDICINE

## 2025-01-07 PROCEDURE — 99024 POSTOP FOLLOW-UP VISIT: CPT | Performed by: PHYSICIAN ASSISTANT

## 2025-01-07 PROCEDURE — 85027 COMPLETE CBC AUTOMATED: CPT

## 2025-01-07 PROCEDURE — 80053 COMPREHEN METABOLIC PANEL: CPT

## 2025-01-07 PROCEDURE — 97535 SELF CARE MNGMENT TRAINING: CPT

## 2025-01-07 PROCEDURE — 97530 THERAPEUTIC ACTIVITIES: CPT

## 2025-01-07 PROCEDURE — 93005 ELECTROCARDIOGRAM TRACING: CPT

## 2025-01-07 PROCEDURE — 97530 THERAPEUTIC ACTIVITIES: CPT | Performed by: PHYSICAL THERAPIST

## 2025-01-07 PROCEDURE — NC001 PR NO CHARGE: Performed by: INTERNAL MEDICINE

## 2025-01-07 RX ORDER — ACETAMINOPHEN 325 MG/1
650 TABLET ORAL EVERY 8 HOURS PRN
Status: DISCONTINUED | OUTPATIENT
Start: 2025-01-07 | End: 2025-01-08 | Stop reason: HOSPADM

## 2025-01-07 RX ORDER — HYDROCODONE BITARTRATE AND ACETAMINOPHEN 5; 325 MG/1; MG/1
1 TABLET ORAL EVERY 6 HOURS PRN
Refills: 0 | Status: DISCONTINUED | OUTPATIENT
Start: 2025-01-07 | End: 2025-01-08 | Stop reason: HOSPADM

## 2025-01-07 RX ADMIN — ACETAMINOPHEN 325MG 650 MG: 325 TABLET ORAL at 20:42

## 2025-01-07 RX ADMIN — GABAPENTIN 100 MG: 100 CAPSULE ORAL at 20:43

## 2025-01-07 RX ADMIN — DOCUSATE SODIUM 100 MG: 100 CAPSULE, LIQUID FILLED ORAL at 17:04

## 2025-01-07 RX ADMIN — GABAPENTIN 100 MG: 100 CAPSULE ORAL at 12:16

## 2025-01-07 RX ADMIN — GABAPENTIN 100 MG: 100 CAPSULE ORAL at 05:10

## 2025-01-07 RX ADMIN — ACETAMINOPHEN 325MG 975 MG: 325 TABLET ORAL at 03:05

## 2025-01-07 RX ADMIN — METHOCARBAMOL TABLETS 500 MG: 500 TABLET, COATED ORAL at 17:04

## 2025-01-07 RX ADMIN — DOCUSATE SODIUM 100 MG: 100 CAPSULE, LIQUID FILLED ORAL at 08:11

## 2025-01-07 RX ADMIN — METHOCARBAMOL TABLETS 500 MG: 500 TABLET, COATED ORAL at 23:38

## 2025-01-07 RX ADMIN — ACETAMINOPHEN 325MG 650 MG: 325 TABLET ORAL at 12:16

## 2025-01-07 RX ADMIN — METHOCARBAMOL TABLETS 500 MG: 500 TABLET, COATED ORAL at 12:16

## 2025-01-07 RX ADMIN — ENOXAPARIN SODIUM 40 MG: 40 INJECTION SUBCUTANEOUS at 08:11

## 2025-01-07 RX ADMIN — METHOCARBAMOL TABLETS 500 MG: 500 TABLET, COATED ORAL at 00:56

## 2025-01-07 RX ADMIN — CEFAZOLIN SODIUM 1000 MG: 1 SOLUTION INTRAVENOUS at 00:56

## 2025-01-07 RX ADMIN — HYDROCODONE BITARTRATE AND ACETAMINOPHEN 1 TABLET: 5; 325 TABLET ORAL at 15:43

## 2025-01-07 RX ADMIN — HYDROCODONE BITARTRATE AND ACETAMINOPHEN 1 TABLET: 5; 325 TABLET ORAL at 21:46

## 2025-01-07 RX ADMIN — METHOCARBAMOL TABLETS 500 MG: 500 TABLET, COATED ORAL at 05:24

## 2025-01-07 RX ADMIN — HYDROCODONE BITARTRATE AND ACETAMINOPHEN 1 TABLET: 5; 325 TABLET ORAL at 09:34

## 2025-01-07 NOTE — OCCUPATIONAL THERAPY NOTE
Occupational Therapy Progress Note     Patient Name: Anisa Rowe  Today's Date: 1/7/2025  Problem List  Principal Problem:    Primary osteoarthritis of right knee  Active Problems:    Mixed hyperlipidemia            01/07/25 1419   OT Last Visit   OT Visit Date 01/07/25   Note Type   Note Type Treatment   Pain Assessment   Pain Assessment Tool 0-10   Pain Score 4   Pain Location/Orientation Orientation: Left;Location: Knee   Hospital Pain Intervention(s) Repositioned;Ambulation/increased activity;Cold applied   Restrictions/Precautions   Weight Bearing Precautions Per Order Yes   RLE Weight Bearing Per Order WBAT   Other Precautions Chair Alarm;Bed Alarm;Pain;Fall Risk;WBS   Lifestyle   Autonomy Independent with all ADLs/IADLs, no AD use at baseline   Reciprocal Relationships Family/friends   Service to Others Retired   Intrinsic Gratification gardening   ADL   Where Assessed Commode   Grooming Assistance 5  Supervision/Setup   Grooming Deficit Wash/dry hands  (supine in bed with use of wipe.)   Toileting Assistance  4  Minimal Assistance   Toileting Deficit Setup;Supervison/safety;Increased time to complete;Bedside commode;Clothing management down;Clothing management up   Toileting Comments min A transfer, S clothing management, GARCIA hygiene, Pt with use of BSC at home and able to use over toilet/next to chair   Bed Mobility   Sit to Supine 4  Minimal assistance   Additional items Assist x 1;Increased time required;LE management   Additional Comments Pt greeted seated on EOB.   Transfers   Sit to Stand 4  Minimal assistance   Additional items Assist x 1;Increased time required;Verbal cues   Stand to Sit 4  Minimal assistance   Additional items Assist x 1;Increased time required;Verbal cues   Toilet transfer 4  Minimal assistance   Additional items Assist x 1;Increased time required;Commode   Additional Comments with RW   Functional Mobility   Functional Mobility 3 Mod assistance   Additional Comments Mod AX1 with  RW. Pt with RLE buckling and requires cues to maintain upright position.   Additional items Rolling walker   Cognition   Overall Cognitive Status WFL   Arousal/Participation Alert;Cooperative   Attention Within functional limits   Orientation Level Oriented X4   Memory Within functional limits   Following Commands Follows all commands and directions without difficulty   Comments Pt pleasant and cooeprative during OT session. Pt continues to require signicant cues for safety, however, towards end of session with G carryover.   Activity Tolerance   Activity Tolerance Patient limited by fatigue   Medical Staff Made Aware Kayce VILLAGOMEZ cleared/updated. Portions of tx completed with LULA Castaneda 2* to Pt's medical complexity and decreased endurance. OT focus on maximizing independence with ADLs.   Assessment   Assessment Pt greeted up in recliner chair for OT treatment on 01/07/25 focusing on maximizing independence with ADLs. Pt completes functional transfers with min AX1 and functional mobility with min Ax1 with RW. Pt continues to be limited by RLE buckling and poor posture with mobility. Pt requires max cues for safety/technique and progresses to min cues at end of session with fair carryover. Pt also reports she is limited by fatigue this session. Pt min A with toileting routine with use of BSC and S with grooming. Pt returned supine in bed at end of session with min Ax1 with LE management. Pt increased to BID PRN to assist with safe DC planning. Limitations that impact functional performance include decreased ADL status, UE ROM, UE strength, safe judgement during ADLs, cognition, endurance, self care transfers, and high level ADLs. Occupational performance areas to address ADL retraining, functional transfer training, UE strengthening/ROM, endurance training, cognitive reorientation, Pt/caregiver education, equipment evaluation/education, compensatory technique education, energy conservation, and activity engagement . Pt  would benefit from continued skilled OT services while in hospital to maximize independence with ADLs. Will continue to follow Pt's goals and progress. Pt would benefit from level II resources (moderate intensity) upon DC to maximize safety and independence with ADLs and functional tasks of choice.   Plan   Treatment Interventions ADL retraining;Functional transfer training;Endurance training;UE strengthening/ROM;Equipment evaluation/education;Patient/family training;Cognitive reorientation;Compensatory technique education;Activityengagement;Energy conservation   Goal Expiration Date 01/13/25   OT Treatment Day 1   OT Frequency (S)  3-5x/wk  (Increase to BID PRN TO ASSIST WITH DC PLANNING)   Discharge Recommendation   Rehab Resource Intensity Level, OT II (Moderate Resource Intensity)   Additional Comments  The patient's raw score on the AM-PAC Daily Activity Inpatient Short Form is 19. A raw score of greater than or equal to 19 suggests the patient may benefit from discharge to home. Please refer to the recommendation of the Occupational Therapist for safe discharge planning.   AM-PAC Daily Activity Inpatient   Lower Body Dressing 3   Bathing 3   Toileting 3   Upper Body Dressing 3   Grooming 3   Eating 4   Daily Activity Raw Score 19   Daily Activity Standardized Score (Calc for Raw Score >=11) 40.22   AM-PAC Applied Cognition Inpatient   Following a Speech/Presentation 4   Understanding Ordinary Conversation 4   Taking Medications 4   Remembering Where Things Are Placed or Put Away 4   Remembering List of 4-5 Errands 4   Taking Care of Complicated Tasks 4   Applied Cognition Raw Score 24   Applied Cognition Standardized Score 62.21   End of Consult   Education Provided Yes   Patient Position at End of Consult Bed/Chair alarm activated;All needs within reach;Supine   Nurse Communication Nurse aware of consult       Norma Johnson MS, OTR/L

## 2025-01-07 NOTE — PLAN OF CARE
Problem: PAIN - ADULT  Goal: Verbalizes/displays adequate comfort level or baseline comfort level  Description: Interventions:  - Encourage patient to monitor pain and request assistance  - Assess pain using appropriate pain scale  - Administer analgesics based on type and severity of pain and evaluate response  - Implement non-pharmacological measures as appropriate and evaluate response  - Consider cultural and social influences on pain and pain management  - Notify physician/advanced practitioner if interventions unsuccessful or patient reports new pain  Outcome: Progressing     Problem: INFECTION - ADULT  Goal: Absence or prevention of progression during hospitalization  Description: INTERVENTIONS:  - Assess and monitor for signs and symptoms of infection  - Monitor lab/diagnostic results  - Monitor all insertion sites, i.e. indwelling lines, tubes, and drains  - Monitor endotracheal if appropriate and nasal secretions for changes in amount and color  - [place] appropriate cooling/warming therapies per order  - Administer medications as ordered  - Instruct and encourage patient and family to use good hand hygiene technique  - Identify and instruct in appropriate isolation precautions for identified infection/condition  Outcome: Progressing  Goal: Absence of fever/infection during neutropenic period  Description: INTERVENTIONS:  - Monitor WBC    Outcome: Progressing     Problem: SAFETY ADULT  Goal: Patient will remain free of falls  Description: INTERVENTIONS:  - Educate patient/family on patient safety including physical limitations  - Instruct patient to call for assistance with activity   - Consult OT/PT to assist with strengthening/mobility   - Keep Call bell within reach  - Keep bed low and locked with side rails adjusted as appropriate  - Keep care items and personal belongings within reach  - Initiate and maintain comfort rounds  - Make Fall Risk Sign visible to staff  - Offer Toileting every 2-4  Hours, in advance of need  - Initiate/Maintain bed and chair alarm  - Obtain necessary fall risk management equipment: nonskid socks   - Apply yellow socks and bracelet for high fall risk patients  - Consider moving patient to room near nurses station  Outcome: Progressing  Goal: Maintain or return to baseline ADL function  Description: INTERVENTIONS:  -  Assess patient's ability to carry out ADLs; assess patient's baseline for ADL function and identify physical deficits which impact ability to perform ADLs (bathing, care of mouth/teeth, toileting, grooming, dressing, etc.)  - Assess/evaluate cause of self-care deficits   - Assess range of motion  - Assess patient's mobility; develop plan if impaired  - Assess patient's need for assistive devices and provide as appropriate  - Encourage maximum independence but intervene and supervise when necessary  - Involve family in performance of ADLs  - Assess for home care needs following discharge   - Consider OT consult to assist with ADL evaluation and planning for discharge  - Provide patient education as appropriate  Outcome: Progressing  Goal: Maintains/Returns to pre admission functional level  Description: INTERVENTIONS:  - Perform AM-PAC 6 Click Basic Mobility/ Daily Activity assessment daily.  - Set and communicate daily mobility goal to care team and patient/family/caregiver.   - Collaborate with rehabilitation services on mobility goals if consulted  - Perform Range of Motion 3 times a day.  - Reposition patient every 3 hours.  - Dangle patient 3 times a day  - Stand patient 3 times a day  - Ambulate patient 3 times a day  - Out of bed to chair 3 times a day   - Out of bed for meals 3 times a day  - Out of bed for toileting  - Record patient progress and toleration of activity level   Outcome: Progressing     Problem: DISCHARGE PLANNING  Goal: Discharge to home or other facility with appropriate resources  Description: INTERVENTIONS:  - Identify barriers to  discharge w/patient and caregiver  - Arrange for needed discharge resources and transportation as appropriate  - Identify discharge learning needs (meds, wound care, etc.)  - Arrange for interpretive services to assist at discharge as needed  - Refer to Case Management Department for coordinating discharge planning if the patient needs post-hospital services based on physician/advanced practitioner order or complex needs related to functional status, cognitive ability, or social support system  Outcome: Progressing     Problem: Knowledge Deficit  Goal: Patient/family/caregiver demonstrates understanding of disease process, treatment plan, medications, and discharge instructions  Description: Complete learning assessment and assess knowledge base.  Interventions:  - Provide teaching at level of understanding  - Provide teaching via preferred learning methods  Outcome: Progressing

## 2025-01-07 NOTE — PLAN OF CARE
"  Problem: PHYSICAL THERAPY ADULT  Goal: Performs mobility at highest level of function for planned discharge setting.  See evaluation for individualized goals.  Description: Treatment/Interventions: Functional transfer training, LE strengthening/ROM, Elevations, Therapeutic exercise, Endurance training, Patient/family training, Bed mobility, Gait training, Spoke to nursing, OT  Equipment Recommended: Walker (pt owns)       See flowsheet documentation for full assessment, interventions and recommendations.  1/7/2025 1845 by Ann Marie Ramon PT  Note: Prognosis: Good  Problem List: Decreased strength, Decreased range of motion, Decreased endurance, Impaired balance, Decreased mobility, Pain  Assessment: Patient was seen today per POC. Overall, pt demonstrated improved mobility and inc tolerance to activity. Pt needed minAx1 for supine to sit for LE management. Pt able to perform sit<>stand with RW and S. Patient was able to amb 40'x1 with RW and minAx1 progressing to S throughout session. Gait deviations as mentioned above, pt had minimal buckling during steps, but not during ambulation. Improved gait pattern with no LOB noted. Pt then performed 5 steps with minAx1 and BL rails. Cues needed for LE sequencing and safety on stairs.  No reports of dizziness t/o session. Nsg staff most recent vital signs as follows: /76   Pulse (!) 107   Temp 97.6 °F (36.4 °C) (Oral)   Resp 18   Ht 5' 8\" (1.727 m)   Wt 78.5 kg (173 lb)   SpO2 95%   BMI 26.30 kg/m² . Will continue to see pt per POC as tolerated.  From PT standpoint continued recommendation for level II (moderate intensity) at D/C when medically cleared based on nursing. (PENDING PROGRESS) Nsg staff to continue to mobilized pt (OOB in chair for all meals & ambulate in room/unit) as tolerated to prevent further decline in function. Nsg staff notified.  Barriers to Discharge: Inaccessible home environment, Decreased caregiver support (COLT, is the 's " primary caregiver)  Barriers to Discharge Comments: COLT  Rehab Resource Intensity Level, PT: II (Moderate Resource Intensity) (pending progress)    See flowsheet documentation for full assessment.     1/7/2025 1628 by Ann Marie Ramon PT  Note: Prognosis: Good  Problem List: Decreased strength, Decreased range of motion, Decreased endurance, Impaired balance, Decreased mobility, Pain  Assessment: Patient was seen today per POC. Overall, pt demonstrated fair mobility and inc tolerance to activity. Pain 0/10 in R knee. Pt needed minAx1 for sit<>stand with RW.  Required modAx1 for ambulation with max cues for LE extension during gait. Pt able to ambulate 20'x3 with RW. Antalgic step to  gait with buckling noted on the steps during training. Pt needed modAx1 for 8 steps with rail on L to simulate home set up. Pt needed maxAx2 for buckling noted during second step, max cues continued for proper sequencing and technique with LE during ambulation and standing. Reviewed HEP and provided handout. All questions and concerns addressed at this time. No reports of dizziness t/o session. Will continue to see pt per POC as tolerated. From PT standpoint continued recommendation for level II, (moderate resource intensity) (PENDING PROGRESS) at D/C when medically cleared based current function. Nsg staff to continue to mobilized pt (OOB in chair for all meals & ambulate in room/unit) as tolerated to prevent further decline in function. Nsg staff notified.  Barriers to Discharge: Inaccessible home environment, Decreased caregiver support (caregiver for )  Barriers to Discharge Comments: OCLT  Rehab Resource Intensity Level, PT: II (Moderate Resource Intensity) (pending progress)    See flowsheet documentation for full assessment.

## 2025-01-07 NOTE — PROGRESS NOTES
"Progress Note - Orthopedic Surgery   Anisa Rowe 74 y.o. female MRN: [id number]  Unit/Bed#: [facility] N -01 Encounter: [id number]    Assessment:  74 y.o F w/ right knee OA s/p Right TKA  -VSS      Plan:  -Continue WBAT RLE- PRN pain meds.  -Continue Antibiotics  -HV in place - remove today  -Encourage IS  -Continue DVT ppx- Lovenox, continue x28 days at home    d/c: 1/7 per PT/OT, DCI and d/c meds complete        Subjective: Patient resting comfortably laying in chair, pain controlled. Denies CP/SOB, using IS yet. Passing flatus, denies nausea. Urinating without isssues.     Objective:     Blood pressure 116/60, pulse 70, temperature 98.1 °F (36.7 °C), resp. rate 19, height 5' 8\" (1.727 m), weight 78.5 kg (173 lb), SpO2 97%.,Body mass index is 26.3 kg/m².      Intake/Output Summary (Last 24 hours) at 1/7/2025 0630  Last data filed at 1/7/2025 0501  Gross per 24 hour   Intake 2680 ml   Output 1214 ml   Net 1466 ml       Invasive Devices       Peripheral Intravenous Line  Duration             Peripheral IV 01/06/25 Dorsal (posterior);Left Hand 1 day              Drain  Duration             Urethral Catheter Non-latex 16 Fr. 1764 days    Closed/Suction Drain Anterior;Right;Lateral Knee Accordion 18 Fr. <1 day                    Physical Exam  Vitals and nursing note reviewed.   Constitutional:       General: She is not in acute distress.     Appearance: She is well-developed.   HENT:      Head: Normocephalic and atraumatic.   Eyes:      Conjunctiva/sclera: Conjunctivae normal.   Cardiovascular:      Rate and Rhythm: Normal rate and regular rhythm.      Heart sounds: No murmur heard.  Pulmonary:      Effort: Pulmonary effort is normal. No respiratory distress.      Breath sounds: Normal breath sounds.   Abdominal:      Palpations: Abdomen is soft.      Tenderness: There is no abdominal tenderness.   Musculoskeletal:         General: No swelling.      Cervical back: Neck supple.   Skin:     General: Skin is warm and " dry.      Capillary Refill: Capillary refill takes less than 2 seconds.   Neurological:      Mental Status: She is alert.   Psychiatric:         Mood and Affect: Mood normal.            Scheduled Meds:  Current Facility-Administered Medications   Medication Dose Route Frequency Provider Last Rate    acetaminophen  975 mg Oral Q6H PRN Piedad Ciano, PA-C      calcium carbonate  1,000 mg Oral Daily PRN Piedad Ciano, PA-C      docusate sodium  100 mg Oral BID Piedad Ciano, PA-C      enoxaparin  40 mg Subcutaneous Daily Piedad Ciano, PA-C      gabapentin  100 mg Oral Q8H Piedad Ciano, PA-C      HYDROmorphone  0.5 mg Intravenous Q2H PRN Piedad Ciano, PA-C      lactated ringers  1,000 mL Intravenous Once PRN Piedad Ciano, PA-C      And    lactated ringers  1,000 mL Intravenous Once PRN Piedad Ciano, PA-C      lactated ringers  125 mL/hr Intravenous Continuous Piedad Ciano, PA-C Stopped (01/07/25 0532)    lactated ringers  75 mL/hr Intravenous Continuous Piedad Ciano, PA-C 75 mL/hr (01/06/25 0942)    methocarbamol  500 mg Oral Q6H JAYSHREE Piedad Ciano, PA-C      ondansetron  4 mg Intravenous Q6H PRN Piedad Ciano, PA-C      oxyCODONE  10 mg Oral Q4H PRN Piedad Ciano, PA-C      oxyCODONE  5 mg Oral Q4H PRN Piedad Ciano, PA-C      sodium chloride  1,000 mL Intravenous Once PRN Piedad Ciano, PA-C      And    sodium chloride  1,000 mL Intravenous Once PRN Piedad Ciano, PA-C       Continuous Infusions:lactated ringers, 125 mL/hr, Last Rate: Stopped (01/07/25 0532)  lactated ringers, 75 mL/hr, Last Rate: 75 mL/hr (01/06/25 0942)      PRN Meds:.  acetaminophen    calcium carbonate    HYDROmorphone    lactated ringers **AND** lactated ringers    ondansetron    oxyCODONE    oxyCODONE    sodium chloride **AND** sodium chloride      Lab, Imaging and other studies:I have personally reviewed pertinent lab results.    VTE Pharmacologic Prophylaxis: Sequential compression device (Venodyne)  and Enoxaparin (Lovenox)  VTE Mechanical Prophylaxis: sequential  compression device      Liana Armendariz PA-C  1/7/2025 6:30 AM

## 2025-01-07 NOTE — QUICK NOTE
No new complaints or issues. Resting comfortably in chair. Denies CP, SOB, palpitations, or lightheaded/dizziness. Urinating without issues, passing flatus, denies nausea. Episode of tachycardia when ambulating, other VSS.

## 2025-01-07 NOTE — PLAN OF CARE
Problem: OCCUPATIONAL THERAPY ADULT  Goal: Performs self-care activities at highest level of function for planned discharge setting.  See evaluation for individualized goals.  Description: Treatment Interventions: ADL retraining, Functional transfer training, Endurance training, Patient/family training, Equipment evaluation/education, Compensatory technique education, Continued evaluation, Energy conservation, Activityengagement          See flowsheet documentation for full assessment, interventions and recommendations.   Outcome: Progressing  Note: Limitation: Decreased ADL status, Decreased endurance, Decreased self-care trans, Decreased high-level ADLs  Prognosis: Good  Assessment: Pt greeted up in recliner chair for OT treatment on 01/07/25 focusing on maximizing independence with ADLs. Pt completes functional transfers with min AX1 and functional mobility with min Ax1 with RW. Pt continues to be limited by RLE buckling and poor posture with mobility. Pt requires max cues for safety/technique and progresses to min cues at end of session with fair carryover. Pt also reports she is limited by fatigue this session. Pt min A with toileting routine with use of BSC and S with grooming. Pt returned supine in bed at end of session with min Ax1 with LE management. Pt increased to BID PRN to assist with safe DC planning. Limitations that impact functional performance include decreased ADL status, UE ROM, UE strength, safe judgement during ADLs, cognition, endurance, self care transfers, and high level ADLs. Occupational performance areas to address ADL retraining, functional transfer training, UE strengthening/ROM, endurance training, cognitive reorientation, Pt/caregiver education, equipment evaluation/education, compensatory technique education, energy conservation, and activity engagement . Pt would benefit from continued skilled OT services while in hospital to maximize independence with ADLs. Will continue to  follow Pt's goals and progress. Pt would benefit from level II resources (moderate intensity) upon DC to maximize safety and independence with ADLs and functional tasks of choice.     Rehab Resource Intensity Level, OT: II (Moderate Resource Intensity)

## 2025-01-07 NOTE — ASSESSMENT & PLAN NOTE
-Continue with ice and analgesics/NSAIDs as needed for pain.  -Continue to progress activity as tolerated with PT/OT as ordered.  -Weightbearing as tolerated right lower extremity.  -Hemoglobin stable at this time dressings clean, dry and intact.  Drain discontinued this morning.  -Lovenox 40 mg daily for 28 days total.  For DVT prophylaxis.  -Plan discharge home later today after physical therapy clearance.  -Follow-up outpatient as scheduled with Dr. Ray.

## 2025-01-07 NOTE — PLAN OF CARE
Problem: OCCUPATIONAL THERAPY ADULT  Goal: Performs self-care activities at highest level of function for planned discharge setting.  See evaluation for individualized goals.  Description: Treatment Interventions: ADL retraining, Functional transfer training, Endurance training, Patient/family training, Equipment evaluation/education, Compensatory technique education, Continued evaluation, Energy conservation, Activityengagement          See flowsheet documentation for full assessment, interventions and recommendations.   1/7/2025 1836 by Norma Johnson OT  Outcome: Progressing  Note: Limitation: Decreased ADL status, Decreased endurance, Decreased self-care trans, Decreased high-level ADLs  Prognosis: Good  Assessment: Pt greeted bedside for OT treatment on 01/07/25 focusing on maximizing independence with ADLs. Pt is making good functional progress towards meeting goals. Pt completes bed mobility with min AX1 and performs LB dressing with S seated on EOB. Reviewed compensatory/educational techniques with ADLs. Pt reports understanding. Pt S with functional transfers, and min Ax1 with RW for functional mobility. Pt progressed to S level with functional mobility with RW towards end of session. Limitations that impact functional performance include decreased ADL status, UE ROM, UE strength, safe judgement during ADLs, cognition, endurance, self care transfers, and high level ADLs. Occupational performance areas to address ADL retraining, functional transfer training, UE strengthening/ROM, endurance training, cognitive reorientation, Pt/caregiver education, equipment evaluation/education, compensatory technique education, energy conservation, and activity engagement . Pt would benefit from continued skilled OT services while in hospital to maximize independence with ADLs. Will continue to follow Pt's goals and progress. Pt would benefit from level III resources (minimal intensity) upon DC to maximize safety  and independence with ADLs and functional tasks of choice.     Rehab Resource Intensity Level, OT: III (Minimum Resource Intensity) (HHOT pending PT clearance)       1/7/2025 1547 by Norma Johnson OT  Outcome: Progressing  Note: Limitation: Decreased ADL status, Decreased endurance, Decreased self-care trans, Decreased high-level ADLs  Prognosis: Good  Assessment: Pt greeted up in recliner chair for OT treatment on 01/07/25 focusing on maximizing independence with ADLs. Pt completes functional transfers with min AX1 and functional mobility with min Ax1 with RW. Pt continues to be limited by RLE buckling and poor posture with mobility. Pt requires max cues for safety/technique and progresses to min cues at end of session with fair carryover. Pt also reports she is limited by fatigue this session. Pt min A with toileting routine with use of BSC and S with grooming. Pt returned supine in bed at end of session with min Ax1 with LE management. Pt increased to BID PRN to assist with safe DC planning. Limitations that impact functional performance include decreased ADL status, UE ROM, UE strength, safe judgement during ADLs, cognition, endurance, self care transfers, and high level ADLs. Occupational performance areas to address ADL retraining, functional transfer training, UE strengthening/ROM, endurance training, cognitive reorientation, Pt/caregiver education, equipment evaluation/education, compensatory technique education, energy conservation, and activity engagement . Pt would benefit from continued skilled OT services while in hospital to maximize independence with ADLs. Will continue to follow Pt's goals and progress. Pt would benefit from level II resources (moderate intensity) upon DC to maximize safety and independence with ADLs and functional tasks of choice.     Rehab Resource Intensity Level, OT: II (Moderate Resource Intensity)

## 2025-01-07 NOTE — OCCUPATIONAL THERAPY NOTE
Occupational Therapy Progress Note     Patient Name: Anisa Rowe  Today's Date: 1/7/2025  Problem List  Principal Problem:    Primary osteoarthritis of right knee  Active Problems:    Mixed hyperlipidemia            01/07/25 1814   OT Last Visit   OT Visit Date 01/07/25   Note Type   Note Type Treatment   Pain Assessment   Pain Assessment Tool 0-10   Pain Score 5   Pain Location/Orientation Orientation: Right;Location: Knee   Hospital Pain Intervention(s) Repositioned;Ambulation/increased activity   Restrictions/Precautions   Weight Bearing Precautions Per Order Yes   RLE Weight Bearing Per Order WBAT   Other Precautions Chair Alarm;Bed Alarm;Fall Risk;Pain;WBS   Lifestyle   Autonomy Independent with all ADLs/IADLs, no AD use at baseline   Reciprocal Relationships Family/friends   Service to Others Retired   Intrinsic Gratification gardening   ADL   Where Assessed Edge of bed   UB Dressing Comments Pt reports understanding of ADL compensatory techniques at home and with support.   LB Dressing Assistance 5  Supervision/Setup   LB Dressing Deficit Setup;Thread RLE into underwear;Thread LLE into underwear   LB Dressing Comments Pt educated on use of LHAE. Pt with G carryover of technique and with supportive family/friends at home to assist.   Bed Mobility   Supine to Sit 4  Minimal assistance   Additional items Assist x 1;Increased time required;LE management   Sit to Supine Unable to assess   Additional Comments Pt greeted supine in bed.   Transfers   Sit to Stand 5  Supervision   Additional items Increased time required;Verbal cues   Stand to Sit 5  Supervision   Additional items Increased time required;Verbal cues   Additional Comments with RW   Functional Mobility   Functional Mobility 4  Minimal assistance   Additional Comments MIn AX1 with RW and progress to S towards end of session.   Additional items Rolling walker   Cognition   Overall Cognitive Status WFL   Arousal/Participation Alert;Cooperative    Attention Within functional limits   Orientation Level Oriented X4   Memory Within functional limits   Following Commands Follows all commands and directions without difficulty   Comments Pt pleasant and cooperative during OT session.   Activity Tolerance   Activity Tolerance Patient limited by fatigue;Patient limited by pain   Medical Staff Made Aware Kayce VILLAGOMEZ cleared/updated. Portions of tx completed with LULA Castaneda 2* to Pt's medical complexity and decreased endurance. OT focus on maximizing independence with ADLs.   Assessment   Assessment Pt greeted bedside for OT treatment on 01/07/25 focusing on maximizing independence with ADLs. Pt is making good functional progress towards meeting goals. Pt completes bed mobility with min AX1 and performs LB dressing with S seated on EOB. Reviewed compensatory/educational techniques with ADLs. Pt reports understanding. Pt S with functional transfers, and min Ax1 with RW for functional mobility. Pt progressed to S level with functional mobility with RW towards end of session. Limitations that impact functional performance include decreased ADL status, UE ROM, UE strength, safe judgement during ADLs, cognition, endurance, self care transfers, and high level ADLs. Occupational performance areas to address ADL retraining, functional transfer training, UE strengthening/ROM, endurance training, cognitive reorientation, Pt/caregiver education, equipment evaluation/education, compensatory technique education, energy conservation, and activity engagement . Pt would benefit from continued skilled OT services while in hospital to maximize independence with ADLs. Will continue to follow Pt's goals and progress. Pt would benefit from level III resources (minimal intensity) upon DC to maximize safety and independence with ADLs and functional tasks of choice.   Plan   Treatment Interventions Functional transfer training;ADL retraining;Cognitive reorientation;UE  strengthening/ROM;Endurance training;Patient/family training;Compensatory technique education;Energy conservation;Activityengagement   Goal Expiration Date 01/13/25   OT Treatment Day 2   OT Frequency (S)  3-5x/wk  (Pt progressing towards meeting goals, decrease POC back to 3-5x)   Discharge Recommendation   Rehab Resource Intensity Level, OT III (Minimum Resource Intensity)  (HHOT pending PT clearance)   Additional Comments  The patient's raw score on the -PAC Daily Activity Inpatient Short Form is 19. A raw score of greater than or equal to 19 suggests the patient may benefit from discharge to home. Please refer to the recommendation of the Occupational Therapist for safe discharge planning.   AM-PAC Daily Activity Inpatient   Lower Body Dressing 3   Bathing 3   Toileting 3   Upper Body Dressing 3   Grooming 3   Eating 4   Daily Activity Raw Score 19   Daily Activity Standardized Score (Calc for Raw Score >=11) 40.22   AM-PAC Applied Cognition Inpatient   Following a Speech/Presentation 4   Understanding Ordinary Conversation 4   Taking Medications 4   Remembering Where Things Are Placed or Put Away 4   Remembering List of 4-5 Errands 4   Taking Care of Complicated Tasks 4   Applied Cognition Raw Score 24   Applied Cognition Standardized Score 62.21   End of Consult   Education Provided Yes   Patient Position at End of Consult Bed/Chair alarm activated;All needs within reach;Bedside chair   Nurse Communication Nurse aware of consult       Norma Johnson MS, OTR/L

## 2025-01-07 NOTE — PHYSICAL THERAPY NOTE
"PT PROGRESS NOTE    Name: Anisa Rowe  AGE: 74 y.o.  MRN: [id number]  LENGTH OF STAY: 0        01/07/25 1738   PT Last Visit   PT Visit Date 01/07/25   Note Type   Note Type Treatment   Pain Assessment   Pain Assessment Tool 0-10   Pain Score 5   Pain Location/Orientation Orientation: Right;Location: Knee   Hospital Pain Intervention(s) Repositioned;Ambulation/increased activity;Elevated;Emotional support;Rest   Restrictions/Precautions   Weight Bearing Precautions Per Order Yes   RLE Weight Bearing Per Order WBAT   Other Precautions Chair Alarm;Bed Alarm;Fall Risk;Pain;WBS   General   Chart Reviewed Yes   Response to Previous Treatment Patient with no complaints from previous session.   Family/Caregiver Present Yes   Cognition   Overall Cognitive Status WFL   Arousal/Participation Alert;Cooperative   Attention Within functional limits   Orientation Level Oriented X4   Following Commands Follows all commands and directions without difficulty   Comments pt pleasant   Subjective   Subjective \"I am feeling better\"   Bed Mobility   Supine to Sit 4  Minimal assistance   Additional items Assist x 1;Increased time required;Verbal cues;LE management   Sit to Supine Unable to assess   Additional Comments pt greeted in supine   Transfers   Sit to Stand 5  Supervision   Additional items Increased time required;Verbal cues  (RW)   Stand to Sit 5  Supervision   Additional items Increased time required;Verbal cues  (RW)   Additional Comments cues for RW safety   Ambulation/Elevation   Gait pattern Improper Weight shift;Antalgic;Decreased R stance;Step to;Excessively slow;Decreased toe off;Decreased heel strike;Decreased hip extension   Gait Assistance 4  Minimal assist   Additional items Assist x 1;Verbal cues  (progressing to S)   Assistive Device Rolling walker   Distance 40'x1   Stair Management Assistance 4  Minimal assist   Additional items Assist x 1;Increased time required;Verbal cues   Stair Management Technique Two " "rails;Step to pattern;Foreward   Number of Stairs 5   Ambulation/Elevation Additional Comments less buckling noted during steps. Max cues needed for safety and LE sequencing   Balance   Static Sitting Good   Dynamic Sitting Fair +   Static Standing Fair   Dynamic Standing Fair -   Ambulatory Fair -   Endurance Deficit   Endurance Deficit Yes   Endurance Deficit Description fatigue and pain   Activity Tolerance   Activity Tolerance Patient tolerated treatment well;Patient limited by fatigue   Medical Staff Made Aware HERNANDO Elder   Nurse Made Aware yes   Assessment   Prognosis Good   Problem List Decreased strength;Decreased range of motion;Decreased endurance;Impaired balance;Decreased mobility;Pain   Assessment Patient was seen today per POC. Overall, pt demonstrated improved mobility and inc tolerance to activity. Pt needed minAx1 for supine to sit for LE management. Pt able to perform sit<>stand with RW and S. Patient was able to amb 40'x1 with RW and minAx1 progressing to S throughout session. Gait deviations as mentioned above, pt had minimal buckling during steps, but not during ambulation. Improved gait pattern with no LOB noted. Pt then performed 5 steps with minAx1 and BL rails. Cues needed for LE sequencing and safety on stairs.  No reports of dizziness t/o session. Nsg staff most recent vital signs as follows: /76   Pulse (!) 107   Temp 97.6 °F (36.4 °C) (Oral)   Resp 18   Ht 5' 8\" (1.727 m)   Wt 78.5 kg (173 lb)   SpO2 95%   BMI 26.30 kg/m² . Will continue to see pt per POC as tolerated.  From PT standpoint continued recommendation for level II (moderate intensity) at D/C when medically cleared based on nursing. (PENDING PROGRESS) Nsg staff to continue to mobilized pt (OOB in chair for all meals & ambulate in room/unit) as tolerated to prevent further decline in function. Nsg staff notified.   Barriers to Discharge Inaccessible home environment;Decreased caregiver support  (COLT, is the " 's primary caregiver)   Goals   Patient Goals to go home   STG Expiration Date 01/13/25   Short Term Goal #1 1) Inc overall LE strength by 1/2 MMT grade to improve functional mobility; 2) Pt will demonstrate improved bed mobility with mod I to dec caregiver burden; 3) Pt will demonstrate improved transfers w/ mod I for inc safety; 4) Pt will be able to amb w/ mod I >150' w/ RW for household distances to inc safety and dec caregiver burden; 5) Pt will be able to navigate stairs with mod I to dec caregiver burden and inc safety with functional mobility; 6) Improve general balance by 1 grade to inc safety; 7) PT for ongoing patient and caregiver education   PT Treatment Day 2   Plan   Treatment/Interventions Functional transfer training;LE strengthening/ROM;Elevations;Therapeutic exercise;Endurance training;Patient/family training;Bed mobility;Gait training;Spoke to nursing;OT;Family   Progress Progressing toward goals   PT Frequency Twice a day  (prn)   Discharge Recommendation   Rehab Resource Intensity Level, PT II (Moderate Resource Intensity)  (pending progress)   Equipment Recommended Walker   Additional Comments The patient's AM-PAC Basic Mobility Inpatient Short Form Raw Score is 19. A Raw score of greater than 16 suggests the patient may benefit from discharge to home. Please also refer to the recommendation of the Physical Therapist for safe discharge planning.   AM-PAC Basic Mobility Inpatient   Turning in Flat Bed Without Bedrails 3   Lying on Back to Sitting on Edge of Flat Bed Without Bedrails 3   Moving Bed to Chair 4   Standing Up From Chair Using Arms 3   Walk in Room 3   Climb 3-5 Stairs With Railing 3   Basic Mobility Inpatient Raw Score 19   Basic Mobility Standardized Score 42.48   [facility] Highest Level Of Mobility   -HLM Goal 6: Walk 10 steps or more   -HLM Achieved 7: Walk 25 feet or more   Education   Education Provided Mobility training;Home exercise program;Assistive device    Patient Demonstrates acceptance/verbal understanding   End of Consult   Patient Position at End of Consult All needs within reach;Bed/Chair alarm activated;Bedside chair   End of Consult Comments pt stable, left in recliner chair with alarm on. RN updated     Ann Marie Ramon, PT

## 2025-01-07 NOTE — PHYSICAL THERAPY NOTE
"   PT PROGRESS NOTE    Name: Anisa Rowe  AGE: 74 y.o.  MRN: [id number]  LENGTH OF STAY: 0        01/07/25 1420   PT Last Visit   PT Visit Date 01/07/25   Note Type   Note Type Treatment   Pain Assessment   Pain Assessment Tool 0-10   Pain Score No Pain   Pain Location/Orientation Orientation: Right;Location: Knee   Hospital Pain Intervention(s) Repositioned;Ambulation/increased activity;Elevated;Emotional support;Rest   Restrictions/Precautions   Weight Bearing Precautions Per Order Yes   RLE Weight Bearing Per Order WBAT   Other Precautions Chair Alarm;Bed Alarm;WBS;Fall Risk;Pain   General   Chart Reviewed Yes   Response to Previous Treatment Patient with no complaints from previous session.   Family/Caregiver Present Yes   Cognition   Overall Cognitive Status WFL   Arousal/Participation Alert;Cooperative   Attention Within functional limits   Orientation Level Oriented X4   Following Commands Follows all commands and directions without difficulty   Comments pt pleasant and motivated   Subjective   Subjective \"I wanna try the steps\"   Bed Mobility   Supine to Sit Unable to assess   Sit to Supine 4  Minimal assistance   Additional items Assist x 1;Increased time required;LE management;Verbal cues   Additional Comments pt greeted in recliner chair   Transfers   Sit to Stand 3  Moderate assistance   Additional items Assist x 1;Verbal cues;Increased time required  (RW)   Stand to Sit 3  Moderate assistance   Additional items Assist x 2;Increased time required;Verbal cues  (RW)   Toilet transfer 4  Minimal assistance   Additional items Assist x 1;Increased time required;Verbal cues  (RW)   Additional Comments max cues for safety and quad. contraction in R LE to promote LE extension during ambulation   Ambulation/Elevation   Gait pattern R Knee Vianney;Improper Weight shift;Antalgic;Decreased R stance;Step to;Excessively slow;Decreased toe off;Decreased heel strike;Decreased hip extension   Gait Assistance 3  Moderate " assist   Additional items Assist x 1;Verbal cues   Assistive Device Rolling walker   Distance 20'x3   Stair Management Assistance 3  Moderate assist   Additional items Assist x 1;Increased time required;Verbal cues   Stair Management Technique One rail L;Step to pattern;Foreward  (HHA in R UE to simulate home)   Number of Stairs 8   Ambulation/Elevation Additional Comments (S)  buckling noted in R LE during second step, maxAx2 needed for safety. Pt able to continue stair training with modAx1 and max cues for LE sequencing and LE extension in R LE   Balance   Static Sitting Fair +   Dynamic Sitting Fair   Static Standing Fair -   Dynamic Standing Poor +   Ambulatory Poor +   Endurance Deficit   Endurance Deficit Yes   Endurance Deficit Description fatigue, tired and pain   Activity Tolerance   Activity Tolerance Other (Comment);Patient limited by pain;Patient limited by fatigue  (knee buckling)   Medical Staff Made Aware RAE Green RN C   Nurse Made Aware yes   Assessment   Prognosis Good   Problem List Decreased strength;Decreased range of motion;Decreased endurance;Impaired balance;Decreased mobility;Pain   Assessment Patient was seen today per POC. Overall, pt demonstrated fair mobility and inc tolerance to activity. Pain 0/10 in R knee. Pt needed minAx1 for sit<>stand with RW.  Required modAx1 for ambulation with max cues for LE extension during gait. Pt able to ambulate 20'x3 with RW. Antalgic step to  gait with buckling noted on the steps during training. Pt needed modAx1 for 8 steps with rail on L to simulate home set up. Pt needed maxAx2 for buckling noted during second step, max cues continued for proper sequencing and technique with LE during ambulation and standing. Reviewed HEP and provided handout. All questions and concerns addressed at this time. No reports of dizziness t/o session. Will continue to see pt per POC as tolerated. From PT standpoint continued recommendation for level II, (moderate  resource intensity) (PENDING PROGRESS) at D/C when medically cleared based current function. Ns staff to continue to mobilized pt (OOB in chair for all meals & ambulate in room/unit) as tolerated to prevent further decline in function. [facility] staff notified.   Barriers to Discharge Inaccessible home environment;Decreased caregiver support  (caregiver for )   Barriers to Discharge Comments COLT   Goals   Patient Goals to go home   STG Expiration Date 01/13/25   Short Term Goal #1 1) Inc overall LE strength by 1/2 MMT grade to improve functional mobility; 2) Pt will demonstrate improved bed mobility with mod I to dec caregiver burden; 3) Pt will demonstrate improved transfers w/ mod I for inc safety; 4) Pt will be able to amb w/ mod I >150' w/ RW for household distances to inc safety and dec caregiver burden; 5) Pt will be able to navigate stairs with mod I to dec caregiver burden and inc safety with functional mobility; 6) Improve general balance by 1 grade to inc safety; 7) PT for ongoing patient and caregiver education   PT Treatment Day 1   Plan   Treatment/Interventions Functional transfer training;LE strengthening/ROM;Elevations;Therapeutic exercise;Endurance training;Patient/family training;Bed mobility;Gait training;Spoke to nursing;OT;Family   Progress Slow progress, decreased activity tolerance  (knee buckling)   PT Frequency Twice a day  (prn)   Discharge Recommendation   Rehab Resource Intensity Level, PT II (Moderate Resource Intensity)  (pending progress)   Equipment Recommended Walker  (pt owns)   Additional Comments The patient's AM-PAC Basic Mobility Inpatient Short Form Raw Score is 17. A Raw score of greater than 16 suggests the patient may benefit from discharge to home. Please also refer to the recommendation of the Physical Therapist for safe discharge planning.   AM-PAC Basic Mobility Inpatient   Turning in Flat Bed Without Bedrails 3   Lying on Back to Sitting on Edge of Flat Bed Without  Bedrails 3   Moving Bed to Chair 3   Standing Up From Chair Using Arms 3   Walk in Room 3   Climb 3-5 Stairs With Railing 2   Basic Mobility Inpatient Raw Score 17   Basic Mobility Standardized Score 39.67   [facility] Highest Level Of Mobility   -HL Goal 5: Stand one or more mins   -HLM Achieved 6: Walk 10 steps or more   Education   Education Provided Home exercise program;Mobility training;Assistive device   Patient Demonstrates acceptance/verbal understanding   End of Consult   Patient Position at End of Consult Bed/Chair alarm activated;All needs within reach;Supine   End of Consult Comments pt stable, left in supine in with call bell and alarm on. RN updated. Friend in room     Ann Marie Ramon, PT

## 2025-01-07 NOTE — PLAN OF CARE
Problem: PAIN - ADULT  Goal: Verbalizes/displays adequate comfort level or baseline comfort level  Description: Interventions:  - Encourage patient to monitor pain and request assistance  - Assess pain using appropriate pain scale  - Administer analgesics based on type and severity of pain and evaluate response  - Implement non-pharmacological measures as appropriate and evaluate response  - Consider cultural and social influences on pain and pain management  - Notify physician/advanced practitioner if interventions unsuccessful or patient reports new pain  Outcome: Progressing     Problem: INFECTION - ADULT  Goal: Absence or prevention of progression during hospitalization  Description: INTERVENTIONS:  - Assess and monitor for signs and symptoms of infection  - Monitor lab/diagnostic results  - Monitor all insertion sites, i.e. indwelling lines, tubes, and drains  - Monitor endotracheal if appropriate and nasal secretions for changes in amount and color  - [place] appropriate cooling/warming therapies per order  - Administer medications as ordered  - Instruct and encourage patient and family to use good hand hygiene technique  - Identify and instruct in appropriate isolation precautions for identified infection/condition  Outcome: Progressing  Goal: Absence of fever/infection during neutropenic period  Description: INTERVENTIONS:  - Monitor WBC    Outcome: Progressing     Problem: SAFETY ADULT  Goal: Patient will remain free of falls  Description: INTERVENTIONS:  - Educate patient/family on patient safety including physical limitations  - Instruct patient to call for assistance with activity   - Consult OT/PT to assist with strengthening/mobility   - Keep Call bell within reach  - Keep bed low and locked with side rails adjusted as appropriate  - Keep care items and personal belongings within reach  - Initiate and maintain comfort rounds  - Make Fall Risk Sign visible to staff  - Offer Toileting every 2 Hours,  in advance of need  - Initiate/Maintain bed/chair alarm  - Obtain necessary fall risk management equipment: rolling walker  - Apply yellow socks and bracelet for high fall risk patients  - Consider moving patient to room near nurses station  Outcome: Progressing  Goal: Maintain or return to baseline ADL function  Description: INTERVENTIONS:  -  Assess patient's ability to carry out ADLs; assess patient's baseline for ADL function and identify physical deficits which impact ability to perform ADLs (bathing, care of mouth/teeth, toileting, grooming, dressing, etc.)  - Assess/evaluate cause of self-care deficits   - Assess range of motion  - Assess patient's mobility; develop plan if impaired  - Assess patient's need for assistive devices and provide as appropriate  - Encourage maximum independence but intervene and supervise when necessary  - Involve family in performance of ADLs  - Assess for home care needs following discharge   - Consider OT consult to assist with ADL evaluation and planning for discharge  - Provide patient education as appropriate  Outcome: Progressing  Goal: Maintains/Returns to pre admission functional level  Description: INTERVENTIONS:  - Perform AM-PAC 6 Click Basic Mobility/ Daily Activity assessment daily.  - Set and communicate daily mobility goal to care team and patient/family/caregiver.   - Collaborate with rehabilitation services on mobility goals if consulted  - Perform Range of Motion 3 times a day.  - Reposition patient every 2 hours.  - Dangle patient 3 times a day  - Stand patient 3 times a day  - Ambulate patient 3 times a day  - Out of bed to chair 3 times a day   - Out of bed for meals 3 times a day  - Out of bed for toileting  - Record patient progress and toleration of activity level   Outcome: Progressing     Problem: DISCHARGE PLANNING  Goal: Discharge to home or other facility with appropriate resources  Description: INTERVENTIONS:  - Identify barriers to discharge  w/patient and caregiver  - Arrange for needed discharge resources and transportation as appropriate  - Identify discharge learning needs (meds, wound care, etc.)  - Arrange for interpretive services to assist at discharge as needed  - Refer to Case Management Department for coordinating discharge planning if the patient needs post-hospital services based on physician/advanced practitioner order or complex needs related to functional status, cognitive ability, or social support system  Outcome: Progressing     Problem: Knowledge Deficit  Goal: Patient/family/caregiver demonstrates understanding of disease process, treatment plan, medications, and discharge instructions  Description: Complete learning assessment and assess knowledge base.  Interventions:  - Provide teaching at level of understanding  - Provide teaching via preferred learning methods  Outcome: Progressing

## 2025-01-07 NOTE — TELEPHONE ENCOUNTER
Caller: [facility]omo[facility]    Doctor: Flor    Reason for call: Pari Palomo is requesting the home health order as well as the OVN from the last visit. Please fax to [phone]. Pari stated they are excepting the patient they just will need those two documents.     Call back#: [phone]

## 2025-01-07 NOTE — PROGRESS NOTES
"Progress Note - Orthopedics   Name: Anisa Rowe 74 y.o. female I MRN: [id number]  Unit/Bed#: WE 2 N -01 I Date of Admission: 1/6/2025   Date of Service: 1/7/2025 I Hospital Day: 0     Assessment & Plan  Primary osteoarthritis of right knee  -Continue with ice and analgesics/NSAIDs as needed for pain.  -Continue to progress activity as tolerated with PT/OT as ordered.  -Weightbearing as tolerated right lower extremity.  -Hemoglobin stable at this time dressings clean, dry and intact.  Drain discontinued this morning.  -Lovenox 40 mg daily for 28 days total.  For DVT prophylaxis.  -Plan discharge home later today after physical therapy clearance.  -Follow-up outpatient as scheduled with Dr. Ray.  Mixed hyperlipidemia      Ok for discharge from Orthopedics service perspective.    Subjective   74 y.o.female postop day 1 status post right total knee replacement.  No acute events, no new complaints. Pain well controlled at this time. Denies fevers, chills, CP, SOB, N/V, numbness or tingling. Patient reports no issues with urination or bowel movements. Patient states plan is for discharge home when ready with home physical therapy.    Objective :  Temp:  [97.4 °F (36.3 °C)-98.1 °F (36.7 °C)] 98.1 °F (36.7 °C)  HR:  [] 70  BP: (113-157)/(58-84) 116/60  Resp:  [18-22] 19  SpO2:  [94 %-98 %] 97 %  O2 Device: None (Room air)    Physical Exam  Musculoskeletal: rightupper  No erythema or ecchymosis.  Dressing clean, dry and intact.  Drain discontinued this morning.  TTP jason-incisional area  Motor intact to +FHL/EHL, +ankle dorsi/plantar flexion  Sensation intact to saphenous, sural, tibial, superficial peroneal nerve, and deep peroneal  2+ DP pulse  No calf swelling or tenderness to palpation      Lab Results: I have reviewed the following results:  Recent Labs     01/07/25  0517   WBC 11.45*   HGB 12.4   HCT 36.5      BUN 14   CREATININE 0.57*     Blood Culture:  No results found for: \"BLOODCX\"  Wound " "Culture: No results found for: \"WOUNDCULT\"  "

## 2025-01-07 NOTE — CASE MANAGEMENT
Case Management Discharge Planning Note    Patient name Anisa Rowe  Location 2 N /WE 2 N * MRN [id number]  : 1950 Date 2025       Current Admission Date: 2025  Current Admission Diagnosis:Primary osteoarthritis of right knee   Patient Active Problem List    Diagnosis Date Noted Date Diagnosed    Primary osteoarthritis of right knee 2024     Primary osteoarthritis of one hip, left 2024     Pessary maintenance 10/29/2021     Incomplete uterovaginal prolapse 2020     Cystocele, midline 2020     Pelvic muscle wasting 2020     Other female genital prolapse 2020     Female bladder prolapse 01/10/2020     Mixed hyperlipidemia 01/10/2020     Primary insomnia 01/10/2020     Osteopenia 01/10/2020     Overweight (BMI 25.0-29.9) 01/10/2020       LOS (days): 0  Geometric Mean LOS (GMLOS) (days):   Days to GMLOS:     OBJECTIVE:            Current admission status: Outpatient Surgery   Preferred Pharmacy:   [facility]/pharmacy #[id number] - [place] - [address]  [address]  [place] [address]  Phone: [phone] Fax: [phone]    Primary Care Provider: Samir Pradhan DO    Primary Insurance: [employer] REP  Secondary Insurance:     DISCHARGE DETAILS:    Requested Home Health Care         Is the patient interested in HHC at discharge?: Yes  Home Health Discipline requested:: Physical Therapy, Occupational Therapy  [facility]  Home Health Follow-Up Provider:: Referring Provider  Home Health Services Needed:: Strengthening/Theraputic Exercises to Improve Function, Restore Joint Function Post Joint Replacement, Post-Op Care and Assessment, Gait/ADL Training, Evaluate Functional Status and Safety  Homebound Criteria Met:: Requires the Assistance of Another Person for Safe Ambulation or to Leave the Home, Uses an Assist Device (i.e. cane, walker, etc)  Supporting Clincal Findings:: Limited Endurance, Fatigues Easliy in Short  Distances      Treatment Team Recommendation: Home with Home Health Care  Discharge Destination Plan:: Home with Home Health Care    Additional Comments: PT/OT recommended home with PT/OT services.  CM s/w the pt, she used [facility] in the past prefers to use them again.  Referral for [facility] made.  [facility] accepted and reserved in [facility].  Pt has no oher discharge needs at this time.

## 2025-01-07 NOTE — PLAN OF CARE
Problem: PHYSICAL THERAPY ADULT  Goal: Performs mobility at highest level of function for planned discharge setting.  See evaluation for individualized goals.  Description: Treatment/Interventions: Functional transfer training, LE strengthening/ROM, Elevations, Therapeutic exercise, Endurance training, Patient/family training, Bed mobility, Gait training, Spoke to nursing, OT  Equipment Recommended: Walker (pt owns)       See flowsheet documentation for full assessment, interventions and recommendations.  Note: Prognosis: Good  Problem List: Decreased strength, Decreased range of motion, Decreased endurance, Impaired balance, Decreased mobility, Pain  Assessment: Patient was seen today per POC. Overall, pt demonstrated fair mobility and inc tolerance to activity. Pain 0/10 in R knee. Pt needed minAx1 for sit<>stand with RW.  Required modAx1 for ambulation with max cues for LE extension during gait. Pt able to ambulate 20'x3 with RW. Antalgic step to  gait with buckling noted on the steps during training. Pt needed modAx1 for 8 steps with rail on L to simulate home set up. Pt needed maxAx2 for buckling noted during second step, max cues continued for proper sequencing and technique with LE during ambulation and standing. Reviewed HEP and provided handout. All questions and concerns addressed at this time. No reports of dizziness t/o session. Will continue to see pt per POC as tolerated. From PT standpoint continued recommendation for level II, (moderate resource intensity) (PENDING PROGRESS) at D/C when medically cleared based current function. Nsg staff to continue to mobilized pt (OOB in chair for all meals & ambulate in room/unit) as tolerated to prevent further decline in function. Nsg staff notified.  Barriers to Discharge: Inaccessible home environment, Decreased caregiver support (caregiver for )  Barriers to Discharge Comments: COLT  Rehab Resource Intensity Level, PT: II (Moderate Resource Intensity)  (pending progress)    See flowsheet documentation for full assessment.

## 2025-01-08 VITALS
WEIGHT: 173 LBS | DIASTOLIC BLOOD PRESSURE: 68 MMHG | SYSTOLIC BLOOD PRESSURE: 111 MMHG | BODY MASS INDEX: 26.22 KG/M2 | TEMPERATURE: 97.6 F | HEIGHT: 68 IN | RESPIRATION RATE: 18 BRPM | HEART RATE: 104 BPM | OXYGEN SATURATION: 96 %

## 2025-01-08 LAB
ALBUMIN SERPL BCG-MCNC: 4.2 G/DL (ref 3.5–5)
ALP SERPL-CCNC: 69 U/L (ref 34–104)
ALT SERPL W P-5'-P-CCNC: 13 U/L (ref 7–52)
ANION GAP SERPL CALCULATED.3IONS-SCNC: 7 MMOL/L (ref 4–13)
AST SERPL W P-5'-P-CCNC: 15 U/L (ref 13–39)
BILIRUB SERPL-MCNC: 1.53 MG/DL (ref 0.2–1)
BUN SERPL-MCNC: 14 MG/DL (ref 5–25)
CALCIUM SERPL-MCNC: 9.2 MG/DL (ref 8.4–10.2)
CARDIAC TROPONIN I PNL SERPL HS: 3 NG/L (ref 8–18)
CHLORIDE SERPL-SCNC: 103 MMOL/L (ref 96–108)
CO2 SERPL-SCNC: 28 MMOL/L (ref 21–32)
CREAT SERPL-MCNC: 0.65 MG/DL (ref 0.6–1.3)
ERYTHROCYTE [DISTWIDTH] IN BLOOD BY AUTOMATED COUNT: 12.8 % (ref 11.6–15.1)
GFR SERPL CREATININE-BSD FRML MDRD: 87 ML/MIN/1.73SQ M
GLUCOSE SERPL-MCNC: 98 MG/DL (ref 65–140)
HCT VFR BLD AUTO: 40.3 % (ref 34.8–46.1)
HGB BLD-MCNC: 13.5 G/DL (ref 11.5–15.4)
MCH RBC QN AUTO: 30.2 PG (ref 26.8–34.3)
MCHC RBC AUTO-ENTMCNC: 33.5 G/DL (ref 31.4–37.4)
MCV RBC AUTO: 90 FL (ref 82–98)
PLATELET # BLD AUTO: 211 THOUSANDS/UL (ref 149–390)
PMV BLD AUTO: 9 FL (ref 8.9–12.7)
POTASSIUM SERPL-SCNC: 3.8 MMOL/L (ref 3.5–5.3)
PROT SERPL-MCNC: 7.1 G/DL (ref 6.4–8.4)
RBC # BLD AUTO: 4.47 MILLION/UL (ref 3.81–5.12)
SODIUM SERPL-SCNC: 138 MMOL/L (ref 135–147)
WBC # BLD AUTO: 8.35 THOUSAND/UL (ref 4.31–10.16)

## 2025-01-08 PROCEDURE — 97110 THERAPEUTIC EXERCISES: CPT

## 2025-01-08 PROCEDURE — 99024 POSTOP FOLLOW-UP VISIT: CPT

## 2025-01-08 PROCEDURE — 80053 COMPREHEN METABOLIC PANEL: CPT

## 2025-01-08 PROCEDURE — 85027 COMPLETE CBC AUTOMATED: CPT

## 2025-01-08 PROCEDURE — 97116 GAIT TRAINING THERAPY: CPT

## 2025-01-08 PROCEDURE — 84484 ASSAY OF TROPONIN QUANT: CPT | Performed by: INTERNAL MEDICINE

## 2025-01-08 PROCEDURE — 99232 SBSQ HOSP IP/OBS MODERATE 35: CPT | Performed by: INTERNAL MEDICINE

## 2025-01-08 RX ORDER — HYDROCODONE BITARTRATE AND ACETAMINOPHEN 5; 325 MG/1; MG/1
1 TABLET ORAL EVERY 6 HOURS PRN
Qty: 30 TABLET | Refills: 0 | Status: SHIPPED | OUTPATIENT
Start: 2025-01-08 | End: 2025-01-18

## 2025-01-08 RX ADMIN — GABAPENTIN 100 MG: 100 CAPSULE ORAL at 12:28

## 2025-01-08 RX ADMIN — ACETAMINOPHEN 325MG 650 MG: 325 TABLET ORAL at 05:38

## 2025-01-08 RX ADMIN — METHOCARBAMOL TABLETS 500 MG: 500 TABLET, COATED ORAL at 05:20

## 2025-01-08 RX ADMIN — ENOXAPARIN SODIUM 40 MG: 40 INJECTION SUBCUTANEOUS at 09:09

## 2025-01-08 RX ADMIN — METHOCARBAMOL TABLETS 500 MG: 500 TABLET, COATED ORAL at 12:28

## 2025-01-08 RX ADMIN — DOCUSATE SODIUM 100 MG: 100 CAPSULE, LIQUID FILLED ORAL at 09:09

## 2025-01-08 RX ADMIN — HYDROCODONE BITARTRATE AND ACETAMINOPHEN 1 TABLET: 5; 325 TABLET ORAL at 09:09

## 2025-01-08 RX ADMIN — GABAPENTIN 100 MG: 100 CAPSULE ORAL at 04:48

## 2025-01-08 NOTE — UTILIZATION REVIEW
Initial Clinical Review    Elective  surgical procedure  Age/Sex: 74 y.o. female  Surgery Date: 1/6  Procedure: Right - robotically assisted right total knee arthroplasty. all associated procedures as indicated   Anesthesia: choice  Operative Findings:     POD#1 Progress Note: s/p Right TKA Continue WBAT RLE- PRN pain meds.Continue Antibiotics  -HV in place - remove today. Encourage IS  Continue DVT ppx- Lovenox, continue x28 days at home.  d/c: 1/7 per PT/OT, DCI and d/c meds complete    Date: 1/8  Day 3: Has surpassed a 2nd midnight with active treatments and services. Postoperative day 2 status post right TKA, patient doing quite well, pain well-controlled, AVSS, overnight appeared to experience asymptomatic sinus tachycardia which was self-limiting and resolved, hemoglobin stable, neurovascular intact of the right lower extremity . EKG reviewed, self-limited and tachycardia resolved overnight into the morning . Pending CMP May need to replete potassium with oral potassium chloride into tomorrow.    Admission Orders: Date/Time/Statement:     OP NC 1/6 1053 converted to IP on 1/8 0746 for continued post op care     Start   Ordered   01/08/25 0746  INPATIENT ADMISSION  Once        Transfer Service: Orthopedics   Question Answer Comment   Level of Care Med Surg    Estimated length of stay Inpatient Only Surgery        01/08/25 0745   01/06/25 1053  Outpatient No Charge Bed  (Outpatient No Charge Bed/Extended Recovery)  Once        Transfer Service: Orthopedic Surgery    01/06/25 1052          Diet: reg   Mobility: amb w/ walker   DVT Prophylaxis: SCD    Medications/Pain Control:   Scheduled Medications:  docusate sodium, 100 mg, Oral, BID  enoxaparin, 40 mg, Subcutaneous, Daily  gabapentin, 100 mg, Oral, Q8H  methocarbamol, 500 mg, Oral, Q6H JAYSHREE  povidone-iodine (BETADINE) 10 % 20 Application in sodium chloride 0.9 % 500 mL irrigation bottle, , Irrigation, Once      Continuous IV Infusions:  lactated ringers, 125  mL/hr, Intravenous, Continuous  lactated ringers, 75 mL/hr, Intravenous, Continuous      PRN Meds:  acetaminophen, 650 mg, Oral, Q8H PRN  calcium carbonate, 1,000 mg, Oral, Daily PRN  HYDROcodone-acetaminophen, 1 tablet, Oral, Q6H PRN  HYDROmorphone, 0.5 mg, Intravenous, Q2H PRN  ondansetron, 4 mg, Intravenous, Q6H PRN      Vital Signs (last 3 days)       Date/Time Temp Pulse Resp BP MAP (mmHg) SpO2 O2 Device Cardiac (WD) Patient Position - Orthostatic VS Pain    01/08/25 0700 -- 85 -- -- -- 94 % -- -- -- --    01/08/25 0600 -- 80 -- -- -- 96 % -- -- -- --    01/08/25 0538 -- 112 -- -- -- 96 % -- -- -- 7    01/08/25 0500 -- 119 -- -- -- -- -- -- -- --    01/08/25 0407 -- 84 -- -- -- 97 % -- -- -- --    01/08/25 0400 -- 81 -- -- -- -- -- -- -- --    01/08/25 0300 -- 74 -- -- -- -- -- -- -- --    01/08/25 0200 -- 73 -- -- -- -- -- -- -- --    01/08/25 0100 -- 85 -- -- -- -- -- -- -- --    01/08/25 0000 -- 86 -- -- -- -- -- -- -- --    01/07/25 23:42:29 -- 104 18 141/82 102 96 % -- -- -- --    01/07/25 2318 -- 106 -- -- -- -- -- -- -- --    01/07/25 2300 -- 78 -- -- -- -- -- -- -- --    01/07/25 2146 -- -- -- -- -- -- -- -- -- 6    01/07/25 21:41:54 -- 95 18 128/65 86 96 % -- -- -- --    01/07/25 2042 -- -- -- -- -- -- -- -- -- 8 01/07/25 20:41:45 -- 111 18 141/73 96 97 % -- -- -- --    01/07/25 1920 -- -- -- -- -- -- -- -- -- 8 01/07/25 1814 -- -- -- -- -- -- -- -- -- 5 01/07/25 1738 -- -- -- -- -- -- -- -- -- 5 01/07/25 16:56:55 -- 107 18 144/76 99 95 % -- -- -- --    01/07/25 1543 -- -- -- -- -- -- -- -- -- 6 01/07/25 1420 -- -- -- -- -- -- -- -- -- No Pain    01/07/25 1419 -- -- -- -- -- -- -- -- -- 4 01/07/25 11:21:33 97.6 °F (36.4 °C) 116 18 123/56 78 98 % None (Room air) -- Sitting --    01/07/25 0934 -- -- -- -- -- -- -- -- -- 6    01/07/25 0811 -- -- -- -- -- -- None (Room air) -- -- --    01/07/25 05:15:21 -- 70 -- 116/60 79 97 % -- -- -- --    01/07/25 0305 -- -- -- -- -- -- -- -- -- 9     01/07/25 02:34:10 98.1 °F (36.7 °C) 78 -- 125/64 84 96 % -- -- -- --    01/07/25 00:13:54 98 °F (36.7 °C) 95 -- 129/68 88 96 % -- -- -- --    01/06/25 2045 -- -- -- -- -- -- -- -- -- 7    01/06/25 2031 -- -- -- -- -- -- -- -- -- 8 01/06/25 19:05:40 97.7 °F (36.5 °C) 114 19 128/73 91 96 % None (Room air) -- Sitting --    01/06/25 1900 -- 90 -- -- -- 97 % -- -- -- --    01/06/25 16:22:30 -- 72 -- 137/75 96 97 % -- -- -- --    01/06/25 1600 -- 73 -- -- -- 98 % -- -- -- --    01/06/25 1500 -- 63 -- 123/62 82 94 % -- -- -- --    01/06/25 1430 -- 67 -- -- -- 95 % -- -- -- --    01/06/25 1400 -- 73 -- -- -- 96 % -- -- -- --    01/06/25 13:42:34 97.6 °F (36.4 °C) 77 18 141/74 96 97 % None (Room air) -- Lying --    01/06/25 1331 -- -- -- -- -- -- -- -- -- 8 01/06/25 1317 -- -- -- -- -- -- -- -- -- 8 01/06/25 1300 -- 76 -- 142/84 103 98 % -- -- -- --    01/06/25 1215 -- -- -- -- -- -- -- -- -- 8 01/06/25 1200 -- 72 -- 157/83 108 98 % -- -- -- --    01/06/25 1130 -- 68 -- -- -- 98 % -- -- -- --    01/06/25 1100 -- 86 -- 133/70 91 98 % -- -- -- --    01/06/25 10:47:47 -- 63 -- 121/64 83 97 % -- -- -- --    01/06/25 1039 -- -- -- -- -- -- None (Room air) -- -- No Pain    01/06/25 1030 -- 69 20 132/64 92 96 % None (Room air) -- -- No Pain    01/06/25 1015 -- 66 22 132/58 84 96 % None (Room air) -- -- --    01/06/25 1000 97.5 °F (36.4 °C) 70 18 117/59 -- 96 % None (Room air) -- -- No Pain    01/06/25 0945 -- 76 22 119/60 82 95 % None (Room air) -- -- No Pain    01/06/25 0935 97.4 °F (36.3 °C) 83 20 113/59 -- 95 % None (Room air) WDL -- No Pain    01/06/25 0800 -- 67 30 131/63 90 96 % None (Room air) -- -- --    01/06/25 0730 -- 63 18 114/54 78 98 % -- -- -- --    01/06/25 0700 -- 64 14 123/63 87 98 % -- -- -- --    01/06/25 0652 -- 65 16 122/60 -- 97 % None (Room air) -- -- --    01/06/25 0616 98.8 °F (37.1 °C) 72 25 144/70 -- 98 % None (Room air) -- -- 8          Weight (last 2 days)       Date/Time Weight    01/06/25  0616 78.5 (173)            Pertinent Labs/Diagnostic Test Results:   Radiology:  No orders to display     Cardiology:  ECG 12 lead   Final Result by Tera Jarrell MD (01/07 3560)   Sinus tachycardia   Left axis deviation   Left ventricular hypertrophy with repolarization abnormality ( R in aVL ,    [place] product )   Abnormal ECG   When compared with ECG of 28-Aug-2024 11:57,   ST now depressed in Lateral leads   Confirmed by Tera Jarrell ([id number]) on 1/7/2025 9:48:27 PM        GI:  No orders to display           Results from last 7 days   Lab Units 01/08/25  0533 01/07/25  0517   WBC Thousand/uL 8.35 11.45*   HEMOGLOBIN g/dL 13.5 12.4   HEMATOCRIT % 40.3 36.5   PLATELETS Thousands/uL 211 216         Results from last 7 days   Lab Units 01/08/25  0533 01/07/25  0517   SODIUM mmol/L 138 139   POTASSIUM mmol/L 3.8 3.7   CHLORIDE mmol/L 103 108   CO2 mmol/L 28 26   ANION GAP mmol/L 7 5   BUN mg/dL 14 14   CREATININE mg/dL 0.65 0.57*   EGFR ml/min/1.73sq m 87 90   CALCIUM mg/dL 9.2 8.6     Results from last 7 days   Lab Units 01/08/25  0533 01/07/25  0517   AST U/L 15 13   ALT U/L 13 12   ALK PHOS U/L 69 59   TOTAL PROTEIN g/dL 7.1 5.7*   ALBUMIN g/dL 4.2 3.5   TOTAL BILIRUBIN mg/dL 1.53* 0.77     Results from last 7 days   Lab Units 01/06/25  0945 01/06/25  0611   POC GLUCOSE mg/dl 110 83     Results from last 7 days   Lab Units 01/08/25  0533 01/07/25  0517   GLUCOSE RANDOM mg/dL 98 112           Network Utilization Review Department  ATTENTION: Please call with any questions or concerns to [phone] and carefully listen to the prompts so that you are directed to the right person. All voicemails are confidential.   For Discharge needs, contact Care Management DC Support Team at [phone] opt. 2  Send all requests for admission clinical reviews, approved or denied determinations and any other requests to dedicated fax number below belonging to the campus where the patient is receiving treatment. List of  dedicated fax numbers for the Facilities:  FACILITY NAME UR FAX NUMBER   ADMISSION DENIALS (Administrative/Medical Necessity) [phone]   DISCHARGE SUPPORT TEAM (NETWORK) [phone]   PARENT CHILD HEALTH (Maternity/NICU/Pediatrics) [phone]   [facility] [phone]   [facility] [phone]   [facility] [phone]   [facility] [phone]   Counts include 234 beds at the [facility] [phone]   [facility] [phone]   [facility] [phone]   [facility] [phone]   [facility] [phone]   Critical access hospital [phone]   [facility] [phone]   [facility] [phone]

## 2025-01-08 NOTE — UTILIZATION REVIEW
NOTIFICATION OF INPATIENT ADMISSION      AUTHORIZATION REQUEST   SERVICING FACILITY:   [facility]  [address] [place]  [address]  Tax ID: [id number]  NPI: [id number]  Phone: [phone] ATTENDING PROVIDER:  Attending Name and NPI#: Jethro Ray Md [[id number]]  Address: [address] [place]  [address]  Phone: [phone]   ADMISSION INFORMATION:  Place of Service: Inpatient Acute Care Hospital  Place of Service Code: 21  Inpatient Admission Date/Time: 1/8/25  7:45 AM  Discharge Date/Time: No discharge date for patient encounter.  Admitting Diagnosis Code/Description:  Primary osteoarthritis of right knee [M17.11]  Chronic pain of right knee [M25.561, G89.29]     UTILIZATION REVIEW CONTACT:  Mabel Parker, Utilization   Network Utilization Review Department  Phone: [phone]  Fax: [phone]  Email: [email or web address]  Contact for approvals/pending authorizations, clinical reviews, and discharge.     PHYSICIAN ADVISORY SERVICES:  Medical Necessity Denial & Qnac-ya-Uolt Review  Phone: [phone]  Fax: [phone]  Email: [email or web address]     DISCHARGE SUPPORT TEAM:  For Patients Discharge Needs & Updates  Phone: [phone] opt. 2 Fax: [phone]  Email: [email or web address]

## 2025-01-08 NOTE — ASSESSMENT & PLAN NOTE
Postoperative day 1 status post right TKA in the evening, patient doing quite well although had some difficulty with rehab/PT today, had buckling of the knee and difficulty with dynamic gait stabilization/walking, AVSS with exception of suspected sinus tachycardia with variable heart rate currently at 110, tachycardia could be secondary to procedure will trend hemoglobin and obtain EKG but patient is completely asymptomatic at this moment in time, reactive leukocytosis at 11.45, patient did have a 1.2 drop in hemoglobin to 12.4, pain relatively well-controlled, patient is endorsing some degree of anxiousness, no history of tachycardia, neurovascular intact of the right lower extremity    Ordered EKG  Continue to monitor tachycardia  Repeat CBC and BMP with a.m. labs  May need to replete potassium with oral potassium chloride into tomorrow  Reviewed home medications and history for any causes of tachycardia  Post operative pain management per ortho  Encourage incentive spirometry use  Bowel regimen to avoid post op narcotic induced constipation  Hypotensive and venofer protocols as required  DVT prophylaxis in place  Monitor cbc and bmp post op

## 2025-01-08 NOTE — PLAN OF CARE
Problem: PAIN - ADULT  Goal: Verbalizes/displays adequate comfort level or baseline comfort level  Description: Interventions:  - Encourage patient to monitor pain and request assistance  - Assess pain using appropriate pain scale  - Administer analgesics based on type and severity of pain and evaluate response  - Implement non-pharmacological measures as appropriate and evaluate response  - Consider cultural and social influences on pain and pain management  - Notify physician/advanced practitioner if interventions unsuccessful or patient reports new pain  Outcome: Progressing     Problem: INFECTION - ADULT  Goal: Absence or prevention of progression during hospitalization  Description: INTERVENTIONS:  - Assess and monitor for signs and symptoms of infection  - Monitor lab/diagnostic results  - Monitor all insertion sites, i.e. indwelling lines, tubes, and drains  - Monitor endotracheal if appropriate and nasal secretions for changes in amount and color  - [place] appropriate cooling/warming therapies per order  - Administer medications as ordered  - Instruct and encourage patient and family to use good hand hygiene technique  - Identify and instruct in appropriate isolation precautions for identified infection/condition  Outcome: Progressing  Goal: Absence of fever/infection during neutropenic period  Description: INTERVENTIONS:  - Monitor WBC    Outcome: Progressing     Problem: SAFETY ADULT  Goal: Patient will remain free of falls  Description: INTERVENTIONS:  - Educate patient/family on patient safety including physical limitations  - Instruct patient to call for assistance with activity   - Consult OT/PT to assist with strengthening/mobility   - Keep Call bell within reach  - Keep bed low and locked with side rails adjusted as appropriate  - Keep care items and personal belongings within reach  - Initiate and maintain comfort rounds  - Make Fall Risk Sign visible to staff  - Offer Toileting every 3 Hours,  in advance of need  - Initiate/Maintain bed alarm  - Obtain necessary fall risk management equipment:   - Apply yellow socks and bracelet for high fall risk patients  - Consider moving patient to room near nurses station  Outcome: Progressing  Goal: Maintain or return to baseline ADL function  Description: INTERVENTIONS:  -  Assess patient's ability to carry out ADLs; assess patient's baseline for ADL function and identify physical deficits which impact ability to perform ADLs (bathing, care of mouth/teeth, toileting, grooming, dressing, etc.)  - Assess/evaluate cause of self-care deficits   - Assess range of motion  - Assess patient's mobility; develop plan if impaired  - Assess patient's need for assistive devices and provide as appropriate  - Encourage maximum independence but intervene and supervise when necessary  - Involve family in performance of ADLs  - Assess for home care needs following discharge   - Consider OT consult to assist with ADL evaluation and planning for discharge  - Provide patient education as appropriate  Outcome: Progressing  Goal: Maintains/Returns to pre admission functional level  Description: INTERVENTIONS:  - Perform AM-PAC 6 Click Basic Mobility/ Daily Activity assessment daily.  - Set and communicate daily mobility goal to care team and patient/family/caregiver.   - Collaborate with rehabilitation services on mobility goals if consulted  - Perform Range of Motion 3 times a day.  - Reposition patient every 3 hours.  - Dangle patient 3 times a day  - Stand patient 3 times a day  - Ambulate patient 3 times a day  - Out of bed to chair 3 times a day   - Out of bed for meals 3 times a day  - Out of bed for toileting  - Record patient progress and toleration of activity level   Outcome: Progressing     Problem: DISCHARGE PLANNING  Goal: Discharge to home or other facility with appropriate resources  Description: INTERVENTIONS:  - Identify barriers to discharge w/patient and caregiver  -  Arrange for needed discharge resources and transportation as appropriate  - Identify discharge learning needs (meds, wound care, etc.)  - Arrange for interpretive services to assist at discharge as needed  - Refer to Case Management Department for coordinating discharge planning if the patient needs post-hospital services based on physician/advanced practitioner order or complex needs related to functional status, cognitive ability, or social support system  Outcome: Progressing

## 2025-01-08 NOTE — DISCHARGE SUMMARY
ORTHOPEDICS DISCHARGE SUMMARY  Anisa Rowe 74 y.o. female MRN: [id number]  Unit/Bed#:     Attending Physician: Flor    Admitting diagnosis: Primary osteoarthritis of right knee [M17.11]  Chronic pain of right knee [M25.561, G89.29]    Discharge diagnosis: Primary osteoarthritis of right knee [M17.11]  Chronic pain of right knee [M25.561, G89.29]    Date of admission: 1/6/2025    Date of discharge: 01/08/25         Procedure: Right total knee arthroplasty with robot    HPI:  This is a 74 y.o. year old female that presented to the office with signs and symptoms of right knee osteoarthritis. They tried and failed conservative treatment measures and wished to proceed with surgical intervention. The risks, benefits, and complications of the procedure were discussed with the patient and informed consent was obtained.    Hospital Course:  The patient was admitted to the hospital on 1/6/2025 and underwent an uncomplicated right total knee arthroplasty. They were transferred to the floor after a brief stay in the post-anesthesia care unit. Their pain was well managed with IV and oral pain medications. They began therapy on post operative day #1. Lovenox was also started for DVT prophylaxis 12 hours post operatively.  Hemovac drain was removed on POD1. On discharge date pt was cleared by PT and the medicine team and determined to be safe for discharge.  Daily discussion was had with the patient, nursing staff, orthopaedic team, and family members if present.  All questions were answered to the patients satisifaction.      0   Lab Value Date/Time    HGB 13.5 01/08/2025 0533    HGB 12.4 01/07/2025 0517    HGB 13.6 12/10/2024 0914    HGB 13.4 08/28/2024 1156    HGB 11.8 04/30/2024 0552    HGB 14.5 04/10/2024 1139    HGB 14.2 12/29/2021 2359       Greater than 2 gram drop which qualifies for diagnosis of acute blood loss anemia.  Vital signs remained stable and pt was resuscitated with IVF as needed       Discharge  Instructions:  The patient was discharged weight bearing as tolerated to the right lower extremity. Lovenox will be continued for 28 days. Continue PT/OT. Take pain medications as instructed.    Discharge Medications:  For the complete list of discharge medications, please refer to the patient's medication reconciliation.

## 2025-01-08 NOTE — PHYSICAL THERAPY NOTE
"                                                                                  PHYSICAL THERAPY NOTE          Patient Name: Anisa Rowe  Today's Date: 1/8/2025  09:14-09:54       01/08/25 0914   PT Last Visit   PT Visit Date 01/08/25   Note Type   Note Type Treatment   Pain Assessment   Pain Score 2   Pain Location/Orientation Orientation: Right;Location: Knee   Restrictions/Precautions   RLE Weight Bearing Per Order WBAT   Other Precautions Chair Alarm;Bed Alarm;Fall Risk;Pain   General   Chart Reviewed Yes   Response to Previous Treatment Patient with no complaints from previous session.   Family/Caregiver Present Yes  (friend, \"Gia\")   Cognition   Overall Cognitive Status WFL   Arousal/Participation Alert;Cooperative   Attention Within functional limits   Orientation Level Oriented X4   Memory Within functional limits   Following Commands Follows all commands and directions without difficulty   Comments Pleasant and motivated.   Subjective   Subjective Reports feeling better today. Glad she stayed.  Was able to sleep better last night.   Bed Mobility   Additional Comments received sitting OOB in chair. Resting HR noted to be 121-122   Transfers   Sit to Stand 5  Supervision   Additional items Increased time required;Verbal cues   Stand to Sit 5  Supervision   Additional items Increased time required;Verbal cues;Armrests   Additional Comments Incresaed time to complete transitions.  Cues for upright posture, UE WB for offloading in stance.  Pre gait weight shifting prior to ambulation.   Ambulation/Elevation   Gait pattern Improper Weight shift;Decreased foot clearance;Antalgic;Decreased R stance;Inconsistent mili;Short stride;Excessively slow   Gait Assistance 5  Supervision   Additional items Verbal cues   Assistive Device Rolling walker   Distance (S)  60'x1.  10'x2, 15'x1. HR with activity 136.   Stair Management Assistance 4  Minimal assist   Additional items Assist x 1;Verbal cues;Tactile cues "   Stair Management Technique One rail L;Step to pattern;Foreward;With cane  (rail + cane.)   Number of Stairs 5  (5 steps perfomed x 2.)   Ambulation/Elevation Additional Comments gait slowed, antalgic, short stride, decreased heel strike.  No buckling.   Balance   Static Sitting Good   Dynamic Sitting Fair +   Static Standing Fair   Dynamic Standing Fair -   Ambulatory Fair -   Endurance Deficit   Endurance Deficit Yes   Endurance Deficit Description fatigue, elevated HR with activity to 136.   Activity Tolerance   Activity Tolerance Patient tolerated treatment well;Patient limited by fatigue   Medical Staff Made Aware NurseDomitila. DR Steven   Nurse Made Aware yes. update to nursing and Dr. Steven.  Dr Steven aware of elevated HR at rest and with ambulation.   Exercises   TKR Sitting;10 reps;AAROM;AROM;Right  (issued and reviewed written HEP. Ed on progression of reps and frequency of plan.)   Neuro re-ed Education on posturein standing, UE use for offloading operative limb during gait.  Pre gait weight shifting to assist wtih slow acceptance of weight related to pain,   Assessment   Prognosis Good   Problem List Decreased strength;Decreased range of motion;Decreased endurance;Impaired balance;Decreased mobility;Pain;Orthopedic restrictions   Assessment Anisa is seen for progression of PT POC.  Notes improvements in mobility and ambulation since previous visit with improved confidence to return home today as goal.  Demonstrates S for transfers.  Increased time and cues for upright posture, UE use on RW for offloading operative limb.  Able to progress with ambulation distances to 60 ft with RW support.  Gait slowed, antalgic, decreased foot clearance, step length and decreased heel strike.  No knee buckling throughout session for level surfaces or with stair navigation. Able to negotiate 5 steps with rail + cane and Nancy/CG. Cues for sequencing with cane placement. Of note HR resting p session 115-121.  Resting prior  to therapy 121-122 and with activity to 136.  ( Dr. Steven made aware). Tolerated completion of TKR protocol exercises following mobility training.  Good return demo of exercises and verbalizing understanding regarding HEP upon discharge.  Review of car transfers with good understanding from patient and friend at bedside.  Based on current progress with therapy anticipate ability to return home with support from friends upon discharge.  HHPT recommended given distances limited to household tolerance at this time and will require assistance to enter and exit home.  Spouse is unable to provide physical assistance to patient.  The patient's -[facility] Basic Mobility Inpatient Short Form Raw Score is 21. A Raw score of greater than 16 suggests the patient may benefit from discharge to home. Please also refer to the recommendation of the Physical Therapist for safe discharge planning.  From PT standpoint anticipate ability to discharge to home provided medically stable.   Barriers to Discharge Inaccessible home environment;Decreased caregiver support   Barriers to Discharge Comments 4 COLT with L HR.   Goals   Patient Goals go home   STG Expiration Date 01/13/25   PT Treatment Day 3   Plan   Treatment/Interventions Functional transfer training;LE strengthening/ROM;Therapeutic exercise;Elevations;Endurance training;Patient/family training;Equipment eval/education;Bed mobility;Gait training;Compensatory technique education;Continued evaluation;Spoke to nursing;Family   Progress Progressing toward goals   PT Frequency Twice a day;5-7x/wk  (QD-BID in order to optimize functional outcomes.)   Discharge Recommendation   Rehab Resource Intensity Level, PT III (Minimum Resource Intensity)  (HHPT)   Equipment Recommended Walker  (pt has)   [facility]   Turning in Flat Bed Without Bedrails 4   Lying on Back to Sitting on Edge of Flat Bed Without Bedrails 3   Moving Bed to Chair 3   Standing Up From Chair Using Arms  4   Walk in Room 4   Climb 3-5 Stairs With Railing 3   Basic Mobility Inpatient Raw Score 21   Basic Mobility Standardized Score 45.55   [facility] Highest Level Of Mobility   -HLM Goal 6: Walk 10 steps or more   -HLM Achieved 7: Walk 25 feet or more   Education   Education Provided Home exercise program;Mobility training;Assistive device   Patient Demonstrates acceptance/verbal understanding   End of Consult   Patient Position at End of Consult Bedside chair;All needs within reach;Bed/Chair alarm activated   End of Consult Comments HR resting 115-121 p activity.   Katia Rock, PT

## 2025-01-08 NOTE — ASSESSMENT & PLAN NOTE
POD2 s/p right total knee arthroplasty with Dr. Ray  -Continue with ice and analgesics/NSAIDs as needed for pain.  -Continue to progress activity as tolerated with PT/OT as ordered.  -Weightbearing as tolerated right lower extremity.  -Hemoglobin stable at this time dressings clean, dry and intact.    -Lovenox 40 mg daily for 28 days total.  For DVT prophylaxis.  -Plan discharge home later today after physical therapy clearance. Likely dc today  -Follow-up outpatient as scheduled with Dr. Ray.

## 2025-01-08 NOTE — PROGRESS NOTES
Progress Note - Hospitalist   Name: Anisa Rowe 74 y.o. female I MRN: [id number]  Unit/Bed#: WE 2 N -01 I Date of Admission: 1/6/2025   Date of Service: 1/8/2025 I Hospital Day: 0     Assessment & Plan  Primary osteoarthritis of right knee  Postoperative day 2 status post right TKA, patient doing quite well, pain well-controlled, AVSS, overnight appeared to experience asymptomatic sinus tachycardia which was self-limiting and resolved, hemoglobin stable, neurovascular intact of the right lower extremity    EKG reviewed, self-limited and tachycardia resolved overnight into the morning  Reviewed CBC, hemoglobin stable  Pending CMP May need to replete potassium with oral potassium chloride into tomorrow  Post operative pain management per ortho  Encourage incentive spirometry use  Bowel regimen to avoid post op narcotic induced constipation  Hypotensive and venofer protocols as required  DVT prophylaxis in place  Monitor cbc and bmp post op  Mixed hyperlipidemia  Continue low cholesterol diet and statin therapy      Hospitalist service will follow.    Subjective   Patient seen and examined at bedside.  Patient denies any acute events overnight.  Patient denies any pain in the right lower extremity.  Patient is tired and did not get the best sleep.  Patient denies any neurovascular changes such as numbness, tingling, lack of motor movement in the distal lower right extremity.  Patient denies any sensation of palpitations, racing heart, shortness of breath.  Patient is otherwise feeling herself.  Patient plans to still work with PT/OT today.    Objective :  Temp:  [97.6 °F (36.4 °C)] 97.6 °F (36.4 °C)  HR:  [] 112  BP: (123-144)/(56-82) 141/82  Resp:  [18] 18  SpO2:  [95 %-98 %] 97 %  O2 Device: None (Room air)    I/O         01/06 0701 01/07 0700 01/07 0701 01/08 0700    P.O. 480 480    I.V. (mL/kg) 2200 (28)     Total Intake(mL/kg) 2680 (34.1) 480 (6.1)    Urine (mL/kg/hr) 889 (0.5) 900 (0.5)    Drains  325     Total Output 1214 900    Net +1466 -420          Unmeasured Urine Occurrence 2 x             Physical Exam  Constitutional:       General: She is awake. She is not in acute distress.     Appearance: Normal appearance. She is normal weight. She is not ill-appearing, toxic-appearing or diaphoretic.      Interventions: She is not intubated.  HENT:      Head: Normocephalic and atraumatic.      Right Ear: Hearing and external ear normal.      Left Ear: Hearing and external ear normal.      Nose: Nose normal.   Cardiovascular:      Rate and Rhythm: Normal rate and regular rhythm.      Heart sounds: S1 normal and S2 normal. Heart sounds not distant. Murmur (very slight mild aortic systolic ejection murmur auscultated) heard.      Systolic murmur is present.   Pulmonary:      Effort: Pulmonary effort is normal. No tachypnea, bradypnea, accessory muscle usage, prolonged expiration, respiratory distress or retractions. She is not intubated.      Breath sounds: No stridor, decreased air movement or transmitted upper airway sounds. No decreased breath sounds, wheezing, rhonchi or rales.   Skin:     General: Skin is warm and dry.      Capillary Refill: Capillary refill takes less than 2 seconds.   Psychiatric:         Behavior: Behavior is cooperative.     Right Lower Extremity: Thigh and calf compartments are soft and nontender to palpation. + L3-S1 SILT. + DF/PF.+ EHL. No pain with passive stretch/extension of toes, capillary refill less than 2 seconds, and foot is warm B/L. Incision is c/d/i        Lab Results: I have reviewed the following results:  Recent Labs     01/08/25  0533   WBC 8.35   HGB 13.5   HCT 40.3      SODIUM 138   K 3.8      CO2 28   BUN 14   CREATININE 0.65   GLUC 98   AST 15   ALT 13   ALB 4.2   TBILI 1.53*   ALKPHOS 69       Imaging Results Review: No pertinent imaging studies reviewed.  Other Study Results Review: EKG was reviewed.     VTE Pharmacologic Prophylaxis: Enoxaparin  (Lovenox)  VTE Mechanical Prophylaxis: sequential compression device

## 2025-01-08 NOTE — PROGRESS NOTES
"Progress Note - Orthopedics   Name: Anisa Rowe 74 y.o. female I MRN: [id number]  Unit/Bed#: WE 2 N -01 I Date of Admission: 1/6/2025   Date of Service: 1/8/2025 I Hospital Day: 0     Assessment & Plan  Primary osteoarthritis of right knee  POD2 s/p right total knee arthroplasty with Dr. Ray  -Continue with ice and analgesics/NSAIDs as needed for pain.  -Continue to progress activity as tolerated with PT/OT as ordered.  -Weightbearing as tolerated right lower extremity.  -Hemoglobin stable at this time dressings clean, dry and intact.    -Lovenox 40 mg daily for 28 days total.  For DVT prophylaxis.  -Plan discharge home later today after physical therapy clearance. Likely dc today  -Follow-up outpatient as scheduled with Dr. Ray.        Subjective   74 y.o.female  No acute events, no new complaints. Pain well controlled. Denies fevers, chills, CP, SOB, N/V, numbness or tingling. Patient reports no issues with urination or bowel movements. Patient states she has no concerns this morning. She is aware of sinus tacy overnight but no other symptoms this AM.    Objective :  Temp:  [97.6 °F (36.4 °C)] 97.6 °F (36.4 °C)  HR:  [] 112  BP: (123-144)/(56-82) 141/82  Resp:  [18] 18  SpO2:  [95 %-98 %] 97 %  O2 Device: None (Room air)    Physical Exam  Musculoskeletal: right lower  No erythema or ecchymosis.  Dressing clean, dry and intact.  Drain discontinued this morning.  TTP jason-incisional area  Motor intact to +FHL/EHL, +ankle dorsi/plantar flexion  Sensation intact to saphenous, sural, tibial, superficial peroneal nerve, and deep peroneal  2+ DP pulse  No calf swelling or tenderness to palpation      Lab Results: I have reviewed the following results:  Recent Labs     01/07/25  0517 01/08/25  0533   WBC 11.45* 8.35   HGB 12.4 13.5   HCT 36.5 40.3    211   BUN 14 14   CREATININE 0.57* 0.65     Blood Culture:  No results found for: \"BLOODCX\"  Wound Culture: No results found for: " "\"WOUNDCULT\"      Dov Dorsey PA-C  " Rituxan Pregnancy And Lactation Text: This medication is Pregnancy Category C and it isn't know if it is safe during pregnancy. It is unknown if this medication is excreted in breast milk but similar antibodies are known to be excreted.

## 2025-01-08 NOTE — ASSESSMENT & PLAN NOTE
Postoperative day 2 status post right TKA, patient doing quite well, pain well-controlled, AVSS, overnight appeared to experience asymptomatic sinus tachycardia which was self-limiting and resolved, hemoglobin stable, neurovascular intact of the right lower extremity    EKG reviewed, self-limited and tachycardia resolved overnight into the morning  Reviewed CBC, hemoglobin stable  Pending CMP May need to replete potassium with oral potassium chloride into tomorrow  Post operative pain management per ortho  Encourage incentive spirometry use  Bowel regimen to avoid post op narcotic induced constipation  Hypotensive and venofer protocols as required  DVT prophylaxis in place  Monitor cbc and bmp post op

## 2025-01-08 NOTE — PLAN OF CARE
Problem: PAIN - ADULT  Goal: Verbalizes/displays adequate comfort level or baseline comfort level  Description: Interventions:  - Encourage patient to monitor pain and request assistance  - Assess pain using appropriate pain scale  - Administer analgesics based on type and severity of pain and evaluate response  - Implement non-pharmacological measures as appropriate and evaluate response  - Consider cultural and social influences on pain and pain management  - Notify physician/advanced practitioner if interventions unsuccessful or patient reports new pain  Outcome: Progressing     Problem: INFECTION - ADULT  Goal: Absence or prevention of progression during hospitalization  Description: INTERVENTIONS:  - Assess and monitor for signs and symptoms of infection  - Monitor lab/diagnostic results  - Monitor all insertion sites, i.e. indwelling lines, tubes, and drains  - Monitor endotracheal if appropriate and nasal secretions for changes in amount and color  - Dell appropriate cooling/warming therapies per order  - Administer medications as ordered  - Instruct and encourage patient and family to use good hand hygiene technique  - Identify and instruct in appropriate isolation precautions for identified infection/condition  Outcome: Progressing  Goal: Absence of fever/infection during neutropenic period  Description: INTERVENTIONS:  - Monitor WBC    Outcome: Progressing     Problem: SAFETY ADULT  Goal: Patient will remain free of falls  Description: INTERVENTIONS:  - Educate patient/family on patient safety including physical limitations  - Instruct patient to call for assistance with activity   - Consult OT/PT to assist with strengthening/mobility   - Keep Call bell within reach  - Keep bed low and locked with side rails adjusted as appropriate  - Keep care items and personal belongings within reach  - Initiate and maintain comfort rounds  - Make Fall Risk Sign visible to staff  - Offer Toileting every 2-4  Hours, in advance of need  - Initiate/Maintain bed and chair alarm  - Obtain necessary fall risk management equipment: nonskid socks   - Apply yellow socks and bracelet for high fall risk patients  - Consider moving patient to room near nurses station  Outcome: Progressing  Goal: Maintain or return to baseline ADL function  Description: INTERVENTIONS:  -  Assess patient's ability to carry out ADLs; assess patient's baseline for ADL function and identify physical deficits which impact ability to perform ADLs (bathing, care of mouth/teeth, toileting, grooming, dressing, etc.)  - Assess/evaluate cause of self-care deficits   - Assess range of motion  - Assess patient's mobility; develop plan if impaired  - Assess patient's need for assistive devices and provide as appropriate  - Encourage maximum independence but intervene and supervise when necessary  - Involve family in performance of ADLs  - Assess for home care needs following discharge   - Consider OT consult to assist with ADL evaluation and planning for discharge  - Provide patient education as appropriate  Outcome: Progressing  Goal: Maintains/Returns to pre admission functional level  Description: INTERVENTIONS:  - Perform AM-PAC 6 Click Basic Mobility/ Daily Activity assessment daily.  - Set and communicate daily mobility goal to care team and patient/family/caregiver.   - Collaborate with rehabilitation services on mobility goals if consulted  - Perform Range of Motion 3 times a day.  - Reposition patient every 3 hours.  - Dangle patient 3 times a day  - Stand patient 3 times a day  - Ambulate patient 3 times a day  - Out of bed to chair 3 times a day   - Out of bed for meals 3 times a day  - Out of bed for toileting  - Record patient progress and toleration of activity level   Outcome: Progressing     Problem: DISCHARGE PLANNING  Goal: Discharge to home or other facility with appropriate resources  Description: INTERVENTIONS:  - Identify barriers to  discharge w/patient and caregiver  - Arrange for needed discharge resources and transportation as appropriate  - Identify discharge learning needs (meds, wound care, etc.)  - Arrange for interpretive services to assist at discharge as needed  - Refer to Case Management Department for coordinating discharge planning if the patient needs post-hospital services based on physician/advanced practitioner order or complex needs related to functional status, cognitive ability, or social support system  Outcome: Progressing     Problem: Knowledge Deficit  Goal: Patient/family/caregiver demonstrates understanding of disease process, treatment plan, medications, and discharge instructions  Description: Complete learning assessment and assess knowledge base.  Interventions:  - Provide teaching at level of understanding  - Provide teaching via preferred learning methods  Outcome: Progressing

## 2025-01-08 NOTE — PLAN OF CARE
Problem: PHYSICAL THERAPY ADULT  Goal: Performs mobility at highest level of function for planned discharge setting.  See evaluation for individualized goals.  Description: Treatment/Interventions: Functional transfer training, LE strengthening/ROM, Elevations, Therapeutic exercise, Endurance training, Patient/family training, Bed mobility, Gait training, Spoke to nursing, OT  Equipment Recommended: Walker (pt owns)       See flowsheet documentation for full assessment, interventions and recommendations.  1/8/2025 1156 by Katia Rock PT  Outcome: Adequate for Discharge  Note: Prognosis: Good  Problem List: Decreased strength, Decreased range of motion, Decreased endurance, Impaired balance, Decreased mobility, Pain, Orthopedic restrictions  Assessment: Anisa is seen for progression of PT POC.  Notes improvements in mobility and ambulation since previous visit with improved confidence to return home today as goal.  Demonstrates S for transfers.  Increased time and cues for upright posture, UE use on RW for offloading operative limb.  Able to progress with ambulation distances to 60 ft with RW support.  Gait slowed, antalgic, decreased foot clearance, step length and decreased heel strike.  No knee buckling throughout session for level surfaces or with stair navigation. Able to negotiate 5 steps with rail + cane and Nancy/CG. Cues for sequencing with cane placement. Of note HR resting p session 115-121.  Resting prior to therapy 121-122 and with activity to 136.  ( Dr. Steven made aware). Tolerated completion of TKR protocol exercises following mobility training.  Good return demo of exercises and verbalizing understanding regarding HEP upon discharge.  Review of car transfers with good understanding from patient and friend at bedside.  Based on current progress with therapy anticipate ability to return home with support from friends upon discharge.  HHPT recommended given distances limited to household  tolerance at this time and will require assistance to enter and exit home.  Spouse is unable to provide physical assistance to patient.  The patient's AM-PAC Basic Mobility Inpatient Short Form Raw Score is 21. A Raw score of greater than 16 suggests the patient may benefit from discharge to home. Please also refer to the recommendation of the Physical Therapist for safe discharge planning.  From PT standpoint anticipate ability to discharge to home provided medically stable.  Barriers to Discharge: Inaccessible home environment, Decreased caregiver support  Barriers to Discharge Comments: 4 COLT with L HR.  Rehab Resource Intensity Level, PT: III (Minimum Resource Intensity) (HHPT)    See flowsheet documentation for full assessment.     1/8/2025 1156 by Katia Rock, PT  Outcome: Progressing  Note: Prognosis: Good  Problem List: Decreased strength, Decreased range of motion, Decreased endurance, Impaired balance, Decreased mobility, Pain, Orthopedic restrictions  Assessment: Anisa is seen for progression of PT POC.  Notes improvements in mobility and ambulation since previous visit with improved confidence to return home today as goal.  Demonstrates S for transfers.  Increased time and cues for upright posture, UE use on RW for offloading operative limb.  Able to progress with ambulation distances to 60 ft with RW support.  Gait slowed, antalgic, decreased foot clearance, step length and decreased heel strike.  No knee buckling throughout session for level surfaces or with stair navigation. Able to negotiate 5 steps with rail + cane and Nancy/CG. Cues for sequencing with cane placement. Of note HR resting p session 115-121.  Resting prior to therapy 121-122 and with activity to 136.  ( Dr. Steven made aware). Tolerated completion of TKR protocol exercises following mobility training.  Good return demo of exercises and verbalizing understanding regarding HEP upon discharge.  Review of car transfers with good  understanding from patient and friend at bedside.  Based on current progress with therapy anticipate ability to return home with support from friends upon discharge.  HHPT recommended given distances limited to household tolerance at this time and will require assistance to enter and exit home.  Spouse is unable to provide physical assistance to patient.  The patient's AM-PAC Basic Mobility Inpatient Short Form Raw Score is 21. A Raw score of greater than 16 suggests the patient may benefit from discharge to home. Please also refer to the recommendation of the Physical Therapist for safe discharge planning.  From PT standpoint anticipate ability to discharge to home provided medically stable.  Barriers to Discharge: Inaccessible home environment, Decreased caregiver support  Barriers to Discharge Comments: 4 COLT with L HR.  Rehab Resource Intensity Level, PT: III (Minimum Resource Intensity) (HHPT)    See flowsheet documentation for full assessment.

## 2025-01-08 NOTE — QUICK NOTE
We performed a random troponin due to intermittent asymptomatic tachycardia POD2. Pt was asymptomatic and O2 sats were always WNL. There was no CHINCHILLA with Physical Therapy.    The troponin came back at 3. This suggests no cardiac injury or strain. With this information will be ok for DC from medical standpoint.

## 2025-01-08 NOTE — PROGRESS NOTES
Progress Note - Hospitalist   Name: Anisa Rowe 74 y.o. female I MRN: [id number]  Unit/Bed#: [facility] 2 N -01 I Date of Admission: 1/6/2025   Date of Service: 1/7/2025 I Hospital Day: 0     Assessment & Plan  Primary osteoarthritis of right knee  Postoperative day 1 status post right TKA in the evening, patient doing quite well although had some difficulty with rehab/PT today, had buckling of the knee and difficulty with dynamic gait stabilization/walking, AVSS with exception of suspected sinus tachycardia with variable heart rate currently at 110, tachycardia could be secondary to procedure will trend hemoglobin and obtain EKG but patient is completely asymptomatic at this moment in time, reactive leukocytosis at 11.45, patient did have a 1.2 drop in hemoglobin to 12.4, pain relatively well-controlled, patient is endorsing some degree of anxiousness, no history of tachycardia, neurovascular intact of the right lower extremity    Ordered EKG  Continue to monitor tachycardia  Repeat CBC and BMP with a.m. labs  May need to replete potassium with oral potassium chloride into tomorrow  Reviewed home medications and history for any causes of tachycardia  Post operative pain management per ortho  Encourage incentive spirometry use  Bowel regimen to avoid post op narcotic induced constipation  Hypotensive and venofer protocols as required  DVT prophylaxis in place  Monitor cbc and bmp post op  Mixed hyperlipidemia  Continue low cholesterol diet and statin therapy      Hospitalist service will follow.    Subjective   Patient was seen examined bedside.  Patient reports that she is feeling quite well as a pain well-controlled.  Patient does endorse the idea of having some slight anxiousness.  Patient denies any neurovascular issues such as numbness, tingling, LOC monitor collected sensation in the distal lower right extremity.  Patient was somewhat disappointed in herself for not being able to be discharged today.  Patient  reviewed her discharge as a potential success or failure.  Patient reports that she has improved by learning from techniques and tactics with physical therapy when ambulating.  Patient has had the walker set to a higher height.  Him to stand up more straight rather than kyphotic.  Patient did quite well with PT/OT today.  Patient is still ambulating.  Patient has been tolerating diet.  Patient denies any shortness of breath, chest pain, fluttering in her chest, palpitations patient does not feel like her heart is beating fast.  Patient has no history of benzodiazepine use, alcohol use, tachycardia historically.  Patient has no history of being on beta-blocker or CCB's.  Patient feels very well herself currently.    Objective :  Temp:  [97.6 °F (36.4 °C)-98.1 °F (36.7 °C)] 97.6 °F (36.4 °C)  HR:  [] 107  BP: (116-144)/(56-76) 144/76  Resp:  [18] 18  SpO2:  [95 %-98 %] 95 %  O2 Device: None (Room air)    I/O         01/06 0701  01/07 0700 01/07 0701  01/08 0700    P.O. 480 480    I.V. (mL/kg) 2200 (28)     Total Intake(mL/kg) 2680 (34.1) 480 (6.1)    Urine (mL/kg/hr) 889 (0.5) 200 (0.2)    Drains 325     Total Output 1214 200    Net +1466 +280          Unmeasured Urine Occurrence 2 x             Physical Exam  Constitutional:       General: She is awake. She is not in acute distress.     Appearance: Normal appearance. She is normal weight. She is not ill-appearing, toxic-appearing or diaphoretic.      Interventions: She is not intubated.  HENT:      Head: Normocephalic and atraumatic.      Right Ear: Hearing and external ear normal.      Left Ear: Hearing and external ear normal.      Nose: Nose normal.   Cardiovascular:      Rate and Rhythm: Regular rhythm. Tachycardia present.      Heart sounds: Normal heart sounds, S1 normal and S2 normal. Heart sounds not distant. No murmur heard.  Pulmonary:      Effort: Pulmonary effort is normal. No tachypnea, bradypnea, accessory muscle usage, prolonged expiration,  respiratory distress or retractions. She is not intubated.      Breath sounds: Normal breath sounds. No stridor, decreased air movement or transmitted upper airway sounds. No decreased breath sounds, wheezing, rhonchi or rales.   Abdominal:      General: Abdomen is flat. There is no distension.      Tenderness: There is no abdominal tenderness.   Skin:     General: Skin is warm and dry.      Capillary Refill: Capillary refill takes less than 2 seconds.   Neurological:      General: No focal deficit present.      Mental Status: She is alert and oriented to person, place, and time.      Sensory: Sensation is intact.      Motor: Motor function is intact.   Psychiatric:         Behavior: Behavior is cooperative.       Right Lower Extremity: Thigh and calf compartments are soft and nontender to palpation. + L3-S1 SILT. + DF/PF.+ EHL. No pain with passive stretch/extension of toes.  DP 2+, capillary refill less than 2 seconds, and foot is warm B/L. Incision is c/d/i      Lab Results: I have reviewed the following results:  Recent Labs     01/07/25  0517   WBC 11.45*   HGB 12.4   HCT 36.5      SODIUM 139   K 3.7      CO2 26   BUN 14   CREATININE 0.57*   GLUC 112   AST 13   ALT 12   ALB 3.5   TBILI 0.77   ALKPHOS 59       Imaging Results Review: No pertinent imaging studies reviewed.  Other Study Results Review: EKG was reviewed.     VTE Pharmacologic Prophylaxis: Enoxaparin (Lovenox)  VTE Mechanical Prophylaxis: sequential compression device

## 2025-01-08 NOTE — NURSING NOTE
Spoke with Dr Steven.  PT hr is tachy up to 139 @ rest bp and O2 is normal.  EKG was done last night.  We are doing a stat trop.

## 2025-01-09 ENCOUNTER — TELEPHONE (OUTPATIENT)
Dept: OBGYN CLINIC | Facility: HOSPITAL | Age: 75
End: 2025-01-09

## 2025-01-09 NOTE — TELEPHONE ENCOUNTER
Patient contacted for a postoperative follow up assessment. Patient states current pain level of a 4/10 when sitting and 7/10 when walking with RW.  Patient reports increase in swelling and dressing is Dressing C/D/I Patient isicing the site regularly. NN educated patient on post-op bruising, swelling, and icing. Patient fell yesterday 1/8. Patient stated the surgical knee started to buckle and patient dropped to her knees. Patient was helped up by . She states her arms and knees feel achy. Rest, ice and elevation recommended by NN. Will inform the surgeon. In home PT 1/9 at 2:30PM.     We reviewed patients AVS medication list. Patient is taking Lovenox injections daily, Methocarbamol 500mg TID, and Norco 5-325 every 6 hours..Patient has not had a BM but ispassing gas.       Patient denies nausea, vomiting, abdominal pain, chest pain, shortness of breath, fever, dizziness, and calf pain. Patient confirmed post-op appointment with surgeon on 1/7 at 10:30  .Patient does not have any other questions or concerns at this time. Pt was encouraged to call with any questions, concerns or issues.  .

## 2025-01-10 ENCOUNTER — OFFICE VISIT (OUTPATIENT)
Dept: OBGYN CLINIC | Facility: MEDICAL CENTER | Age: 75
End: 2025-01-10

## 2025-01-10 ENCOUNTER — TELEPHONE (OUTPATIENT)
Age: 75
End: 2025-01-10

## 2025-01-10 ENCOUNTER — APPOINTMENT (OUTPATIENT)
Dept: RADIOLOGY | Facility: MEDICAL CENTER | Age: 75
End: 2025-01-10
Payer: COMMERCIAL

## 2025-01-10 DIAGNOSIS — Z96.651 AFTERCARE FOLLOWING RIGHT KNEE JOINT REPLACEMENT SURGERY: ICD-10-CM

## 2025-01-10 DIAGNOSIS — Z47.1 AFTERCARE FOLLOWING RIGHT KNEE JOINT REPLACEMENT SURGERY: ICD-10-CM

## 2025-01-10 DIAGNOSIS — Z47.1 AFTERCARE FOLLOWING RIGHT KNEE JOINT REPLACEMENT SURGERY: Primary | ICD-10-CM

## 2025-01-10 DIAGNOSIS — Z96.651 AFTERCARE FOLLOWING RIGHT KNEE JOINT REPLACEMENT SURGERY: Primary | ICD-10-CM

## 2025-01-10 PROCEDURE — 73560 X-RAY EXAM OF KNEE 1 OR 2: CPT

## 2025-01-10 PROCEDURE — 99024 POSTOP FOLLOW-UP VISIT: CPT

## 2025-01-10 NOTE — TELEPHONE ENCOUNTER
[facility]     [facility]: Flor     Reason for call: She called to get instructions on patient's incision care/bandaging/showering, after today's visit.   She is asking if the instructions can be written out and faxed to their office.     Call back#: [phone]    Fax#: [phone]

## 2025-01-10 NOTE — PROGRESS NOTES
Assessment:   Diagnosis ICD-10-CM Associated Orders   1. Aftercare following right knee joint replacement surgery  Z47.1 XR knee 1 or 2 vw right    Z96.651           Plan:  74 y.o. female 4 days status post right total knee arthroplasty  Discharged from hospital on 1/8 to home  While at home, she felt her knee buckle causing her to fall, most of her weight landed on the right knee  Increased pain after the fall, with decreased range of motion of the knee  X-rays taken and examined today indicate no acute fractures dislocation or disruption of surgical fixation  Physical exam suggest intact extensor mechanism  Continue with home physical therapy  Follow-up next week for reevaluation and removal of staples    The above stated was discussed in layman's terms and the patient expressed understanding.  All questions were answered to the patient's satisfaction.     To do next visit:  Return in about 1 week (around 1/17/2025) for removal of staples.      Subjective:   Anisa Rowe is a 74 y.o. female who presents 4 days status post right total knee arthroplasty.  Patient was discharged from [facility] on 1/8 to home, where she explains her knee buckled causing her to fall with her right knee taking majority of the force.  Fortunately her  was able to help her off the floor.  She admits to increased pain of the right knee on Wednesday and Thursday with decreased range of motion however today admits to feeling better.  She was able to work with home physical therapy yesterday however admits to minimal range of motion.      Review of systems negative unless otherwise specified in HPI    Past Medical History:   Diagnosis Date   • Arthritis     stiff knees   • Exercises daily    • Female bladder prolapse     VEC today 11/16/2020     pelvic repair 3/2020   • Hyperlipidemia     borderline   • Hypoglycemia    • Kidney stone    • Lyme disease     3 years ago   • Migraine     not recent   • Osteoporosis      "borderline   • Urinary incontinence     at times   • Urinary urgency    • Uterine prolapse    • Wears dentures     bridge lower   • Wears glasses        Past Surgical History:   Procedure Laterality Date   • OR ARTHRP ACETBLR/PROX FEM PROSTC AGRFT/ALGRFT Left 4/29/2024    Procedure: ARTHROPLASTY HIP TOTAL;  Surgeon: Jethro Ray MD;  [facility];  Service: Orthopedics   • OR ARTHRP KNE CONDYLE&PLATU MEDIAL&LAT COMPARTMENTS Right 1/6/2025    Procedure: robotically assisted right total knee arthroplasty, all associated procedures as indicated;  Surgeon: Jethro Ray MD;  [facility];  Service: Orthopedics   • OR CMBND ANTERPOST COLPORRAPHY W/CYSTO N/A 11/16/2020    Procedure: ANT COLPORRHAPHY;  Surgeon: Jerrell Zhu MD;  [facility];  Service: UroGynecology          • OR COLPOPEXY VAGINAL EXTRAPERITONEAL APPROACH N/A 3/9/2020    Procedure: V.E.C.(ENPLACE);  Surgeon: Jerrell Zhu MD;  [facility];  Service: UroGynecology          • OR COLPOPEXY VAGINAL EXTRAPERITONEAL APPROACH N/A 11/16/2020    Procedure: V.E.C. w/ enplace;  Surgeon: Jerrell Zhu MD;  [facility];  Service: UroGynecology          • OR CYSTOURETHROSCOPY N/A 3/9/2020    Procedure: CYSTOSCOPY;  Surgeon: Jerrell Zhu MD;  [facility];  Service: UroGynecology          • OR CYSTOURETHROSCOPY N/A 11/16/2020    Procedure: CYSTOSCOPY;  Surgeon: Jerrell Zhu MD;  [facility];  Service: UroGynecology          • OR POST COLPORRHAPHY RECTOCELE W/WO PERINEORRHAPHY N/A 3/9/2020    Procedure: ANTERIOR AND POST COLPORRHAPHY;  Surgeon: Jerrell Zhu MD;  [facility];  Service: UroGynecology          • OR SLING OPERATION STRESS INCONTINENCE N/A 3/9/2020    Procedure: SINGLE INCISION SLING;  Surgeon: Jerrell Zhu MD;  [facility];  Service: UroGynecology          • VAGINAL DELIVERY      \"46 yrs ago did have sedation\"   • WISDOM TOOTH EXTRACTION   " "      Family History   Problem Relation Age of Onset   • Osteoporosis Mother    • Stroke Father    • Diabetes Father    • Diabetes Sister        Social History     Occupational History   • Occupation:      Comment: retired   Tobacco Use   • Smoking status: Never   • Smokeless tobacco: Never   Vaping Use   • Vaping status: Never Used   Substance and Sexual Activity   • Alcohol use: Never   • Drug use: Never   • Sexual activity: Not Currently         Current Outpatient Medications:   •  acetaminophen (TYLENOL) 500 mg tablet, Take 2 tablets (1,000 mg total) by mouth every 6 (six) hours as needed for mild pain, Disp: 90 tablet, Rfl: 0  •  Bromelains (BROMELAIN PO), Take by mouth in the morning, Disp: , Rfl:   •  cholecalciferol (VITAMIN D3) 1,000 units tablet, Take 1,000 Units by mouth daily, Disp: , Rfl:   •  HYDROcodone-acetaminophen (Norco) 5-325 mg per tablet, Take 1 tablet by mouth every 6 (six) hours as needed for pain for up to 10 days Max Daily Amount: 4 tablets, Disp: 30 tablet, Rfl: 0  •  methocarbamol (ROBAXIN) 500 mg tablet, Take 1 tablet (500 mg total) by mouth 3 (three) times a day as needed for muscle spasms, Disp: 60 tablet, Rfl: 0  •  Omega 3 1200 MG CAPS, Take by mouth in the morning, Disp: , Rfl:   •  enoxaparin (LOVENOX) 40 mg/0.4 mL, Inject 0.4 mL (40 mg total) under the skin daily for 28 days To start postoperatively, Disp: 11.2 mL, Rfl: 0    Allergies   Allergen Reactions   • Other Nausea Only and Headache     Chemicals and perfumes   • Wheat Bran - Food Allergy Diarrhea     \"just wheat\"          There were no vitals filed for this visit.    Objective:  Physical exam  General: Awake, Alert, Oriented  Eyes: Pupils equal, round and reactive to light  Heart: regular rate and rhythm  Lungs: No audible wheezing  Abdomen: soft                    Ortho Exam  Right knee:  Incision clean dry and intact  Surgical dressing removed in office today; increased amount of dried blood surrounding previous " "drain site, likely due to fall  No evidence of acute bleeding in office today  Marcy well approximated   No erythema   moderate ecchymosis of leg  Appropriate swelling of lower limb  Appropriate warmth of knee  Extensor mechanism intact  Extension 20  Flexion 70  Calf compartments soft and supple  Sensation intact  Toes are warm sensate and mobile     Diagnostics, reviewed and taken today if performed as documented:    Right knee x-ray:   - Well aligned prosthesis without evidence of fractures, acute changes, or hardware failure  - Prosthesis appears properly sized and properly bonded to surrounding bone     - skin staples in place         Procedures, if performed today:    None performed      Portions of the record may have been created with voice recognition software.  Occasional wrong word or \"sound a like\" substitutions may have occurred due to the inherent limitations of voice recognition software.  Read the chart carefully and recognize, using context, where substitutions have occurred.      "

## 2025-01-17 ENCOUNTER — OFFICE VISIT (OUTPATIENT)
Dept: OBGYN CLINIC | Facility: MEDICAL CENTER | Age: 75
End: 2025-01-17

## 2025-01-17 ENCOUNTER — TELEPHONE (OUTPATIENT)
Age: 75
End: 2025-01-17

## 2025-01-17 VITALS — HEIGHT: 68 IN | BODY MASS INDEX: 26.22 KG/M2 | WEIGHT: 173 LBS

## 2025-01-17 DIAGNOSIS — Z96.651 AFTERCARE FOLLOWING RIGHT KNEE JOINT REPLACEMENT SURGERY: Primary | ICD-10-CM

## 2025-01-17 DIAGNOSIS — Z47.1 AFTERCARE FOLLOWING RIGHT KNEE JOINT REPLACEMENT SURGERY: Primary | ICD-10-CM

## 2025-01-17 PROCEDURE — 99024 POSTOP FOLLOW-UP VISIT: CPT | Performed by: ORTHOPAEDIC SURGERY

## 2025-01-17 NOTE — TELEPHONE ENCOUNTER
"Spoke to Maria Teresa with home health- reviewed OV note from today: \"The patient can shower letting soapy water over incision, yet should not to submerge the incision, and then pat dry.  \"  "

## 2025-01-17 NOTE — TELEPHONE ENCOUNTER
Caller: Maria Teresa [facility]    Doctor/Office: Flor    Call regarding :  Maria Teresa called with questions for orders for incision care now that staples have been removed and if patient is able to shower. Please advise.    Call was transferred to: [phone] - Maria Teresa stated able to leave message if not answered private phone number.

## 2025-01-17 NOTE — PROGRESS NOTES
Assessment:  1. Aftercare following right knee joint replacement surgery  Ambulatory Referral to Home Health          Plan:  11 days s/p right TKA with robot, 1/6/225  The patient is doing well   She should continue daily Lovenox, pain medications as needed and current physical therapy regimen.    The patient can shower letting soapy water over incision, yet should not to submerge the incision, and then pat dry.    Continue home physical therapy as patient has limited ability to leave her home and requires assistive device for ambulation.  She is unable to walk short distances at this time  The patient should follow up in 4 weeks      To do next visit:  Return in about 4 weeks (around 2/14/2025).    The above stated was discussed in layman's terms and the patient expressed understanding.  All questions were answered to the patient's satisfaction.       Scribe Attestation      I,:  Josh Jain MA am acting as a scribe while in the presence of the attending physician.:       I,:  Jethro Ray MD personally performed the services described in this documentation    as scribed in my presence.:               Subjective:   Anisa Rowe is a 74 y.o. female who presents 11 days s/p right TKA with robot, 1/6/225.  She did have a fall about one wee ago.  The patient is doing well.  Today she complains of generalized right pain.  She does participate in physical therapy.  She does use Lovenox daily.  She does use hydrocodone, Tylenol and methocarbamol for pain control.  She denies fever, chills or shortness of breath.        Review of systems negative unless otherwise specified in HPI    Past Medical History:   Diagnosis Date    Arthritis     stiff knees    Exercises daily     Female bladder prolapse     VEC today 11/16/2020     pelvic repair 3/2020    Hyperlipidemia     borderline    Hypoglycemia     Kidney stone     Lyme disease     3 years ago    Migraine     not recent    Osteoporosis     borderline    Urinary  "incontinence     at times    Urinary urgency     Uterine prolapse     Wears dentures     bridge lower    Wears glasses        Past Surgical History:   Procedure Laterality Date    [place] ARTHRP ACETBLR/PROX FEM PROSTC AGRFT/ALGRFT Left 4/29/2024    Procedure: ARTHROPLASTY HIP TOTAL;  Surgeon: Jethro Ray MD;  [facility];  Service: Orthopedics    [place] ARTHRP KNE CONDYLE&PLATU MEDIAL&LAT COMPARTMENTS Right 1/6/2025    Procedure: robotically assisted right total knee arthroplasty, all associated procedures as indicated;  Surgeon: Jethro Ray MD;  [facility];  Service: Orthopedics    [place] CMBND ANTERPOST COLPORRAPHY W/CYSTO N/A 11/16/2020    Procedure: ANT COLPORRHAPHY;  Surgeon: Jerrell Zhu MD;  [facility];  Service: UroGynecology           [place] COLPOPEXY VAGINAL EXTRAPERITONEAL APPROACH N/A 3/9/2020    Procedure: V.E.C.(ENPLACE);  Surgeon: Jerrell Zhu MD;  [facility];  Service: UroGynecology           [place] COLPOPEXY VAGINAL EXTRAPERITONEAL APPROACH N/A 11/16/2020    Procedure: V.E.C. w/ enplace;  Surgeon: Jerrell Zhu MD;  [facility];  Service: UroGynecology           [place] CYSTOURETHROSCOPY N/A 3/9/2020    Procedure: CYSTOSCOPY;  Surgeon: Jerrell Zhu MD;  [facility];  Service: UroGynecology           [place] CYSTOURETHROSCOPY N/A 11/16/2020    Procedure: CYSTOSCOPY;  Surgeon: Jerrell Zhu MD;  [facility];  Service: UroGynecology           [place] POST COLPORRHAPHY RECTOCELE W/WO PERINEORRHAPHY N/A 3/9/2020    Procedure: ANTERIOR AND POST COLPORRHAPHY;  Surgeon: Jerrell Zhu MD;  [facility];  Service: UroGynecology           WV SLING OPERATION STRESS INCONTINENCE N/A 3/9/2020    Procedure: SINGLE INCISION SLING;  Surgeon: Jerrell Zhu MD;  [facility];  Service: UroGynecology           VAGINAL DELIVERY      \"47 yrs ago did have sedation\"    WISDOM TOOTH EXTRACTION         Family History   Problem " "Relation Age of Onset    Osteoporosis Mother     Stroke Father     Diabetes Father     Diabetes Sister        Social History     Occupational History    Occupation: [employer]     Comment: retired   Tobacco Use    Smoking status: Never    Smokeless tobacco: Never   Vaping Use    Vaping status: Never Used   Substance and Sexual Activity    Alcohol use: Never    Drug use: Never    Sexual activity: Not Currently         Current Outpatient Medications:     acetaminophen (TYLENOL) 500 mg tablet, Take 2 tablets (1,000 mg total) by mouth every 6 (six) hours as needed for mild pain, Disp: 90 tablet, Rfl: 0    Bromelains (BROMELAIN PO), Take by mouth in the morning, Disp: , Rfl:     cholecalciferol (VITAMIN D3) 1,000 units tablet, Take 1,000 Units by mouth daily, Disp: , Rfl:     HYDROcodone-acetaminophen (Norco) 5-325 mg per tablet, Take 1 tablet by mouth every 6 (six) hours as needed for pain for up to 10 days Max Daily Amount: 4 tablets, Disp: 30 tablet, Rfl: 0    methocarbamol (ROBAXIN) 500 mg tablet, Take 1 tablet (500 mg total) by mouth 3 (three) times a day as needed for muscle spasms, Disp: 60 tablet, Rfl: 0    Omega 3 1200 MG CAPS, Take by mouth in the morning, Disp: , Rfl:     enoxaparin (LOVENOX) 40 mg/0.4 mL, Inject 0.4 mL (40 mg total) under the skin daily for 28 days To start postoperatively, Disp: 11.2 mL, Rfl: 0    Allergies   Allergen Reactions    Other Nausea Only and Headache     Chemicals and perfumes    Wheat Bran - Food Allergy Diarrhea     \"just wheat\"          There were no vitals filed for this visit.    Objective:  Physical exam  General: Awake, Alert, Oriented  Eyes: Pupils equal, round and reactive to light  Heart: regular rate and rhythm  Lungs: No audible wheezing  Abdomen: soft                    Ortho Exam  Right knee:  Incision clean dry and intact  Staples well approximated and removed today   No erythema and no ecchymosis  Appropriate swelling of lower limb  Appropriate warmth of " "knee  Extensor mechanism intact  Extension 10  Flexion 80  Calf compartments soft and supple  Sensation intact  Toes are warm sensate and mobile      Diagnostics, reviewed and taken today if performed as documented:    The attending physician has personally reviewed the pertinent films in PACS and interpretation is as follows:  Right knee x-ray:  Well aligned prosthesis with no acute changes.      Procedures, if performed today:    Procedures    None performed      Portions of the record may have been created with voice recognition software.  Occasional wrong word or \"sound a like\" substitutions may have occurred due to the inherent limitations of voice recognition software.  Read the chart carefully and recognize, using context, where substitutions have occurred.    "

## 2025-01-17 NOTE — TELEPHONE ENCOUNTER
Caller: Maria Teresa [facility]    Doctor/Office: Flor/Cadence    CB#: [phone]      What needs to be faxed: KANIKA 1/17    ATTN to: Maria Teresa    Fax#: [phone]      Documents were successfully e-faxed

## 2025-01-20 ENCOUNTER — TELEPHONE (OUTPATIENT)
Dept: OBGYN CLINIC | Facility: HOSPITAL | Age: 75
End: 2025-01-20

## 2025-01-20 NOTE — TELEPHONE ENCOUNTER
Caller: Maria Teresa- [facility]    Doctor: Dr. Ray    Reason for call:     Surgery: 01/06/25  robotically assisted right total knee arthroplasty, all associated procedures as indicated (Right: Knee)     Maria Teresa does physical therapy with the patient. She said there is nothing in the office note stating when she can start to sleep on her side. Patient is complaining about severe back pain due to having to lay flat. Wondering when she can start to sleep on her side?    Call back#: [phone]

## 2025-01-20 NOTE — TELEPHONE ENCOUNTER
"Outgoing Call to: Maria Teresa- [facility]    Call back#: [phone]      Doctor: Dr. Ray     Reason for call:      Surgery: 01/06/25  robotically assisted right total knee arthroplasty, all associated procedures as indicated (Right: Knee)      I called back Maria Teresa and advised per Dr. Ray, \"This patient may sleep in any position that she finds comfortable.\"    Maria Teresa verbalized understanding and she will let the patient know.       "

## 2025-02-14 ENCOUNTER — OFFICE VISIT (OUTPATIENT)
Dept: OBGYN CLINIC | Facility: MEDICAL CENTER | Age: 75
End: 2025-02-14

## 2025-02-14 VITALS — HEIGHT: 68 IN | WEIGHT: 173 LBS | BODY MASS INDEX: 26.22 KG/M2

## 2025-02-14 DIAGNOSIS — Z96.651 AFTERCARE FOLLOWING RIGHT KNEE JOINT REPLACEMENT SURGERY: Primary | ICD-10-CM

## 2025-02-14 DIAGNOSIS — Z47.1 AFTERCARE FOLLOWING RIGHT KNEE JOINT REPLACEMENT SURGERY: Primary | ICD-10-CM

## 2025-02-14 PROCEDURE — 99024 POSTOP FOLLOW-UP VISIT: CPT | Performed by: ORTHOPAEDIC SURGERY

## 2025-02-14 NOTE — PROGRESS NOTES
Assessment:  1. Aftercare following right knee joint replacement surgery            Plan:  6 weeks s/p right TKA with robot, 1/6/2025.   She is doing well.    She should continue home physical therapy.    She should follow up in 6 weeks      To do next visit:  Return in about 6 weeks (around 3/28/2025).    The above stated was discussed in layman's terms and the patient expressed understanding.  All questions were answered to the patient's satisfaction.       Scribe Attestation      I,:  Josh Jain MA am acting as a scribe while in the presence of the attending physician.:       I,:  Jethro Ray MD personally performed the services described in this documentation    as scribed in my presence.:               Subjective:   Anisa Rowe is a 74 y.o. female who presents 6 weeks s/p right TKA with robot, 1/6/2025.  She is progressing.  Today she has improving right generalized knee soreness.  Walking can be difficult at times.  She does home physical therapy with benefit.  He denies fever, chills or shortness of breath.        Review of systems negative unless otherwise specified in HPI    Past Medical History:   Diagnosis Date    Arthritis     stiff knees    Exercises daily     Female bladder prolapse     VEC today 11/16/2020     pelvic repair 3/2020    Hyperlipidemia     borderline    Hypoglycemia     Kidney stone     Lyme disease     3 years ago    Migraine     not recent    Osteoporosis     borderline    Urinary incontinence     at times    Urinary urgency     Uterine prolapse     Wears dentures     bridge lower    Wears glasses        Past Surgical History:   Procedure Laterality Date    [place] ARTHRP ACETBLR/PROX FEM PROSTC AGRFT/ALGRFT Left 4/29/2024    Procedure: ARTHROPLASTY HIP TOTAL;  Surgeon: Jethro Ray MD;  [facility];  Service: Orthopedics    [place] ARTHRP KNE CONDYLE&PLATU MEDIAL&LAT COMPARTMENTS Right 1/6/2025    Procedure: robotically assisted right total knee arthroplasty,  "all associated procedures as indicated;  Surgeon: Jethro Ray MD;  [facility];  Service: Orthopedics    [place] CMBND ANTERPOST COLPORRAPHY W/CYSTO N/A 11/16/2020    Procedure: ANT COLPORRHAPHY;  Surgeon: Jerrell Zhu MD;  [facility];  Service: UroGynecology           [place] COLPOPEXY VAGINAL EXTRAPERITONEAL APPROACH N/A 3/9/2020    Procedure: V.E.C.(ENPLACE);  Surgeon: Jerrell Zhu MD;  [facility];  Service: UroGynecology           [place] COLPOPEXY VAGINAL EXTRAPERITONEAL APPROACH N/A 11/16/2020    Procedure: V.E.C. w/ enplace;  Surgeon: Jerrell Zhu MD;  [facility];  Service: UroGynecology           MA CYSTOURETHROSCOPY N/A 3/9/2020    Procedure: CYSTOSCOPY;  Surgeon: Jerrell Zhu MD;  [facility];  Service: UroGynecology           MA CYSTOURETHROSCOPY N/A 11/16/2020    Procedure: CYSTOSCOPY;  Surgeon: Jerrell Zhu MD;  [facility];  Service: UroGynecology           [place] POST COLPORRHAPHY RECTOCELE W/WO PERINEORRHAPHY N/A 3/9/2020    Procedure: ANTERIOR AND POST COLPORRHAPHY;  Surgeon: Jerrell Zhu MD;  [facility];  Service: UroGynecology           [place] SLING OPERATION STRESS INCONTINENCE N/A 3/9/2020    Procedure: SINGLE INCISION SLING;  Surgeon: Jerrell Zhu MD;  [facility];  Service: UroGynecology           VAGINAL DELIVERY      \"47 yrs ago did have sedation\"    WISDOM TOOTH EXTRACTION         Family History   Problem Relation Age of Onset    Osteoporosis Mother     Stroke Father     Diabetes Father     Diabetes Sister        Social History     Occupational History    Occupation:      Comment: retired   Tobacco Use    Smoking status: Never    Smokeless tobacco: Never   Vaping Use    Vaping status: Never Used   Substance and Sexual Activity    Alcohol use: Never    Drug use: Never    Sexual activity: Not Currently         Current Outpatient Medications:     acetaminophen (TYLENOL) 500 mg tablet, Take 2 " "tablets (1,000 mg total) by mouth every 6 (six) hours as needed for mild pain, Disp: 90 tablet, Rfl: 0    Bromelains (BROMELAIN PO), Take by mouth in the morning, Disp: , Rfl:     cholecalciferol (VITAMIN D3) 1,000 units tablet, Take 1,000 Units by mouth daily, Disp: , Rfl:     methocarbamol (ROBAXIN) 500 mg tablet, Take 1 tablet (500 mg total) by mouth 3 (three) times a day as needed for muscle spasms, Disp: 60 tablet, Rfl: 0    Omega 3 1200 MG CAPS, Take by mouth in the morning, Disp: , Rfl:     enoxaparin (LOVENOX) 40 mg/0.4 mL, Inject 0.4 mL (40 mg total) under the skin daily for 28 days To start postoperatively, Disp: 11.2 mL, Rfl: 0    Allergies   Allergen Reactions    Other Nausea Only and Headache     Chemicals and perfumes    Wheat Bran - Food Allergy Diarrhea     \"just wheat\"          There were no vitals filed for this visit.    Objective:  Physical exam  General: Awake, Alert, Oriented  Eyes: Pupils equal, round and reactive to light  Heart: regular rate and rhythm  Lungs: No audible wheezing  Abdomen: soft                    Ortho Exam  Right knee:  Well healed anterior incision  No erythema and no ecchymosis  Appropriate swelling of lower limb  Appropriate warmth of knee  Extensor mechanism intact  Extension 10  Flexion 100  Calf compartments soft and supple  Sensation intact  Toes are warm sensate and mobile      Diagnostics, reviewed and taken today if performed as documented:    None performed     Procedures, if performed today:    Procedures    None performed      Portions of the record may have been created with voice recognition software.  Occasional wrong word or \"sound a like\" substitutions may have occurred due to the inherent limitations of voice recognition software.  Read the chart carefully and recognize, using context, where substitutions have occurred.    "

## 2025-02-17 ENCOUNTER — TELEPHONE (OUTPATIENT)
Age: 75
End: 2025-02-17

## 2025-02-17 NOTE — TELEPHONE ENCOUNTER
Caller: Maria Teresa [facility]    Doctor: Dr. Ray    Reason for call: Please fax PT referral to [phone]      
Faxed to number provided.  
14-Mar-2024

## 2025-02-28 ENCOUNTER — TELEPHONE (OUTPATIENT)
Dept: NEUROLOGY | Facility: CLINIC | Age: 75
End: 2025-02-28

## 2025-02-28 NOTE — TELEPHONE ENCOUNTER
Patient came into office requesting that I email her and her 's Freddy White's income verification to Kapil Castillo.

## 2025-03-28 ENCOUNTER — OFFICE VISIT (OUTPATIENT)
Dept: OBGYN CLINIC | Facility: MEDICAL CENTER | Age: 75
End: 2025-03-28

## 2025-03-28 ENCOUNTER — APPOINTMENT (OUTPATIENT)
Dept: RADIOLOGY | Facility: MEDICAL CENTER | Age: 75
End: 2025-03-28
Payer: COMMERCIAL

## 2025-03-28 VITALS — HEIGHT: 68 IN | BODY MASS INDEX: 28.31 KG/M2 | WEIGHT: 186.8 LBS

## 2025-03-28 DIAGNOSIS — Z47.1 AFTERCARE FOLLOWING RIGHT KNEE JOINT REPLACEMENT SURGERY: ICD-10-CM

## 2025-03-28 DIAGNOSIS — Z47.1 AFTERCARE FOLLOWING RIGHT KNEE JOINT REPLACEMENT SURGERY: Primary | ICD-10-CM

## 2025-03-28 DIAGNOSIS — Z96.651 AFTERCARE FOLLOWING RIGHT KNEE JOINT REPLACEMENT SURGERY: Primary | ICD-10-CM

## 2025-03-28 DIAGNOSIS — Z96.651 AFTERCARE FOLLOWING RIGHT KNEE JOINT REPLACEMENT SURGERY: ICD-10-CM

## 2025-03-28 PROCEDURE — 99024 POSTOP FOLLOW-UP VISIT: CPT | Performed by: ORTHOPAEDIC SURGERY

## 2025-03-28 PROCEDURE — 73560 X-RAY EXAM OF KNEE 1 OR 2: CPT

## 2025-03-28 NOTE — PROGRESS NOTES
Name: Anisa Rowe      : 1950       MRN: [id number]   Encounter Provider: Jethro Ray MD   Encounter Date: 25  Encounter department: [facility] [place]     ASSESSMENT & PLAN:  Assessment & Plan  Aftercare following right knee joint replacement surgery  3 months s/p right TKA with robot, 2025.   She is doing well.    She should continue home physical therapy.    Discussed with patient she can kneel while in the garden if she would like to, symptoms will be her guide.   Left hip pain will resolve in time, this is coming from her altered gait from TKA.   Continue with home exercises and ice.   Follow up 3 months      Orders:    XR knee 1 or 2 vw right; Future    ___________________________________________________      To do next visit:  Return in about 3 months (around 2025).  ____________________________________________________  CHIEF COMPLAINT:  Chief Complaint   Patient presents with    Right Knee - Post-op     SUBJECTIVE:  Anisa Rowe is a 74 y.o. female who presents  approximately 3 months s/p right TKA with robot, 2025. She feels she is healing well post operatively, has been receiving home therapy and wishes to be able to extend this into may. Her pain has improved however she feels her knee is stiff and lacks to ability to fully extend her knee. She is using a cane to assist with ambulation. Patient will ice her knee a couple times a day, and is no longer taking pain medication or nsaids. She feels her energy level has decreased a lot since the surgery, is not sure if it is from her allergies. She's been noticing discomfort to the lateral aspect of her hip, which is approximately on year s/p YONATAN. Denies instability or giving way.     PAST MEDICAL HISTORY:  Past Medical History:   Diagnosis Date    Arthritis     stiff knees    Exercises daily     Female bladder prolapse     VEC today 2020     pelvic repair 3/2020    Hyperlipidemia      borderline    Hypoglycemia     Kidney stone     Lyme disease     3 years ago    Migraine     not recent    Osteoporosis     borderline    Urinary incontinence     at times    Urinary urgency     Uterine prolapse     Wears dentures     bridge lower    Wears glasses        PAST SURGICAL HISTORY:  Past Surgical History:   Procedure Laterality Date    [place] ARTHRP ACETBLR/PROX FEM PROSTC AGRFT/ALGRFT Left 4/29/2024    Procedure: ARTHROPLASTY HIP TOTAL;  Surgeon: Jethro Ray MD;  [facility];  Service: Orthopedics    TN ARTHRP KNE CONDYLE&PLATU MEDIAL&LAT COMPARTMENTS Right 1/6/2025    Procedure: robotically assisted right total knee arthroplasty, all associated procedures as indicated;  Surgeon: Jethro Ray MD;  [facility];  Service: Orthopedics    TN CMBND ANTERPOST COLPORRAPHY W/CYSTO N/A 11/16/2020    Procedure: ANT COLPORRHAPHY;  Surgeon: Jerrell Zhu MD;  [facility];  Service: UroGynecology           TN COLPOPEXY VAGINAL EXTRAPERITONEAL APPROACH N/A 3/9/2020    Procedure: V.E.C.(ENPLACE);  Surgeon: Jerrell Zhu MD;  [facility];  Service: UroGynecology           TN COLPOPEXY VAGINAL EXTRAPERITONEAL APPROACH N/A 11/16/2020    Procedure: V.E.C. w/ enplace;  Surgeon: Jerrell Zhu MD;  [facility];  Service: UroGynecology           TN CYSTOURETHROSCOPY N/A 3/9/2020    Procedure: CYSTOSCOPY;  Surgeon: Jerrell Zhu MD;  [facility];  Service: UroGynecology           TN CYSTOURETHROSCOPY N/A 11/16/2020    Procedure: CYSTOSCOPY;  Surgeon: Jerrell Zhu MD;  [facility];  Service: UroGynecology           TN POST COLPORRHAPHY RECTOCELE W/WO PERINEORRHAPHY N/A 3/9/2020    Procedure: ANTERIOR AND POST COLPORRHAPHY;  Surgeon: Jerrell Zhu MD;  [facility];  Service: UroGynecology           TN SLING OPERATION STRESS INCONTINENCE N/A 3/9/2020    Procedure: SINGLE INCISION SLING;  Surgeon: Jerrell Zhu MD;   "Location: [facility];  Service: UroGynecology           VAGINAL DELIVERY      \"47 yrs ago did have sedation\"    WISDOM TOOTH EXTRACTION         FAMILY HISTORY:  Family History   Problem Relation Age of Onset    Osteoporosis Mother     Stroke Father     Diabetes Father     Diabetes Sister        SOCIAL HISTORY:  Social History     Tobacco Use    Smoking status: Never    Smokeless tobacco: Never   Vaping Use    Vaping status: Never Used   Substance Use Topics    Alcohol use: Never    Drug use: Never       MEDICATIONS:    Current Outpatient Medications:     acetaminophen (TYLENOL) 500 mg tablet, Take 2 tablets (1,000 mg total) by mouth every 6 (six) hours as needed for mild pain, Disp: 90 tablet, Rfl: 0    Bromelains (BROMELAIN PO), Take by mouth in the morning, Disp: , Rfl:     cholecalciferol (VITAMIN D3) 1,000 units tablet, Take 1,000 Units by mouth daily, Disp: , Rfl:     methocarbamol (ROBAXIN) 500 mg tablet, Take 1 tablet (500 mg total) by mouth 3 (three) times a day as needed for muscle spasms, Disp: 60 tablet, Rfl: 0    Omega 3 1200 MG CAPS, Take by mouth in the morning, Disp: , Rfl:     enoxaparin (LOVENOX) 40 mg/0.4 mL, Inject 0.4 mL (40 mg total) under the skin daily for 28 days To start postoperatively, Disp: 11.2 mL, Rfl: 0    ALLERGIES:  Allergies   Allergen Reactions    Other Nausea Only and Headache     Chemicals and perfumes    Wheat Bran - Food Allergy Diarrhea     \"just wheat\"       LABS:  HgA1c:   Lab Results   Component Value Date    HGBA1C 5.6 12/10/2024     BMP:   Lab Results   Component Value Date    CALCIUM 9.2 01/08/2025    K 3.8 01/08/2025    CO2 28 01/08/2025     01/08/2025    BUN 14 01/08/2025    CREATININE 0.65 01/08/2025     CBC: No components found for: \"CBC\"    _____________________________________________________  PHYSICAL EXAMINATION:  Vital signs: Ht 5' 8\" (1.727 m)   Wt 84.7 kg (186 lb 12.8 oz)   BMI 28.40 kg/m²   General: No acute distress, awake and alert  Psychiatric: " Mood and affect appear appropriate  HEENT: Trachea Midline, No torticollis, no apparent facial trauma  Cardiovascular: No audible murmurs; Extremities appear perfused  Pulmonary: No audible wheezing or stridor  Skin: Intact, no open lesions; see further details (if any) below    MUSCULOSKELETAL EXAMINATION:    Ortho Exam  Right Knee  Healed incision line, normal post operative swelling   Straight alignment   Range of motion from 6 to 115.    The patient is able to perform a straight leg raise.    Varus stress testing reveals stable at 0 and 30 degrees   Valgus stress testing reveals stable at 0 and 30 degrees  The patient is neurovascular intact distally.       _____________________________________________________  STUDIES REVIEWED:    The attending physician has personally reviewed the pertinent films in PACS and their interpretation is as follows:  X-rays of right knee demonstrates hardware in adequate position with no evidence of loosening.     PROCEDURES PERFORMED:    Procedures    None Preformed        Scribe Attestation      I,:  Sara Conner am acting as a scribe while in the presence of the attending physician.:       I,:  Jethro Ray MD personally performed the services described in this documentation    as scribed in my presence.:

## 2025-03-31 ENCOUNTER — TELEPHONE (OUTPATIENT)
Age: 75
End: 2025-03-31

## 2025-03-31 DIAGNOSIS — Z96.642 AFTERCARE FOLLOWING LEFT HIP JOINT REPLACEMENT SURGERY: Primary | ICD-10-CM

## 2025-03-31 DIAGNOSIS — Z47.1 AFTERCARE FOLLOWING LEFT HIP JOINT REPLACEMENT SURGERY: Primary | ICD-10-CM

## 2025-03-31 NOTE — TELEPHONE ENCOUNTER
Called and spoke with Maria Teresa from Home Health, she feels she would benefit from outpatient therapy for more ROM. They feel they are exhausting their efforts due to lack of equipment. Her insurance will only pay up until the 2nd week in May, so she is requesting for you to update a PT order form specifically for Outpatient instead of home therapy, so she can start looking now and make the smooth transition after May.

## 2025-03-31 NOTE — TELEPHONE ENCOUNTER
Caller: Maria Teresa - [facility]     Doctor: Dr. Ray    Reason for call: calling to ask if patient should stay on weekly PT or switch to biweekly. Also states patient is due to be discharged from PT in May, and is wondering if that is okay since patient is not due to follow up with Dr. Ray until June    Call back#: [phone]

## 2025-04-01 NOTE — TELEPHONE ENCOUNTER
Thank you Fabiola, no further actions, when patient is ready to transition to outpatient therapy, the order will be in chart.

## 2025-04-22 ENCOUNTER — TELEPHONE (OUTPATIENT)
Age: 75
End: 2025-04-22

## 2025-04-22 DIAGNOSIS — Z96.651 AFTERCARE FOLLOWING RIGHT KNEE JOINT REPLACEMENT SURGERY: Primary | ICD-10-CM

## 2025-04-22 DIAGNOSIS — Z47.1 AFTERCARE FOLLOWING RIGHT KNEE JOINT REPLACEMENT SURGERY: Primary | ICD-10-CM

## 2025-04-22 NOTE — TELEPHONE ENCOUNTER
Caller: Maria Teresa [facility]     Doctor: Dr. Ray    Reason for call: Maria Teresa states starting May 8 the patient will need to start out patient physical therapy as she will be released from [facility]. Please upload referral into [email or web address].     Call back#: [phone]

## 2025-05-08 ENCOUNTER — EVALUATION (OUTPATIENT)
Dept: PHYSICAL THERAPY | Facility: CLINIC | Age: 75
End: 2025-05-08
Attending: ORTHOPAEDIC SURGERY
Payer: COMMERCIAL

## 2025-05-08 DIAGNOSIS — Z47.1 AFTERCARE FOLLOWING RIGHT KNEE JOINT REPLACEMENT SURGERY: ICD-10-CM

## 2025-05-08 DIAGNOSIS — Z96.651 AFTERCARE FOLLOWING RIGHT KNEE JOINT REPLACEMENT SURGERY: ICD-10-CM

## 2025-05-08 PROCEDURE — 97110 THERAPEUTIC EXERCISES: CPT | Performed by: PHYSICAL THERAPIST

## 2025-05-08 PROCEDURE — 97161 PT EVAL LOW COMPLEX 20 MIN: CPT | Performed by: PHYSICAL THERAPIST

## 2025-05-08 NOTE — HOME EXERCISE EDUCATION
Program_ID:[id number]   Access Code: [email or web address]  URL: [email or web address]  Date: 05-  Prepared By: Guido Cartwright    Program Notes      Exercises      - Seated Knee Extension Stretch with Chair - 3 x daily - 7 x weekly -  sets - 4 reps - 30 hold

## 2025-05-08 NOTE — PROGRESS NOTES
PT Evaluation     Today's date: 2025  Patient name: Anisa Rowe  : 1950  MRN: [id number]  Referring provider: Jethro Ray,*  Dx:   Encounter Diagnosis     ICD-10-CM    1. Aftercare following right knee joint replacement surgery  Z47.1 Ambulatory Referral to Physical Therapy    Z96.651                      Assessment    Assessment details: Patient is a 74-year-old female presenting to physical therapy status post right total knee replacement from 2025.  Surgical scar is closed without any exudate or any abnormal redness.  She does have bilateral pitting edema in the lower extremities but has a negative Homans' sign.  I asked her if her physicians were aware of the swelling for which she confirmed they were.  She has patellar hypomobility in both an inferior and superior direction.  She has high symptom irritability when trying to stretch into extension, not being able to tolerate even gentle pressure into extension.  She also demonstrates diffuse weakness throughout the knee in both flexion and extension.  High level of education was provided to the patient regarding the importance of extension for functional ambulation and described to her at length ways to try to improve extension range of motion with stretching at home including with low load long duration approaches.  She is going to bring her home exercise program that she is currently doing from her home health physical therapist at her next appointment.  Patient would benefit from skilled physical therapy services to address her impairments and maximize function.    Goals  Short-term goals-4 weeks  1.  Increase extension range of motion by at least 5 degrees  2.  Increase flexion range of motion by at least 5 degrees  Long term goals-12 weeks  1.  Increase right knee flexion and extension strength to at least 4 out of 5  2.  Able to complete gardening activities without functional limitation  3.  Able to go up and down a  "flight of stairs without functional limitation  4.  Able to manage symptoms independently    Plan  Planned modality interventions: cryotherapy    Planned therapy interventions: manual therapy, strengthening, stretching, therapeutic exercise, IADL retraining, home exercise program, activity modification, balance, neuromuscular re-education and therapeutic activities    Frequency: 2x month  Duration in weeks: 8  Plan of Care expiration date: 7/3/2025      Subjective Evaluation    History of Present Illness  Mechanism of injury: Patient underwent a R TKR on 25.  She has been having home PT since surgery, last home PT appointment was on 25.  She reports that she continues to have ongoing stiffness in the knee along with ongoing weakness in the knee.  She is diligent with performing her HEP and notes that her exercises do help with her stiffness.  If she is sitting for a long period of time, she reports that she gets very stiff.  Movement does help to loosen the knee for her.  She currently states that her feet feel \"weird\", she doesn't know how to describe it.  Does note that her feet feel tight on the dorsal aspect of the foot.  Her feet do get swollen from time to time.  Pain is relatively well controlled, stiffness is her biggest complain.  She does continue to report feeling very tired, takes many naps throughout the day.  She is still sleeping on the couch.  Reports that she is getting about 4-5 hours of sleep at a shot.    Patient Goals  Patient goals for therapy: increased strength, independence with ADLs/IADLs, return to sport/leisure activities and increased motion    Pain  Current pain ratin  At worst pain ratin        Objective     Palpation     Additional Palpation Details  Diffuse tenderness to palpation throughout     Active Range of Motion     Right Knee   Flexion: 105 degrees   Extension: 20 degrees     Passive Range of Motion     Right Knee   Flexion: 110 degrees   Prone flexion: 18 " degrees     Additional Passive Range of Motion Details  High pain levels while trying to passively move the patient into extension.      Mobility   Patellar Mobility:     Right Knee   Hypomobile: superior and inferior     Strength/Myotome Testing     Right Knee   Flexion: 4-  Extension: 4-  Quadriceps contraction: fair    Ambulation     Comments   Lack of TKE present during ambulation.  Use of walking stick present    General Comments:      Knee Comments  Bilateral pitting edema present   (-) Chicho sign             Precautions: L hip THR, R TKR 1/6/25, osteopenia    Access Code: [email or web address]  URL: [email or web address]  Date: 05/08/2025  Prepared by: Guido Cartwright      POC expires Unit limit Auth Expiration date PT/OT/ST + Visit Limit?   7/3 4 8/8/25 BOMN                           Visit/Unit Tracking  AUTH Status:  Date 5/8               Used 1               Remaining                      Manuals 5/8            KNEE PROM Extension focus today GM            Patellar mobilizations Inferior GM                                      Neuro Re-Ed                                                                                                        Ther Ex             Knee extension LLLD 2 MIN                                                                                                       Ther Activity                                       Gait Training                                       Modalities

## 2025-05-09 ENCOUNTER — TELEPHONE (OUTPATIENT)
Dept: UROLOGY | Facility: CLINIC | Age: 75
End: 2025-05-09

## 2025-05-09 DIAGNOSIS — R31.29 MICROHEMATURIA: Primary | ICD-10-CM

## 2025-05-09 NOTE — TELEPHONE ENCOUNTER
Anisa Rowe MRN [id number]- 2-4 RBC from UA from 8/8/24   Cysto should be canceled. Please explain to the patient that national guidelines regarding blood in urine have since been updated and instead of the scope procedure, she needs a repeat urine test (UA micro). She should instead be scheduled for an AP visit within the next few weeks with UA micro done beforehand. If this shows persistent microscopic hematuria, then she can be considered for non-emergent cystoscopy in the future.

## 2025-05-13 ENCOUNTER — OFFICE VISIT (OUTPATIENT)
Dept: PHYSICAL THERAPY | Facility: CLINIC | Age: 75
End: 2025-05-13
Attending: ORTHOPAEDIC SURGERY
Payer: COMMERCIAL

## 2025-05-13 DIAGNOSIS — Z47.1 AFTERCARE FOLLOWING RIGHT KNEE JOINT REPLACEMENT SURGERY: Primary | ICD-10-CM

## 2025-05-13 DIAGNOSIS — Z96.651 AFTERCARE FOLLOWING RIGHT KNEE JOINT REPLACEMENT SURGERY: Primary | ICD-10-CM

## 2025-05-13 PROCEDURE — 97530 THERAPEUTIC ACTIVITIES: CPT | Performed by: PHYSICAL THERAPIST

## 2025-05-13 PROCEDURE — 97110 THERAPEUTIC EXERCISES: CPT | Performed by: PHYSICAL THERAPIST

## 2025-05-13 NOTE — TELEPHONE ENCOUNTER
L/m for pt to c/b for Dr. Poon's message-   Basically he would like to cancel the cysto appt and  have patient  have a UA and then a f/u with the AP -

## 2025-05-13 NOTE — PROGRESS NOTES
"Daily Note     Today's date: 2025  Patient name: Anisa Rowe  : 1950  MRN: [id number]  Referring provider: Jethro Ray,*  Dx:   Encounter Diagnosis     ICD-10-CM    1. Aftercare following right knee joint replacement surgery  Z47.1     Z96.651                      Subjective: Patient had the opportunity to try some of her stretching at home.  Reports stiffness this morning.        Objective: See treatment diary below      Assessment: Stiffness was present today with a ROM  degrees.  Passively, she was able to get to 15 degrees of extension and 110 degrees of flexion.  Empty end feel present with both with pain as the biggest limiting factor.  Updated HEP for patient today to address quad deficits and ROM deficits.        Plan: Continue per plan of care.      Precautions: L hip THR, R TKR 25, osteopenia    Access Code: [email or web address]  URL: [email or web address]  Date: 2025  Prepared by: Guido Cartwright      POC expires Unit limit Auth Expiration date PT/OT/ST + Visit Limit?   7/3 4 25 BOMN                           Visit/Unit Tracking  AUTH Status:  Date                Used 1               Remaining                      Manuals            KNEE PROM Extension focus today GM gm           Patellar mobilizations Inferior GM                                      Neuro Re-Ed                                                                                                        Ther Ex             Knee extension LLLD 2 MIN            Bike  6 min ROM           LAQ  5# 3X10           SAQ  20X           Heel slides  10\"x10           HSS  10\"X10                                     Ther Activity             STS  2X10 1 foam pad           Step up  4\" 20x with TKE           Gait Training                                       Modalities                                            "

## 2025-05-13 NOTE — HOME EXERCISE EDUCATION
Program_ID:[id number]   Access Code: [email or web address]  URL: [email or web address]  Date: 05-  Prepared By: Guido Cartwright    Program Notes      Exercises      - Seated Knee Extension Stretch with Chair - 3 x daily - 7 x weekly -  sets - 4 reps - 30 hold      - Supine Heel Slide with Strap - 1 x daily - 7 x weekly - 1 sets - 10 reps - 10 hold      - Seated Long Arc Quad - 1 x daily - 7 x weekly - 3 sets - 10 reps      - Sit to Stand - 1 x daily - 7 x weekly - 3 sets - 10 reps      - Supine Hamstring Stretch with Strap - 1 x daily - 7 x weekly -  sets - 10 reps - 10 hold

## 2025-05-15 ENCOUNTER — APPOINTMENT (OUTPATIENT)
Dept: PHYSICAL THERAPY | Facility: CLINIC | Age: 75
End: 2025-05-15
Attending: ORTHOPAEDIC SURGERY
Payer: COMMERCIAL

## 2025-05-15 ENCOUNTER — TELEPHONE (OUTPATIENT)
Age: 75
End: 2025-05-15

## 2025-05-15 NOTE — TELEPHONE ENCOUNTER
Patient called to confirm which test was wanted. Relayed the order to her. Patient verbalized understanding.

## 2025-05-15 NOTE — TELEPHONE ENCOUNTER
Patient was calling to see if the orders for the urine testing was added to the chart.     Confirmed Malena ordered it for her.     Patient also wanted to know the name of the procedure she cancelled. Let her know it was a Cysto.

## 2025-05-19 ENCOUNTER — OFFICE VISIT (OUTPATIENT)
Dept: PHYSICAL THERAPY | Facility: CLINIC | Age: 75
End: 2025-05-19
Attending: ORTHOPAEDIC SURGERY
Payer: COMMERCIAL

## 2025-05-19 DIAGNOSIS — Z47.1 AFTERCARE FOLLOWING RIGHT KNEE JOINT REPLACEMENT SURGERY: Primary | ICD-10-CM

## 2025-05-19 DIAGNOSIS — Z96.651 AFTERCARE FOLLOWING RIGHT KNEE JOINT REPLACEMENT SURGERY: Primary | ICD-10-CM

## 2025-05-19 NOTE — PROGRESS NOTES
Patient came into the office today to discuss her care with me.  She does report that she was tired and sore for about 2 days following the last visit, feels this is because of the sit to stand exercise.  She explained to me that she is quite busy at home with taking care of her  and managing their house responsibilities.  As a result, coming to outpatient therapy has become too much for her.  She is requesting to manage her symptoms at home with HEP and then have a check up in one month time.  I agreed upon this with the patient, emphasized the importance of HEP compliance to continue to address ROM and strength deficits.

## 2025-05-22 ENCOUNTER — APPOINTMENT (OUTPATIENT)
Dept: PHYSICAL THERAPY | Facility: CLINIC | Age: 75
End: 2025-05-22
Attending: ORTHOPAEDIC SURGERY
Payer: COMMERCIAL

## 2025-05-27 ENCOUNTER — APPOINTMENT (OUTPATIENT)
Dept: PHYSICAL THERAPY | Facility: CLINIC | Age: 75
End: 2025-05-27
Attending: ORTHOPAEDIC SURGERY
Payer: COMMERCIAL

## 2025-05-29 ENCOUNTER — APPOINTMENT (OUTPATIENT)
Dept: PHYSICAL THERAPY | Facility: CLINIC | Age: 75
End: 2025-05-29
Attending: ORTHOPAEDIC SURGERY
Payer: COMMERCIAL

## 2025-06-18 ENCOUNTER — OFFICE VISIT (OUTPATIENT)
Dept: PHYSICAL THERAPY | Facility: CLINIC | Age: 75
End: 2025-06-18
Attending: ORTHOPAEDIC SURGERY
Payer: COMMERCIAL

## 2025-06-18 DIAGNOSIS — Z47.1 AFTERCARE FOLLOWING RIGHT KNEE JOINT REPLACEMENT SURGERY: Primary | ICD-10-CM

## 2025-06-18 DIAGNOSIS — Z96.651 AFTERCARE FOLLOWING RIGHT KNEE JOINT REPLACEMENT SURGERY: Primary | ICD-10-CM

## 2025-06-18 PROCEDURE — 97530 THERAPEUTIC ACTIVITIES: CPT | Performed by: PHYSICAL THERAPIST

## 2025-06-18 PROCEDURE — 97110 THERAPEUTIC EXERCISES: CPT | Performed by: PHYSICAL THERAPIST

## 2025-06-18 NOTE — PROGRESS NOTES
PT Re-Evaluation     Today's date: 2025  Patient name: Anisa Rowe  : 1950  MRN: [id number]  Referring provider: Jethro Ray,*  Dx:   Encounter Diagnosis     ICD-10-CM    1. Aftercare following right knee joint replacement surgery  Z47.1     Z96.651                        Assessment    Assessment details: Patient is a 74-year-old female presenting to physical therapy status post right total knee replacement from 2025.  She has been attempting to manage her symptoms at home with her home exercise program due to family circumstances.  She does demonstrate some improvements in both knee flexion and knee extension range of motion since her last assessment in this office.  We spent a large portion of today discussing ways to be more efficient inside the home with movements and also strategies to assist her with going up and down a flight of stairs and getting in and out of her vehicle.  Many of these strategies include continued compliance with her home exercise program as it appears that strength is the biggest impairment that she is facing.  I did provide her with some additional exercises to be performed at home to address her specific strength deficits.  I also advised her that she can increase her overall time of cycling on her stationary bike at home not to exceed in addition of 10 to 15% of time duration week over week.  At this time, I will leave her chart open if she were to have any additional questions but if she does not need to return within the next 30 days she will be discharged from skilled physical therapy to continue to manage her symptoms at home.  Thus far, she is happy with her outcome.    Goals  Short-term goals-4 weeks  1.  Increase extension range of motion by at least 5 degrees - not met  2.  Increase flexion range of motion by at least 5 degrees - met  Long term goals-12 weeks  1.  Increase right knee flexion and extension strength to at least 4 out of 5 -  met  2.  Able to complete gardening activities without functional limitation - not met  3.  Able to go up and down a flight of stairs without functional limitation - not met  4.  Able to manage symptoms independently - met    Plan  Planned modality interventions: cryotherapy    Planned therapy interventions: manual therapy, strengthening, stretching, therapeutic exercise, IADL retraining, home exercise program, activity modification, balance, neuromuscular re-education and therapeutic activities    Frequency: 2x month  Duration in weeks: 4  Plan of Care expiration date: 2025        Subjective Evaluation    History of Present Illness  Mechanism of injury: Patient underwent a R TKR on 25.  She presents today for a follow up after trying to manage her symptoms at home with a HEP.  Pain levels she reports are well controlled, she functionally has the most amount of difficulty going down a flight of stairs and getting out of her car which is low to the ground.  She does report compliance with her HEP, continues to feel weak in the lower extremity.  Does note some stiffness.    Patient Goals  Patient goals for therapy: increased strength, independence with ADLs/IADLs, return to sport/leisure activities and increased motion    Pain  Current pain ratin  At worst pain ratin          Objective     Active Range of Motion     Right Knee   Flexion: 115 degrees   Extension: 15 degrees     Mobility   Patellar Mobility:     Right Knee   Hypomobile: superior and inferior     Strength/Myotome Testing     Right Knee   Flexion: 4-  Extension: 4+  Quadriceps contraction: fair    Ambulation     Comments   Lack of TKE present during ambulation.  Use of walking stick present             Precautions: L hip THR, R TKR 25, osteopenia    Access Code: [email or web address]  URL: [email or web address]  Date: 2025  Prepared by: Guido Cartwright      POC expires Unit limit Auth Expiration date PT/OT/ST + Visit Limit?   7/3  "4 8/8/25 BOMN   7/18                          Manuals 5/8 5/13 6/18          KNEE PROM Extension focus today GM gm           Patellar mobilizations Inferior GM                                      Neuro Re-Ed                                                                                                        Ther Ex             Knee extension LLLD 2 MIN            Bike  6 min ROM           LAQ  5# 3X10           SAQ  20X           Heel slides  10\"x10           HSS  10\"X10           Seated hamstring slide   5x          Education   HEP review                       Ther Activity             STS  2X10 1 foam pad           Mini squat   10x          Education   Going up and down stairs, getting up from low suraces, endurance progression with activities,          Step up  4\" 20x with TKE           Gait Training                                       Modalities                                         "

## 2025-06-19 LAB
BACTERIA URNS QL MICRO: ABNORMAL
GLUCOSE UR QL STRIP: NEGATIVE MG/DL
HGB UR QL STRIP: NEGATIVE MG/DL
KETONES UR QL STRIP: NEGATIVE MG/DL
LEUKOCYTE ESTERASE UR QL STRIP: 500 /UL
MUCOUS THREADS URNS QL MICRO: ABNORMAL
NITRITE UR QL STRIP: POSITIVE
PH UR: 5.5 [PH] (ref 4.5–8)
PROT 24H UR-MRATE: NEGATIVE MG/DL
RBC #/AREA URNS HPF: 0 /HPF (ref 0–2)
SL AMB POCT URINE COMMENT: ABNORMAL
SP GR UR: 1.02 (ref 1–1.03)
SQUAMOUS #/AREA URNS HPF: >10 /LPF (ref 0–5)
TRANS CELLS #/AREA URNS HPF: 6 /LPF (ref 0–1)
WBC #/AREA URNS HPF: >100 /HPF (ref 0–5)

## 2025-06-27 ENCOUNTER — OFFICE VISIT (OUTPATIENT)
Dept: OBGYN CLINIC | Facility: MEDICAL CENTER | Age: 75
End: 2025-06-27
Payer: COMMERCIAL

## 2025-06-27 ENCOUNTER — APPOINTMENT (OUTPATIENT)
Dept: RADIOLOGY | Facility: MEDICAL CENTER | Age: 75
End: 2025-06-27
Attending: ORTHOPAEDIC SURGERY
Payer: COMMERCIAL

## 2025-06-27 VITALS — WEIGHT: 188 LBS | BODY MASS INDEX: 28.49 KG/M2 | HEIGHT: 68 IN

## 2025-06-27 DIAGNOSIS — Z96.651 AFTERCARE FOLLOWING RIGHT KNEE JOINT REPLACEMENT SURGERY: ICD-10-CM

## 2025-06-27 DIAGNOSIS — Z47.1 AFTERCARE FOLLOWING RIGHT KNEE JOINT REPLACEMENT SURGERY: ICD-10-CM

## 2025-06-27 DIAGNOSIS — Z96.651 AFTERCARE FOLLOWING RIGHT KNEE JOINT REPLACEMENT SURGERY: Primary | ICD-10-CM

## 2025-06-27 DIAGNOSIS — Z47.1 AFTERCARE FOLLOWING RIGHT KNEE JOINT REPLACEMENT SURGERY: Primary | ICD-10-CM

## 2025-06-27 PROCEDURE — 73560 X-RAY EXAM OF KNEE 1 OR 2: CPT

## 2025-06-27 PROCEDURE — 99213 OFFICE O/P EST LOW 20 MIN: CPT | Performed by: ORTHOPAEDIC SURGERY

## 2025-06-27 NOTE — PROGRESS NOTES
Name: Anisa Rowe      : 1950       MRN: [id number]   Encounter Provider: Jethro Ray MD   Encounter Date: 25  Encounter department: [facility] [place]     ASSESSMENT & PLAN:  Assessment & Plan  Aftercare following right knee joint replacement surgery  DOS 2025  Orders:    XR knee 1 or 2 vw right; Future         ___________________________________________________  X-rays taken and reviewed, physical exam performed.  Doing very well 6 months status post robotically assisted right total knee arthroplasty.  Educated the importance of quadricep strengthening, demonstrated the office today as well as provided in her AVS.  Total knee precautions with antibiotics as discussed until the 2-year postop le.  Weightbearing and activity as tolerated.  In regards to her left knee known osteoarthritis she is contemplating elective left knee replacement surgery for January.  Recommend follow-up for her left knee in September for further discussion of left TKA.  Otherwise follow-up in 6 months for her 1 year visit of her right TKA with x-rays upon arrival    To do next visit:  Return in about 3 months (around 2025) for re-check left knee, 6 months for right knee with XRs .  ____________________________________________________  CHIEF COMPLAINT:  Chief Complaint   Patient presents with    Right Knee - Follow-up     6 month P.O          SUBJECTIVE:  Anisa Rowe is a 74 y.o. female who presents today for repeat evaluation essentially 6-month status post robotically assisted right total knee arthroplasty, 2025.  Overall she is doing well.  She states that she only had limited postoperative outpatient physical therapy due to scheduling and transportation issues.  However she does do her exercises regularly at home.  She also has a large vegetable and flower garden that she does tend to.  She does have difficulty ambulating stairs especially on the decline.  In regards  "to her left knee known advanced osteoarthritis she does walk stifflegged to avoid pain and has been limping as a result of it.  She is also status post left total hip arthroplasty from April 2024.  She has no left groin or thigh pain.  She is contemplating pursuing elective left total knee arthroplasty for the winter time.           PAST MEDICAL HISTORY:  Past Medical History[1]    PAST SURGICAL HISTORY:  Past Surgical History[2]    FAMILY HISTORY:  Family History[3]    SOCIAL HISTORY:  Social History[4]    MEDICATIONS:  Current Medications[5]    ALLERGIES:  Allergies[6]    LABS:  HgA1c:   Lab Results   Component Value Date    HGBA1C 5.6 12/10/2024     BMP:   Lab Results   Component Value Date    CALCIUM 9.2 01/08/2025    K 3.8 01/08/2025    CO2 28 01/08/2025     01/08/2025    BUN 14 01/08/2025    CREATININE 0.65 01/08/2025     CBC: No components found for: \"CBC\"    _____________________________________________________  PHYSICAL EXAMINATION:  Vital signs: Ht 5' 8\" (1.727 m)   Wt 85.3 kg (188 lb)   BMI 28.59 kg/m²   General: No acute distress, awake and alert  Psychiatric: Mood and affect appear appropriate  HEENT: Trachea Midline, No torticollis, no apparent facial trauma  Cardiovascular: No audible murmurs; Extremities appear perfused  Pulmonary: No audible wheezing or stridor  Skin: Intact, no open lesions; see further details (if any) below    MUSCULOSKELETAL EXAMINATION:    Right Knee Exam     Range of Motion   Extension:  5   Right knee flexion: 115, patella tracks well.     Other   Erythema: absent  Sensation: normal  Swelling: mild  Effusion: no effusion present    Comments:    Intact extensor mechanism with a well-healed anterior incision.  No warmth.  No gross signs of infection.        Left Knee Exam     Tenderness   The patient is experiencing tenderness in the medial joint line.    Range of Motion   Extension:  10   Flexion:  100 (With crepitation and stiffness in flexion)     Other   Erythema: " absent  Sensation: normal  Swelling: mild  Effusion: no effusion present    Comments:    Varus alignment with bony enlargement medially which is tender          _____________________________________________________  STUDIES REVIEWED:    The attending physician has personally reviewed the pertinent films in PACS and their interpretation is as follows:    Right knee x-rays taken and reviewed in the office today and show: Well aligned, positioned, bonded total knee prosthesis without signs of loosening or stress shielding    PROCEDURES PERFORMED:    Procedures    None Performed      Scribe Attestation      I,:  Rick Soria am acting as a scribe while in the presence of the attending physician.:       I,:  Jethro Ray MD personally performed the services described in this documentation    as scribed in my presence.:                [1]   Past Medical History:  Diagnosis Date    Arthritis     stiff knees    Exercises daily     Female bladder prolapse     VEC today 11/16/2020     pelvic repair 3/2020    Hyperlipidemia     borderline    Hypoglycemia     Kidney stone     Lyme disease     3 years ago    Migraine     not recent    Osteoporosis     borderline    Urinary incontinence     at times    Urinary urgency     Uterine prolapse     Wears dentures     bridge lower    Wears glasses    [2]   Past Surgical History:  Procedure Laterality Date    AL ARTHRP ACETBLR/PROX FEM PROSTC AGRFT/ALGRFT Left 4/29/2024    Procedure: ARTHROPLASTY HIP TOTAL;  Surgeon: Jethro Ray MD;  Location: [facility] OR;  Service: Orthopedics    AL ARTHRP KNE CONDYLE&PLATU MEDIAL&LAT COMPARTMENTS Right 1/6/2025    Procedure: robotically assisted right total knee arthroplasty, all associated procedures as indicated;  Surgeon: Jethro Ray MD;  Location: [facility] OR;  Service: Orthopedics    AL CMBND ANTERPOST COLPORRAPHY W/CYSTO N/A 11/16/2020    Procedure: ANT COLPORRHAPHY;  Surgeon: Jerrell Zhu MD;  Location: [facility]  "OR;  Service: UroGynecology           DE COLPOPEXY VAGINAL EXTRAPERITONEAL APPROACH N/A 3/9/2020    Procedure: V.E.C.(ENPLACE);  Surgeon: Jerrell Zhu MD;  [facility];  Service: UroGynecology           DE COLPOPEXY VAGINAL EXTRAPERITONEAL APPROACH N/A 11/16/2020    Procedure: V.E.C. w/ enplace;  Surgeon: Jerrell Zhu MD;  [facility];  Service: UroGynecology           DE CYSTOURETHROSCOPY N/A 3/9/2020    Procedure: CYSTOSCOPY;  Surgeon: Jerrell Zhu MD;  [facility];  Service: UroGynecology           DE CYSTOURETHROSCOPY N/A 11/16/2020    Procedure: CYSTOSCOPY;  Surgeon: Jerrell Zhu MD;  [facility];  Service: UroGynecology           DE POST COLPORRHAPHY RECTOCELE W/WO PERINEORRHAPHY N/A 3/9/2020    Procedure: ANTERIOR AND POST COLPORRHAPHY;  Surgeon: Jerrell Zhu MD;  [facility];  Service: UroGynecology           DE SLING OPERATION STRESS INCONTINENCE N/A 3/9/2020    Procedure: SINGLE INCISION SLING;  Surgeon: Jerrell Zhu MD;  [facility];  Service: UroGynecology           VAGINAL DELIVERY      \"47 yrs ago did have sedation\"    WISDOM TOOTH EXTRACTION     [3]   Family History  Problem Relation Name Age of Onset    Osteoporosis Mother      Stroke Father      Diabetes Father      Diabetes Sister     [4]   Social History  Tobacco Use    Smoking status: Never    Smokeless tobacco: Never   Vaping Use    Vaping status: Never Used   Substance Use Topics    Alcohol use: Never    Drug use: Never   [5]   Current Outpatient Medications:     acetaminophen (TYLENOL) 500 mg tablet, Take 2 tablets (1,000 mg total) by mouth every 6 (six) hours as needed for mild pain, Disp: 90 tablet, Rfl: 0    Bromelains (BROMELAIN PO), Take by mouth in the morning, Disp: , Rfl:     cholecalciferol (VITAMIN D3) 1,000 units tablet, Take 1,000 Units by mouth in the morning., Disp: , Rfl:     methocarbamol (ROBAXIN) 500 mg tablet, Take 1 tablet (500 mg total) by mouth " "3 (three) times a day as needed for muscle spasms, Disp: 60 tablet, Rfl: 0    Omega 3 1200 MG CAPS, Take by mouth in the morning, Disp: , Rfl:     enoxaparin (LOVENOX) 40 mg/0.4 mL, Inject 0.4 mL (40 mg total) under the skin daily for 28 days To start postoperatively, Disp: 11.2 mL, Rfl: 0  [6]   Allergies  Allergen Reactions    Other Nausea Only and Headache     Chemicals and perfumes    Wheat Bran - Food Allergy Diarrhea     \"just wheat\"     "

## (undated) DEVICE — HOOD: Brand: T7PLUS

## (undated) DEVICE — GLOVE INDICATOR PI UNDERGLOVE SZ 7.5 BLUE

## (undated) DEVICE — PADDING CAST 4 IN  COTTON STRL

## (undated) DEVICE — ACE WRAP 3 IN STERILE

## (undated) DEVICE — GLOVE INDICATOR PI UNDERGLOVE SZ 8.5 BLUE

## (undated) DEVICE — ASTOUND STANDARD SURGICAL GOWN, XL: Brand: CONVERTORS

## (undated) DEVICE — GLOVE INDICATOR PI UNDERGLOVE SZ 7 BLUE

## (undated) DEVICE — NEEDLE HYPO 22G X 1-1/2 IN

## (undated) DEVICE — PROXIMATE PLUS MD MULTI-DIRECTIONAL RELEASE SKIN STAPLERS CONTAINS 35 STAINLESS STEEL STAPLES APPROXIMATE CLOSED DIMENSIONS: 6.9MM X 3.9MM WIDE: Brand: PROXIMATE

## (undated) DEVICE — SUT VICRYL 2-0 SH 27 IN UNDYED J417H

## (undated) DEVICE — PACKING VAGINAL 2 IN

## (undated) DEVICE — GLOVE SRG BIOGEL 8

## (undated) DEVICE — VELYS BLADE SAW OSC 85 X 19 X 2MM

## (undated) DEVICE — GLOVE PI ULTRA TOUCH SZ.8.0

## (undated) DEVICE — GLOVE SRG BIOGEL 6.5

## (undated) DEVICE — SILVER-COATED ANTIMICROBIAL BARRIER DRESSING: Brand: ACTICOAT   4" X 8"

## (undated) DEVICE — SUT VICRYL 0 CT-1 36 IN J946H

## (undated) DEVICE — BLADE INTREX LRG BONE OSCILLATING

## (undated) DEVICE — CURITY PLAIN PACKING STRIP: Brand: CURITY

## (undated) DEVICE — STERILE SURGICAL LUBRICANT,  TUBE: Brand: SURGILUBE

## (undated) DEVICE — SPONGE CHERRY 1/2IN

## (undated) DEVICE — INTENT TO BE USED WITH SUTURE MATERIAL FOR TISSUE CLOSURE: Brand: RICHARD-ALLAN® NEEDLE 1/2 CIRCLE TAPER

## (undated) DEVICE — VELYS ROBOT-ASSISTED SOLUTION ARRAY DRILL PIN 125 MM X 4 MM: Brand: VELYS

## (undated) DEVICE — TUBING SUCTION 5MM X 12 FT

## (undated) DEVICE — BETHLEHEM TOTAL HIP, KIT: Brand: CARDINAL HEALTH

## (undated) DEVICE — CATH FOLEY 16FR 5ML 2 WAY UNCOATED SILICONE

## (undated) DEVICE — 3 BONE CEMENT MIXER: Brand: MIXEVAC

## (undated) DEVICE — BAG POLY CLEAR 12 X 8 X 30

## (undated) DEVICE — DRAPE EQUIPMENT RF WAND

## (undated) DEVICE — HOOD WITH PEEL AWAY FACE SHIELD: Brand: T7PLUS

## (undated) DEVICE — STOCKINETTE REGULAR

## (undated) DEVICE — DRESSING EXUDERM SATIN HYDROCOLLOID 4 X 4 IN

## (undated) DEVICE — GLOVE PI ULTRA TOUCH SZ.7.0

## (undated) DEVICE — SCD SEQUENTIAL COMPRESSION COMFORT SLEEVE MEDIUM KNEE LENGTH: Brand: KENDALL SCD

## (undated) DEVICE — CAPIT HIP MOP -POLY CEMEMTED

## (undated) DEVICE — GLOVE PI ULTRA TOUCH SZ.6.5

## (undated) DEVICE — PREMIUM DRY TRAY LF: Brand: MEDLINE INDUSTRIES, INC.

## (undated) DEVICE — GLOVE INDICATOR PI UNDERGLOVE SZ 6.5 BLUE

## (undated) DEVICE — ELECTROSURGICAL DEVICE HOLSTER;FOR USE WITH MAXIMUM PEAK VOLTAGE OF 4000 V: Brand: FORCE TRIVERSE

## (undated) DEVICE — MEDI-VAC YANKAUER SUCTION HANDLE W/BULBOUS AND CONTROL VENT: Brand: CARDINAL HEALTH

## (undated) DEVICE — ABDUCTION PILLOW FOAM POSITIONER: Brand: CARDINAL HEALTH

## (undated) DEVICE — SMOKE EVACUATION TUBING WITH 8 IN INTEGRAL WAND AND SPONGE GUARD: Brand: BUFFALO FILTER

## (undated) DEVICE — BETHLEHEM UNIV TOTAL KNEE, KIT: Brand: CARDINAL HEALTH

## (undated) DEVICE — SUT VICRYL PLUS 2-0 CTB-1 27 IN VCPB259H

## (undated) DEVICE — BAG DECANTER

## (undated) DEVICE — ALLENTOWN DR  LUCENTE S LAP PK: Brand: CARDINAL HEALTH

## (undated) DEVICE — SYRINGE 10ML LL

## (undated) DEVICE — WET SKIN PREP TRAY: Brand: MEDLINE INDUSTRIES, INC.

## (undated) DEVICE — SUT VICRYL PLUS 1 CTB-1 36 IN VCPB947H

## (undated) DEVICE — VELYS ROBOTIC-ASSISTED SOLUTION ARRAY SET - KNEE: Brand: VELYS

## (undated) DEVICE — TRAY FOLEY 16FR URIMETER SILICONE SURESTEP

## (undated) DEVICE — GLOVE SRG BIOGEL 7.5

## (undated) DEVICE — JP 3-SPRING RES W/10FR PVC DRAIN/TR: Brand: CARDINAL HEALTH

## (undated) DEVICE — BULB SYRINGE,IRRIGATION WITH PROTECTIVE CAP: Brand: DOVER

## (undated) DEVICE — CAUTERY TIP POLISHER: Brand: DEVON

## (undated) DEVICE — ACE WRAP 6 IN UNSTERILE

## (undated) DEVICE — HANDPIECE SET WITH RETRACTABLE COAXIAL FAN SPRAY TIP AND SUCTION TUBE: Brand: INTERPULSE

## (undated) DEVICE — BAG URINE DRAINAGE 2000ML ANTI RFLX LF

## (undated) DEVICE — ELECTRODE NEEDLE E-Z CLEAN 6IN -0016

## (undated) DEVICE — SUT PDS II 2-0 CT-2 27 IN Z333H

## (undated) DEVICE — VELYS ROBOT DRAPE

## (undated) DEVICE — VIAL DECANTER

## (undated) DEVICE — ABDOMINAL PAD: Brand: DERMACEA

## (undated) DEVICE — 3M™ IOBAN™ 2 ANTIMICROBIAL INCISE DRAPE 6650EZ: Brand: IOBAN™ 2

## (undated) DEVICE — NO-SCRATCH ™ SMALL WHITNEY CURETTE ™ IS A SINGLE-USE, PLASTIC CURETTE FOR QUICKLY APPLYING, MANIPULATING AND REMOVING BONE CEMENT DURING HIP AND KNEE REPLACEMENT SURGERY. THE PLASTIC IS SOFTER THAN STEEL INSTRUMENTS, REDUCING THE RISK OF DAMAGING THE PROSTHESIS WITH METAL INSTRUMENTS.  THE CURETTE’S 6MM TIP REMOVES EXCESS CEMENT FROM REPLACEMENT HIPS AND KNEES. EASY-TO-MANEUVER, THE SMALL BLUE CURETTE LETS YOU REMOVE CEMENT FROM ALL EDGES OF THE PROSTHESIS.NO-SCRATCH WHITNEY SMALL CURETTE FEATURES:SAFER THAN STEEL- MADE OF PLASTIC - STURDY YET SOFTER THAN SURGICAL STEEL.HANDIER- EACH TOOL HAS A MOLDED-IN THUMB INDENTATION INSTANTLY ORIENTING THE TOOL.- EASIER TO MANEUVER IN HARD TO SEE PLACES.- COLOR-CODED FOR EASY IDENTIFICATION.FASTER- COMES INDIVIDUALLY PACKAGED IN STERILE, PEEL OPEN POUCH, READY TO GO.- APPLIES, MANIPULATES, OR REMOVES CEMENT WITH FINGERTIP PRECISION.ECONOMICAL- THE COST OF A SINGLE REVISION DWARFS THE COST OF A SINGLE-USE CURETTE. - DISPOSABLE – THERE’S NO NEED TO WASTE TIME REMOVING HARDENED CEMENT OR RE-STERILIZING TOOLS.- LESS EXPENSIVE TO BUY AND INVENTORY - ORDER ONLY THE TOOL YOU USE.- PACKAGED 25 INDIVIDUALLY WRAPPED TOOLS TO A CARTON FOR CONVENIENT SHELF STORAGE.: Brand: WHITNEY NO-SCRATCH CURETTE (SMALL)

## (undated) DEVICE — PLUMEPEN PRO 10FT

## (undated) DEVICE — BLADE SAW RECIP 12.5 X 76MM 0.9/0.9MM THCK

## (undated) DEVICE — CATH FOLEY 18FR 5ML 2 WAY UNCOATED SILICONE

## (undated) DEVICE — UNDER BUTTOCKS DRAPE W/FLUID CONTROL POUCH: Brand: CONVERTORS

## (undated) DEVICE — VELYS SATEL DRAPE

## (undated) DEVICE — ACE WRAP 6 IN STERILE

## (undated) DEVICE — 2000CC GUARDIAN II: Brand: GUARDIAN

## (undated) DEVICE — PAD CAST 6 IN COTTON NON STERILE

## (undated) DEVICE — PENCIL ELECTROSURG E-Z CLEAN -0035H

## (undated) DEVICE — INTENDED FOR TISSUE SEPARATION, AND OTHER PROCEDURES THAT REQUIRE A SHARP SURGICAL BLADE TO PUNCTURE OR CUT.: Brand: BARD-PARKER SAFETY BLADES SIZE 11, STERILE

## (undated) DEVICE — STIRRUP STRAP ADULT DISP

## (undated) DEVICE — GLOVE INDICATOR PI UNDERGLOVE SZ 8 BLUE

## (undated) DEVICE — MEDI-VAC TUBING CONNECTOR 6-IN-1 STRAIGHT: Brand: CARDINAL HEALTH

## (undated) DEVICE — PAD CAST 4 IN COTTON NON STERILE

## (undated) DEVICE — SAW BLADE OSCILLATING BRAZOL 167

## (undated) DEVICE — IV CATH 14 G SAFETY

## (undated) DEVICE — TRAY FOLEY 16FR URIMETER SURESTEP

## (undated) DEVICE — EXIDINE 4 PCT

## (undated) DEVICE — CAPIT KNEE ATTUNE FB W/DOM AOX

## (undated) DEVICE — DRESSING MEPILEX AG BORDER POST-OP 4 X 12 IN

## (undated) DEVICE — NEEDLE SPINAL 22G X 3.5IN  QUINCKE